# Patient Record
Sex: MALE | Race: WHITE | Employment: OTHER | URBAN - NONMETROPOLITAN AREA
[De-identification: names, ages, dates, MRNs, and addresses within clinical notes are randomized per-mention and may not be internally consistent; named-entity substitution may affect disease eponyms.]

---

## 2018-07-09 ENCOUNTER — HOSPITAL ENCOUNTER (EMERGENCY)
Age: 63
Discharge: HOME | End: 2018-07-09
Attending: EMERGENCY MEDICINE
Payer: MEDICAID

## 2018-07-09 VITALS
TEMPERATURE: 97.6 F | DIASTOLIC BLOOD PRESSURE: 84 MMHG | HEIGHT: 70 IN | BODY MASS INDEX: 23.91 KG/M2 | SYSTOLIC BLOOD PRESSURE: 111 MMHG | WEIGHT: 167 LBS | OXYGEN SATURATION: 97 % | RESPIRATION RATE: 16 BRPM | HEART RATE: 109 BPM

## 2018-07-09 DIAGNOSIS — M54.2 CHRONIC NECK PAIN: ICD-10-CM

## 2018-07-09 DIAGNOSIS — G89.29 CHRONIC NECK PAIN: ICD-10-CM

## 2018-07-09 DIAGNOSIS — F10.920 ACUTE ALCOHOLIC INTOXICATION WITHOUT COMPLICATION (HCC): Primary | ICD-10-CM

## 2018-07-09 LAB
ALBUMIN SERPL-MCNC: 4.2 G/DL (ref 3.5–5.2)
ALP BLD-CCNC: 77 U/L (ref 40–130)
ALT SERPL-CCNC: 12 U/L (ref 5–41)
AMPHETAMINE SCREEN, URINE: NEGATIVE
ANION GAP SERPL CALCULATED.3IONS-SCNC: 19 MMOL/L (ref 7–19)
AST SERPL-CCNC: 30 U/L (ref 5–40)
BANDED NEUTROPHILS RELATIVE PERCENT: 1 % (ref 0–5)
BARBITURATE SCREEN URINE: NEGATIVE
BASOPHILS ABSOLUTE: 0.3 K/UL (ref 0–0.2)
BASOPHILS MANUAL: 4 %
BASOPHILS RELATIVE PERCENT: 4 % (ref 0–1)
BENZODIAZEPINE SCREEN, URINE: POSITIVE
BILIRUB SERPL-MCNC: <0.2 MG/DL (ref 0.2–1.2)
BUN BLDV-MCNC: 18 MG/DL (ref 8–23)
CALCIUM SERPL-MCNC: 8.5 MG/DL (ref 8.8–10.2)
CANNABINOID SCREEN URINE: NEGATIVE
CHLORIDE BLD-SCNC: 106 MMOL/L (ref 98–111)
CO2: 18 MMOL/L (ref 22–29)
COCAINE METABOLITE SCREEN URINE: NEGATIVE
CREAT SERPL-MCNC: 1.3 MG/DL (ref 0.5–1.2)
EOSINOPHILS ABSOLUTE: 0.55 K/UL (ref 0–0.6)
EOSINOPHILS RELATIVE PERCENT: 7 % (ref 0–5)
ETHANOL: 315 MG/DL (ref 0–0.08)
GFR NON-AFRICAN AMERICAN: 56
GLUCOSE BLD-MCNC: 106 MG/DL (ref 74–109)
HCT VFR BLD CALC: 35.2 % (ref 42–52)
HEMOGLOBIN: 11.8 G/DL (ref 14–18)
LYMPHOCYTES ABSOLUTE: 2.3 K/UL (ref 1.1–4.5)
LYMPHOCYTES RELATIVE PERCENT: 30 % (ref 20–40)
Lab: ABNORMAL
MACROCYTES: ABNORMAL
MCH RBC QN AUTO: 37.3 PG (ref 27–31)
MCHC RBC AUTO-ENTMCNC: 33.5 G/DL (ref 33–37)
MCV RBC AUTO: 111.4 FL (ref 80–94)
MONOCYTES ABSOLUTE: 0.5 K/UL (ref 0–0.9)
MONOCYTES RELATIVE PERCENT: 7 % (ref 0–10)
NEUTROPHILS ABSOLUTE: 4.1 K/UL (ref 1.5–7.5)
NEUTROPHILS MANUAL: 51 %
NEUTROPHILS RELATIVE PERCENT: 51 % (ref 50–65)
OPIATE SCREEN URINE: POSITIVE
PDW BLD-RTO: 13.5 % (ref 11.5–14.5)
PLATELET # BLD: 222 K/UL (ref 130–400)
PLATELET SLIDE REVIEW: ADEQUATE
PMV BLD AUTO: 9.7 FL (ref 9.4–12.4)
POTASSIUM SERPL-SCNC: 4.2 MMOL/L (ref 3.5–5)
RBC # BLD: 3.16 M/UL (ref 4.7–6.1)
SODIUM BLD-SCNC: 143 MMOL/L (ref 136–145)
TOTAL PROTEIN: 6.8 G/DL (ref 6.6–8.7)
WBC # BLD: 7.8 K/UL (ref 4.8–10.8)

## 2018-07-09 PROCEDURE — 80307 DRUG TEST PRSMV CHEM ANLYZR: CPT

## 2018-07-09 PROCEDURE — 99284 EMERGENCY DEPT VISIT MOD MDM: CPT

## 2018-07-09 PROCEDURE — 99283 EMERGENCY DEPT VISIT LOW MDM: CPT | Performed by: EMERGENCY MEDICINE

## 2018-07-09 PROCEDURE — G0480 DRUG TEST DEF 1-7 CLASSES: HCPCS

## 2018-07-09 PROCEDURE — 6370000000 HC RX 637 (ALT 250 FOR IP): Performed by: EMERGENCY MEDICINE

## 2018-07-09 PROCEDURE — 36415 COLL VENOUS BLD VENIPUNCTURE: CPT

## 2018-07-09 PROCEDURE — 2580000003 HC RX 258: Performed by: EMERGENCY MEDICINE

## 2018-07-09 PROCEDURE — 85025 COMPLETE CBC W/AUTO DIFF WBC: CPT

## 2018-07-09 PROCEDURE — 80053 COMPREHEN METABOLIC PANEL: CPT

## 2018-07-09 RX ORDER — LORATADINE 10 MG/1
10 CAPSULE, LIQUID FILLED ORAL DAILY PRN
Status: ON HOLD | COMMUNITY
End: 2020-02-03

## 2018-07-09 RX ORDER — HYDROCODONE BITARTRATE AND ACETAMINOPHEN 5; 325 MG/1; MG/1
1 TABLET ORAL ONCE
Status: COMPLETED | OUTPATIENT
Start: 2018-07-09 | End: 2018-07-09

## 2018-07-09 RX ORDER — DOCUSATE SODIUM 100 MG/1
100 CAPSULE, LIQUID FILLED ORAL PRN
COMMUNITY
End: 2020-02-02 | Stop reason: ALTCHOICE

## 2018-07-09 RX ORDER — 0.9 % SODIUM CHLORIDE 0.9 %
1000 INTRAVENOUS SOLUTION INTRAVENOUS ONCE
Status: COMPLETED | OUTPATIENT
Start: 2018-07-09 | End: 2018-07-09

## 2018-07-09 RX ORDER — 0.9 % SODIUM CHLORIDE 0.9 %
1000 INTRAVENOUS SOLUTION INTRAVENOUS ONCE
Status: DISCONTINUED | OUTPATIENT
Start: 2018-07-09 | End: 2018-07-09 | Stop reason: HOSPADM

## 2018-07-09 RX ORDER — CHLORDIAZEPOXIDE HYDROCHLORIDE 10 MG/1
10 CAPSULE, GELATIN COATED ORAL 2 TIMES DAILY
COMMUNITY
End: 2020-02-02 | Stop reason: ALTCHOICE

## 2018-07-09 RX ORDER — SIMVASTATIN 40 MG
40 TABLET ORAL NIGHTLY
Status: ON HOLD | COMMUNITY
End: 2018-10-11 | Stop reason: HOSPADM

## 2018-07-09 RX ADMIN — HYDROCODONE BITARTRATE AND ACETAMINOPHEN 1 TABLET: 5; 325 TABLET ORAL at 11:48

## 2018-07-09 RX ADMIN — SODIUM CHLORIDE 1000 ML: 9 INJECTION, SOLUTION INTRAVENOUS at 10:58

## 2018-07-09 ASSESSMENT — PAIN DESCRIPTION - PAIN TYPE
TYPE: CHRONIC PAIN
TYPE: CHRONIC PAIN

## 2018-07-09 ASSESSMENT — PAIN SCALES - GENERAL
PAINLEVEL_OUTOF10: 10
PAINLEVEL_OUTOF10: 9

## 2018-07-09 ASSESSMENT — ENCOUNTER SYMPTOMS
DIARRHEA: 0
BACK PAIN: 0
SHORTNESS OF BREATH: 0
RHINORRHEA: 0
VOMITING: 0
SORE THROAT: 0
COUGH: 0
ABDOMINAL PAIN: 0
NAUSEA: 0

## 2018-07-09 NOTE — ED PROVIDER NOTES
disease    Spinal stenosis     cervical    Ulcer ([facility] [address]          SURGICAL HISTORY       Past Surgical History:   Procedure Laterality Date    HERNIA REPAIR           CURRENT MEDICATIONS       Discharge Medication List as of 7/9/2018  1:04 PM      CONTINUE these medications which have NOT CHANGED    Details   chlordiazePOXIDE (LIBRIUM) 10 MG capsule Take 10 mg by mouth 3 times daily as needed for Anxiety. Fanny Crawford Historical Med      docusate sodium (COLACE) 100 MG capsule Take 100 mg by mouth 2 times dailyHistorical Med      EPINEPHRINE HCL IN Inhale into the lungsHistorical Med      loratadine (CLARITIN) 10 MG capsule Take 10 mg by mouth dailyHistorical Med      simvastatin (ZOCOR) 40 MG tablet Take 40 mg by mouth nightlyHistorical Med      venlafaxine (EFFEXOR) 75 MG tablet Take 75 mg by mouth nightly      clopidogrel (PLAVIX) 75 MG tablet Take 1 tablet by mouth daily, Disp-30 tablet, R-1      aspirin 81 MG EC tablet Take 1 tablet by mouth daily, Disp-30 tablet, R-0      albuterol sulfate  (90 BASE) MCG/ACT inhaler Inhale 2 puffs into the lungs every 6 hours as needed for Wheezing      lisinopril (PRINIVIL;ZESTRIL) 10 MG tablet Take 10 mg by mouth daily      ferrous sulfate 325 (65 FE) MG tablet Take 325 mg by mouth 2 times daily      HYDROcodone-acetaminophen (NORCO)  MG per tablet Take 1 tablet by mouth 2 times daily as needed for Pain      ranitidine (ZANTAC) 150 MG tablet Take 150 mg by mouth 2 times daily      atorvastatin (LIPITOR) 40 MG tablet Take 1 tablet by mouth daily, Disp-30 tablet, R-0      tamsulosin (FLOMAX) 0.4 MG capsule Take 1 capsule by mouth daily, Disp-30 capsule, R-0      Omega-3 Fatty Acids (FISH OIL) 1000 MG CPDR Take 1 capsule by mouth 2 times daily, Disp-60 capsule, R-0      sucralfate (CARAFATE) 1 GM tablet Take 1 tablet by mouth 4 times daily, Disp-120 tablet, R-0      Dexlansoprazole 30 MG CPDR Take 30 mg by mouth daily, Disp-30 capsule, R-1      DULoxetine (CYMBALTA) 30 MG capsule Take 1 capsule by mouth nightly, Disp-30 capsule, R-1      gemfibrozil (LOPID) 600 MG tablet Take 600 mg by mouth 2 times daily (before meals)             ALLERGIES     Niacin and related and Flu virus vaccine    FAMILY HISTORY     History reviewed. No pertinent family history. SOCIAL HISTORY       Social History     Social History    Marital status:      Spouse name: N/A    Number of children: N/A    Years of education: N/A     Social History Main Topics    Smoking status: Former Smoker     Packs/day: 1.00     Years: 35.00     Types: Cigarettes     Quit date: 2/11/2016    Smokeless tobacco: Never Used    Alcohol use 0.0 oz/week      Comment: vodka every other day. up to 1/2 a pint.  Drug use: Yes     Types: Marijuana      Comment: occassionally.  Sexual activity: Not Asked     Other Topics Concern    None     Social History Narrative    None       SCREENINGS             PHYSICAL EXAM    (up to 7 for level 4, 8 or more for level 5)     ED Triage Vitals   BP Temp Temp src Pulse Resp SpO2 Height Weight   07/09/18 0844 07/09/18 0844 -- 07/09/18 0844 07/09/18 0844 07/09/18 0844 07/09/18 0838 07/09/18 0838   (!) 138/105 97.6 °F (36.4 °C)  126 20 99 % 5' 10\" (1.778 m) 167 lb (75.8 kg)       Physical Exam   Constitutional: He is oriented to person, place, and time. He appears well-developed and well-nourished. Smells of alcohol, unkempt appearance   HENT:   Head: Normocephalic and atraumatic. Eyes: Conjunctivae and EOM are normal.   Neck: Normal range of motion. Neck supple. No tracheal deviation present. Somewhat limited range of motion when turning head bilaterally but he has no focal tenderness on exam   Cardiovascular: Normal rate, regular rhythm and normal heart sounds. No murmur heard. Pulmonary/Chest: Breath sounds normal. He has no wheezes. He has no rales. Abdominal: Soft. There is no tenderness. Musculoskeletal: Normal range of motion. He exhibits no edema.

## 2018-07-09 NOTE — ED NOTES
Pt states he has chronic nek pain from a fracture and that he drinks to ease the pain.      Herb Verma RN  07/09/18 0292

## 2018-10-10 ENCOUNTER — APPOINTMENT (OUTPATIENT)
Dept: GENERAL RADIOLOGY | Age: 63
DRG: 469 | End: 2018-10-10
Payer: MEDICAID

## 2018-10-10 ENCOUNTER — APPOINTMENT (OUTPATIENT)
Dept: CT IMAGING | Age: 63
DRG: 469 | End: 2018-10-10
Payer: MEDICAID

## 2018-10-10 ENCOUNTER — HOSPITAL ENCOUNTER (INPATIENT)
Age: 63
LOS: 1 days | Discharge: HOME | DRG: 469 | End: 2018-10-11
Attending: EMERGENCY MEDICINE | Admitting: HOSPITALIST
Payer: MEDICAID

## 2018-10-10 DIAGNOSIS — G89.29 OTHER CHRONIC PAIN: ICD-10-CM

## 2018-10-10 DIAGNOSIS — R55 SYNCOPE AND COLLAPSE: Primary | ICD-10-CM

## 2018-10-10 DIAGNOSIS — E87.20 METABOLIC ACIDOSIS: ICD-10-CM

## 2018-10-10 DIAGNOSIS — F10.929 ACUTE ALCOHOLIC INTOXICATION WITH COMPLICATION (HCC): ICD-10-CM

## 2018-10-10 DIAGNOSIS — Z86.73 HX OF COMPLETED STROKE: ICD-10-CM

## 2018-10-10 DIAGNOSIS — N28.9 RENAL INSUFFICIENCY: ICD-10-CM

## 2018-10-10 PROBLEM — F19.10 POLYSUBSTANCE ABUSE (HCC): Status: ACTIVE | Noted: 2018-10-10

## 2018-10-10 PROBLEM — F10.930 ALCOHOL WITHDRAWAL SYNDROME WITHOUT COMPLICATION (HCC): Status: ACTIVE | Noted: 2018-10-10

## 2018-10-10 PROBLEM — F10.10 ALCOHOL ABUSE: Status: ACTIVE | Noted: 2018-10-10

## 2018-10-10 LAB
ACETAMINOPHEN LEVEL: <15 UG/ML
ALBUMIN SERPL-MCNC: 4 G/DL (ref 3.5–5.2)
ALP BLD-CCNC: 54 U/L (ref 40–130)
ALT SERPL-CCNC: 8 U/L (ref 5–41)
AMPHETAMINE SCREEN, URINE: NEGATIVE
ANION GAP SERPL CALCULATED.3IONS-SCNC: 21 MMOL/L (ref 7–19)
APTT: 30.1 SEC (ref 26–36.2)
AST SERPL-CCNC: 14 U/L (ref 5–40)
BARBITURATE SCREEN URINE: NEGATIVE
BASOPHILS ABSOLUTE: 0.1 K/UL (ref 0–0.2)
BASOPHILS RELATIVE PERCENT: 1.5 % (ref 0–1)
BENZODIAZEPINE SCREEN, URINE: POSITIVE
BILIRUB SERPL-MCNC: <0.2 MG/DL (ref 0.2–1.2)
BILIRUBIN URINE: NEGATIVE
BLOOD, URINE: NEGATIVE
BUN BLDV-MCNC: 29 MG/DL (ref 8–23)
CALCIUM SERPL-MCNC: 8.3 MG/DL (ref 8.8–10.2)
CANNABINOID SCREEN URINE: POSITIVE
CHLORIDE BLD-SCNC: 98 MMOL/L (ref 98–111)
CLARITY: CLEAR
CO2: 16 MMOL/L (ref 22–29)
COCAINE METABOLITE SCREEN URINE: NEGATIVE
COLOR: YELLOW
CREAT SERPL-MCNC: 1.7 MG/DL (ref 0.5–1.2)
EOSINOPHILS ABSOLUTE: 0.2 K/UL (ref 0–0.6)
EOSINOPHILS RELATIVE PERCENT: 3.7 % (ref 0–5)
ETHANOL: 374 MG/DL (ref 0–0.08)
GFR NON-AFRICAN AMERICAN: 41
GLUCOSE BLD-MCNC: 111 MG/DL (ref 74–109)
GLUCOSE URINE: NEGATIVE MG/DL
HCT VFR BLD CALC: 30 % (ref 42–52)
HEMOGLOBIN: 10 G/DL (ref 14–18)
INR BLD: 1.01 (ref 0.88–1.18)
KETONES, URINE: NEGATIVE MG/DL
LEUKOCYTE ESTERASE, URINE: NEGATIVE
LIPASE: 47 U/L (ref 13–60)
LV EF: 58 %
LVEF MODALITY: NORMAL
LYMPHOCYTES ABSOLUTE: 2.1 K/UL (ref 1.1–4.5)
LYMPHOCYTES RELATIVE PERCENT: 37.5 % (ref 20–40)
Lab: ABNORMAL
MAGNESIUM: 2 MG/DL (ref 1.6–2.4)
MCH RBC QN AUTO: 37.2 PG (ref 27–31)
MCHC RBC AUTO-ENTMCNC: 33.3 G/DL (ref 33–37)
MCV RBC AUTO: 111.5 FL (ref 80–94)
MONOCYTES ABSOLUTE: 0.3 K/UL (ref 0–0.9)
MONOCYTES RELATIVE PERCENT: 4.9 % (ref 0–10)
NEUTROPHILS ABSOLUTE: 2.9 K/UL (ref 1.5–7.5)
NEUTROPHILS RELATIVE PERCENT: 52.2 % (ref 50–65)
NITRITE, URINE: NEGATIVE
OPIATE SCREEN URINE: NEGATIVE
PDW BLD-RTO: 15.7 % (ref 11.5–14.5)
PH UA: 5.5
PLATELET # BLD: 183 K/UL (ref 130–400)
PMV BLD AUTO: 9.2 FL (ref 9.4–12.4)
POTASSIUM SERPL-SCNC: 4 MMOL/L (ref 3.5–5)
PROTEIN UA: NEGATIVE MG/DL
PROTHROMBIN TIME: 13.2 SEC (ref 12–14.6)
RBC # BLD: 2.69 M/UL (ref 4.7–6.1)
SALICYLATE, SERUM: <3 MG/DL (ref 3–10)
SODIUM BLD-SCNC: 135 MMOL/L (ref 136–145)
SPECIFIC GRAVITY UA: 1.01
TOTAL CK: 114 U/L (ref 39–308)
TOTAL PROTEIN: 6.6 G/DL (ref 6.6–8.7)
TROPONIN: <0.01 NG/ML (ref 0–0.03)
URINE REFLEX TO CULTURE: NORMAL
UROBILINOGEN, URINE: 0.2 E.U./DL
WBC # BLD: 5.5 K/UL (ref 4.8–10.8)

## 2018-10-10 PROCEDURE — 85610 PROTHROMBIN TIME: CPT

## 2018-10-10 PROCEDURE — 6370000000 HC RX 637 (ALT 250 FOR IP): Performed by: EMERGENCY MEDICINE

## 2018-10-10 PROCEDURE — 2580000003 HC RX 258: Performed by: EMERGENCY MEDICINE

## 2018-10-10 PROCEDURE — 99285 EMERGENCY DEPT VISIT HI MDM: CPT | Performed by: EMERGENCY MEDICINE

## 2018-10-10 PROCEDURE — 83690 ASSAY OF LIPASE: CPT

## 2018-10-10 PROCEDURE — 70450 CT HEAD/BRAIN W/O DYE: CPT

## 2018-10-10 PROCEDURE — 83735 ASSAY OF MAGNESIUM: CPT

## 2018-10-10 PROCEDURE — G0480 DRUG TEST DEF 1-7 CLASSES: HCPCS

## 2018-10-10 PROCEDURE — 6360000002 HC RX W HCPCS: Performed by: INTERNAL MEDICINE

## 2018-10-10 PROCEDURE — 1210000000 HC MED SURG R&B

## 2018-10-10 PROCEDURE — 71045 X-RAY EXAM CHEST 1 VIEW: CPT

## 2018-10-10 PROCEDURE — 93005 ELECTROCARDIOGRAM TRACING: CPT

## 2018-10-10 PROCEDURE — 36415 COLL VENOUS BLD VENIPUNCTURE: CPT

## 2018-10-10 PROCEDURE — 2580000003 HC RX 258: Performed by: INTERNAL MEDICINE

## 2018-10-10 PROCEDURE — 85730 THROMBOPLASTIN TIME PARTIAL: CPT

## 2018-10-10 PROCEDURE — 6370000000 HC RX 637 (ALT 250 FOR IP): Performed by: INTERNAL MEDICINE

## 2018-10-10 PROCEDURE — 99285 EMERGENCY DEPT VISIT HI MDM: CPT

## 2018-10-10 PROCEDURE — 80053 COMPREHEN METABOLIC PANEL: CPT

## 2018-10-10 PROCEDURE — 85025 COMPLETE CBC W/AUTO DIFF WBC: CPT

## 2018-10-10 PROCEDURE — 6370000000 HC RX 637 (ALT 250 FOR IP): Performed by: HOSPITALIST

## 2018-10-10 PROCEDURE — 93306 TTE W/DOPPLER COMPLETE: CPT

## 2018-10-10 PROCEDURE — 99225 PR SBSQ OBSERVATION CARE/DAY 25 MINUTES: CPT | Performed by: INTERNAL MEDICINE

## 2018-10-10 PROCEDURE — 80307 DRUG TEST PRSMV CHEM ANLYZR: CPT

## 2018-10-10 PROCEDURE — 96375 TX/PRO/DX INJ NEW DRUG ADDON: CPT

## 2018-10-10 PROCEDURE — 84484 ASSAY OF TROPONIN QUANT: CPT

## 2018-10-10 PROCEDURE — 82550 ASSAY OF CK (CPK): CPT

## 2018-10-10 PROCEDURE — 81003 URINALYSIS AUTO W/O SCOPE: CPT

## 2018-10-10 PROCEDURE — 6360000002 HC RX W HCPCS: Performed by: EMERGENCY MEDICINE

## 2018-10-10 PROCEDURE — 96374 THER/PROPH/DIAG INJ IV PUSH: CPT

## 2018-10-10 PROCEDURE — 72125 CT NECK SPINE W/O DYE: CPT

## 2018-10-10 RX ORDER — HYDROCODONE BITARTRATE AND ACETAMINOPHEN 10; 325 MG/1; MG/1
1 TABLET ORAL ONCE
Status: COMPLETED | OUTPATIENT
Start: 2018-10-10 | End: 2018-10-10

## 2018-10-10 RX ORDER — LORAZEPAM 2 MG/ML
3 INJECTION INTRAMUSCULAR
Status: DISCONTINUED | OUTPATIENT
Start: 2018-10-10 | End: 2018-10-11 | Stop reason: HOSPADM

## 2018-10-10 RX ORDER — LORAZEPAM 1 MG/1
2 TABLET ORAL
Status: DISCONTINUED | OUTPATIENT
Start: 2018-10-10 | End: 2018-10-11 | Stop reason: HOSPADM

## 2018-10-10 RX ORDER — LORAZEPAM 2 MG/ML
2 INJECTION INTRAMUSCULAR
Status: DISCONTINUED | OUTPATIENT
Start: 2018-10-10 | End: 2018-10-11 | Stop reason: HOSPADM

## 2018-10-10 RX ORDER — LORAZEPAM 2 MG/ML
4 INJECTION INTRAMUSCULAR
Status: DISCONTINUED | OUTPATIENT
Start: 2018-10-10 | End: 2018-10-11 | Stop reason: HOSPADM

## 2018-10-10 RX ORDER — LORAZEPAM 2 MG/ML
1 INJECTION INTRAMUSCULAR ONCE
Status: COMPLETED | OUTPATIENT
Start: 2018-10-10 | End: 2018-10-10

## 2018-10-10 RX ORDER — LORAZEPAM 2 MG/ML
1 INJECTION INTRAMUSCULAR
Status: DISCONTINUED | OUTPATIENT
Start: 2018-10-10 | End: 2018-10-11 | Stop reason: HOSPADM

## 2018-10-10 RX ORDER — LORAZEPAM 1 MG/1
4 TABLET ORAL
Status: DISCONTINUED | OUTPATIENT
Start: 2018-10-10 | End: 2018-10-11 | Stop reason: HOSPADM

## 2018-10-10 RX ORDER — THIAMINE MONONITRATE (VIT B1) 100 MG
100 TABLET ORAL DAILY
Status: DISCONTINUED | OUTPATIENT
Start: 2018-10-10 | End: 2018-10-11 | Stop reason: HOSPADM

## 2018-10-10 RX ORDER — CALCIUM CARBONATE 200(500)MG
500 TABLET,CHEWABLE ORAL 3 TIMES DAILY PRN
Status: DISCONTINUED | OUTPATIENT
Start: 2018-10-10 | End: 2018-10-11 | Stop reason: HOSPADM

## 2018-10-10 RX ORDER — LORAZEPAM 1 MG/1
3 TABLET ORAL
Status: DISCONTINUED | OUTPATIENT
Start: 2018-10-10 | End: 2018-10-11 | Stop reason: HOSPADM

## 2018-10-10 RX ORDER — HYDROCODONE BITARTRATE AND ACETAMINOPHEN 10; 325 MG/1; MG/1
1 TABLET ORAL EVERY 6 HOURS PRN
Status: DISCONTINUED | OUTPATIENT
Start: 2018-10-10 | End: 2018-10-11 | Stop reason: HOSPADM

## 2018-10-10 RX ORDER — SODIUM CHLORIDE 9 MG/ML
INJECTION, SOLUTION INTRAVENOUS CONTINUOUS
Status: DISCONTINUED | OUTPATIENT
Start: 2018-10-10 | End: 2018-10-11 | Stop reason: HOSPADM

## 2018-10-10 RX ORDER — THIAMINE HYDROCHLORIDE 100 MG/ML
100 INJECTION, SOLUTION INTRAMUSCULAR; INTRAVENOUS ONCE
Status: COMPLETED | OUTPATIENT
Start: 2018-10-10 | End: 2018-10-10

## 2018-10-10 RX ORDER — CHLORDIAZEPOXIDE HYDROCHLORIDE 25 MG/1
25 CAPSULE, GELATIN COATED ORAL ONCE
Status: COMPLETED | OUTPATIENT
Start: 2018-10-10 | End: 2018-10-10

## 2018-10-10 RX ORDER — LORAZEPAM 1 MG/1
1 TABLET ORAL
Status: DISCONTINUED | OUTPATIENT
Start: 2018-10-10 | End: 2018-10-11 | Stop reason: HOSPADM

## 2018-10-10 RX ORDER — ALBUTEROL SULFATE 90 UG/1
2 AEROSOL, METERED RESPIRATORY (INHALATION) EVERY 6 HOURS PRN
Status: DISCONTINUED | OUTPATIENT
Start: 2018-10-10 | End: 2018-10-11 | Stop reason: HOSPADM

## 2018-10-10 RX ORDER — ONDANSETRON 2 MG/ML
4 INJECTION INTRAMUSCULAR; INTRAVENOUS EVERY 6 HOURS PRN
Status: DISCONTINUED | OUTPATIENT
Start: 2018-10-10 | End: 2018-10-11 | Stop reason: HOSPADM

## 2018-10-10 RX ORDER — FOLIC ACID 1 MG/1
1 TABLET ORAL DAILY
Status: DISCONTINUED | OUTPATIENT
Start: 2018-10-10 | End: 2018-10-11 | Stop reason: HOSPADM

## 2018-10-10 RX ORDER — SODIUM CHLORIDE 0.9 % (FLUSH) 0.9 %
10 SYRINGE (ML) INJECTION PRN
Status: DISCONTINUED | OUTPATIENT
Start: 2018-10-10 | End: 2018-10-11 | Stop reason: HOSPADM

## 2018-10-10 RX ORDER — SODIUM CHLORIDE 0.9 % (FLUSH) 0.9 %
10 SYRINGE (ML) INJECTION EVERY 12 HOURS SCHEDULED
Status: DISCONTINUED | OUTPATIENT
Start: 2018-10-10 | End: 2018-10-11 | Stop reason: HOSPADM

## 2018-10-10 RX ORDER — 0.9 % SODIUM CHLORIDE 0.9 %
1000 INTRAVENOUS SOLUTION INTRAVENOUS ONCE
Status: COMPLETED | OUTPATIENT
Start: 2018-10-10 | End: 2018-10-10

## 2018-10-10 RX ADMIN — ENOXAPARIN SODIUM 40 MG: 100 INJECTION SUBCUTANEOUS at 09:00

## 2018-10-10 RX ADMIN — CHLORDIAZEPOXIDE HYDROCHLORIDE 25 MG: 25 CAPSULE ORAL at 02:59

## 2018-10-10 RX ADMIN — ALBUTEROL SULFATE 2 PUFF: 90 AEROSOL, METERED RESPIRATORY (INHALATION) at 14:33

## 2018-10-10 RX ADMIN — SODIUM CHLORIDE: 9 INJECTION, SOLUTION INTRAVENOUS at 08:59

## 2018-10-10 RX ADMIN — LORAZEPAM 1 MG: 2 INJECTION INTRAMUSCULAR; INTRAVENOUS at 19:21

## 2018-10-10 RX ADMIN — LORAZEPAM 3 MG: 1 TABLET ORAL at 08:59

## 2018-10-10 RX ADMIN — HYDROCODONE BITARTRATE AND ACETAMINOPHEN 1 TABLET: 10; 325 TABLET ORAL at 08:59

## 2018-10-10 RX ADMIN — Medication 100 MG: at 09:00

## 2018-10-10 RX ADMIN — HYDROCODONE BITARTRATE AND ACETAMINOPHEN 1 TABLET: 10; 325 TABLET ORAL at 19:21

## 2018-10-10 RX ADMIN — ALBUTEROL SULFATE 2 PUFF: 90 AEROSOL, METERED RESPIRATORY (INHALATION) at 20:18

## 2018-10-10 RX ADMIN — CALCIUM CARBONATE 500 MG: 500 TABLET, CHEWABLE ORAL at 21:47

## 2018-10-10 RX ADMIN — THIAMINE HYDROCHLORIDE 100 MG: 100 INJECTION, SOLUTION INTRAMUSCULAR; INTRAVENOUS at 01:35

## 2018-10-10 RX ADMIN — FOLIC ACID 1 MG: 1 TABLET ORAL at 09:00

## 2018-10-10 RX ADMIN — HYDROCODONE BITARTRATE AND ACETAMINOPHEN 1 TABLET: 10; 325 TABLET ORAL at 02:59

## 2018-10-10 RX ADMIN — LORAZEPAM 1 MG: 2 INJECTION INTRAMUSCULAR; INTRAVENOUS at 03:43

## 2018-10-10 RX ADMIN — Medication 10 ML: at 08:59

## 2018-10-10 RX ADMIN — SODIUM CHLORIDE 1000 ML: 9 INJECTION, SOLUTION INTRAVENOUS at 01:35

## 2018-10-10 ASSESSMENT — PAIN SCALES - GENERAL
PAINLEVEL_OUTOF10: 8
PAINLEVEL_OUTOF10: 3
PAINLEVEL_OUTOF10: 8
PAINLEVEL_OUTOF10: 10
PAINLEVEL_OUTOF10: 4

## 2018-10-10 ASSESSMENT — ENCOUNTER SYMPTOMS
BLURRED VISION: 0
DOUBLE VISION: 0
VOMITING: 0
SPUTUM PRODUCTION: 0
COUGH: 0
SHORTNESS OF BREATH: 0
ABDOMINAL PAIN: 0
NAUSEA: 0
HEARTBURN: 0

## 2018-10-10 NOTE — ED NOTES
Patient placed in a gown Patient placed on cardiac monitor, continuous pulse oximeter, and NIBP monitor.  Monitor alarms on.       Nader Ross RN  10/10/18 0009

## 2018-10-11 VITALS
HEART RATE: 80 BPM | SYSTOLIC BLOOD PRESSURE: 130 MMHG | OXYGEN SATURATION: 98 % | RESPIRATION RATE: 18 BRPM | DIASTOLIC BLOOD PRESSURE: 78 MMHG | TEMPERATURE: 97.9 F

## 2018-10-11 LAB
ANION GAP SERPL CALCULATED.3IONS-SCNC: 14 MMOL/L (ref 7–19)
BASOPHILS ABSOLUTE: 0.1 K/UL (ref 0–0.2)
BASOPHILS RELATIVE PERCENT: 1 % (ref 0–1)
BUN BLDV-MCNC: 21 MG/DL (ref 8–23)
CALCIUM SERPL-MCNC: 8.6 MG/DL (ref 8.8–10.2)
CHLORIDE BLD-SCNC: 107 MMOL/L (ref 98–111)
CO2: 20 MMOL/L (ref 22–29)
CREAT SERPL-MCNC: 1.1 MG/DL (ref 0.5–1.2)
EOSINOPHILS ABSOLUTE: 0.3 K/UL (ref 0–0.6)
EOSINOPHILS RELATIVE PERCENT: 4.4 % (ref 0–5)
GFR NON-AFRICAN AMERICAN: >60
GLUCOSE BLD-MCNC: 86 MG/DL (ref 74–109)
HCT VFR BLD CALC: 28.9 % (ref 42–52)
HEMOGLOBIN: 9.4 G/DL (ref 14–18)
LYMPHOCYTES ABSOLUTE: 1.4 K/UL (ref 1.1–4.5)
LYMPHOCYTES RELATIVE PERCENT: 24.6 % (ref 20–40)
MCH RBC QN AUTO: 36.6 PG (ref 27–31)
MCHC RBC AUTO-ENTMCNC: 32.5 G/DL (ref 33–37)
MCV RBC AUTO: 112.5 FL (ref 80–94)
MONOCYTES ABSOLUTE: 0.2 K/UL (ref 0–0.9)
MONOCYTES RELATIVE PERCENT: 4.1 % (ref 0–10)
NEUTROPHILS ABSOLUTE: 3.8 K/UL (ref 1.5–7.5)
NEUTROPHILS RELATIVE PERCENT: 65.6 % (ref 50–65)
PDW BLD-RTO: 15.5 % (ref 11.5–14.5)
PLATELET # BLD: 159 K/UL (ref 130–400)
PMV BLD AUTO: 9.7 FL (ref 9.4–12.4)
POTASSIUM REFLEX MAGNESIUM: 4.2 MMOL/L (ref 3.5–5)
RBC # BLD: 2.57 M/UL (ref 4.7–6.1)
SODIUM BLD-SCNC: 141 MMOL/L (ref 136–145)
WBC # BLD: 5.9 K/UL (ref 4.8–10.8)

## 2018-10-11 PROCEDURE — 99238 HOSP IP/OBS DSCHRG MGMT 30/<: CPT | Performed by: HOSPITALIST

## 2018-10-11 PROCEDURE — 80048 BASIC METABOLIC PNL TOTAL CA: CPT

## 2018-10-11 PROCEDURE — 36415 COLL VENOUS BLD VENIPUNCTURE: CPT

## 2018-10-11 PROCEDURE — 93880 EXTRACRANIAL BILAT STUDY: CPT

## 2018-10-11 PROCEDURE — 6370000000 HC RX 637 (ALT 250 FOR IP): Performed by: INTERNAL MEDICINE

## 2018-10-11 PROCEDURE — 85025 COMPLETE CBC W/AUTO DIFF WBC: CPT

## 2018-10-11 RX ADMIN — HYDROCODONE BITARTRATE AND ACETAMINOPHEN 1 TABLET: 10; 325 TABLET ORAL at 04:43

## 2018-10-11 ASSESSMENT — PAIN SCALES - GENERAL: PAINLEVEL_OUTOF10: 8

## 2018-10-11 NOTE — DISCHARGE INSTR - DIET

## 2018-10-11 NOTE — DISCHARGE SUMMARY
Hospitalist  Discharge Summary    Patient ID: Jacklyn Spain      Patient's PCP: Yuliya Walton MD    Admit Date: 10/10/2018     Discharge Date:   10/11/2018    Admitting Physician: Nasreen Delgadillo MD     Discharge Physician: Nasreen Delgadillo MD     Discharge Diagnoses:  Principal Problem:    Syncope and collapse  Active Problems:    Essential hypertension    GERD (gastroesophageal reflux disease)    Acute renal failure (HCC)    Polysubstance abuse ([address]    Alcohol abuse    Alcohol withdrawal syndrome without complication ([address]  Resolved Problems:    * No resolved hospital problems. *       Active Hospital Problems    Diagnosis Date Noted    Syncope and collapse [R55] 10/10/2018    Polysubstance abuse ([address] [F19.10] 10/10/2018    Alcohol abuse [F10.10] 10/10/2018    Alcohol withdrawal syndrome without complication ([address] [B24.645] 10/10/2018    Acute renal failure ([address] [N17.9]     Essential hypertension [I10] 2016    GERD (gastroesophageal reflux disease) [K21.9] 2016       The patient was seen and examined on day of discharge and this discharge summary is in conjunction with any daily progress note from day of discharge. Hospital Course: The patient is a 66 y.o. male who presented after a fall at home. Pt reports he got up from seated position going to the bathroom, was walking to his living room when suddenly fell and lost consciousness. Pt does not know how long he was down for, but when he came to he called EMS. Of note, pt reports a hx of neurologic symptoms. He describes a long hx of awaking from sleep feeling a \"surge,\" doesn't know whats going on and has memory loss. Pt has a hx of L cerebellar CVA about 3 years ago. Pt also with polysubstance abuse. Pt reports his brother  recently which caused him to drink more than usual. Although, pt reports he has drank more than he did last night without having issues with blacking out.  Pt c/o pain in his R arm from the fall and chronic TIA. TECHNIQUE: Multiple axial images were obtained through the brain without contrast infusion. Multiplanar images were reconstructed. DLP: 4969 mGy-cm. Automated exposure control was utilized. COMPARISON: 5/30/2016. FINDINGS: There is a chronic left cerebellar PICA distribution infarct with volume loss. There are no hemorrhage, edema or mass effect. There is mild atrophy. There is low-density in the white matter. The visualized paranasal sinuses and mastoid air cells are clear. No calvarial fracture. 1. No acute hemorrhage, edema or mass effect. 2. Chronic left PICA distribution cerebellar infarct with volume loss. 3. Age-related changes. Signed by Dr Adela Urbano on 10/10/2018 7:23 AM    Ct Cervical Spine Wo Contrast    Result Date: 10/10/2018  EXAMINATION:  CT CERVICAL SPINE WO CONTRAST  10/10/2018 7:34 AM HISTORY: The patient fell. Neck pain. TECHNIQUE: Spiral CT was performed of the cervical spine. Sagittal and coronal images were reconstructed. COMPARISON: No comparison study. DLP: 294 mGy-cm. Automated exposure control was utilized. FINDINGS: There is no evidence of cervical spine fracture or subluxation. There is atheromatous disease of the carotid bifurcations bilaterally. There are small scattered lymph nodes in the neck that are not pathologically enlarged. At C2-3, there is mild disc narrowing and uncinate spurring. There is mild facet arthropathy on the left. There is no significant spinal or foraminal narrowing. At C3-4, the disc spaces well-maintained. There is moderate bilateral uncinate spurring. There is severe left and mild right-sided foraminal narrowing. There is minimal narrowing of the central spinal canal due to spurring. At C4-5, the disc is well-maintained in height. There is moderate bilateral uncinate spurring. There is moderate bilateral foraminal stenosis. There is mild central spinal canal narrowing.  At C5-6, there is disc narrowing with diffuse spondylitic and uncinate spurring producing dural sac compression. There is mild to moderate central spinal canal narrowing. There is severe bilateral foraminal stenosis. At C6-7, there is uncinate spurring. There is mild disc narrowing. There is no significant central spinal canal narrowing. There is moderate to severe bilateral foraminal stenosis. 1. No evidence of cervical spine fracture or subluxation. 2. Degenerative changes, as described. 3. Atheromatous disease of the carotid arteries in the neck bilaterally. Signed by Dr Mota Setting on 10/10/2018 7:39 AM    Xr Chest Portable    Result Date: 10/10/2018  EXAMINATION:  XR CHEST PORTABLE  10/10/2018 7:32 AM HISTORY: The patient fell. COMPARISON: 4/19/2016. FINDINGS:  The lungs are expanded and clear. There is mild stable bronchial wall thickening. There is a probable fat pad in the right cardiophrenic angle, stable. The heart size is upper limits of normal. There is no vascular congestion or edema. There are degenerative changes of the AC joints bilaterally. There is no other significant bony abnormality seen. Portable chest x-ray demonstrates no active disease. Signed by Dr Mota Setting on 10/10/2018 7:33 AM    ECHOCARDIOGRAM  Summary   Normal left ventricular size with preserved LV function and an estimated   ejection fraction of approximately 55-60%.   No evidence of left ventricular mass or thrombus noted. CAROTIDS  There is heterogeneous plaque visualized in the bilateral internal carotid    artery(ies).    There is less than 50% stenosis in the right internal carotid artery.   Citlali Friendly is less than 50% stenosis of the left internal carotid artery.    There is normal antegrade flow in the bilateral vertebral artery(ies). Disposition:  Home     Discharge Instructions/Follow-up:    PCP this week    Code Status:  Full Code     Activity: activity as tolerated    Diet: cardiac diet    Labs:  For convenience and continuity at follow-up the following most recent labs are

## 2018-10-11 NOTE — PROGRESS NOTES
Vascular lab preliminary. Bilateral carotid duplex scan completed. B/L ICA's Abnormal <50% stenosis. B/l Vertebrals antegrade flow. Final report pending.

## 2018-10-12 LAB
EKG P AXIS: 69 DEGREES
EKG P-R INTERVAL: 174 MS
EKG Q-T INTERVAL: 328 MS
EKG QRS DURATION: 88 MS
EKG QTC CALCULATION (BAZETT): 410 MS
EKG T AXIS: 36 DEGREES

## 2019-03-14 ENCOUNTER — APPOINTMENT (OUTPATIENT)
Dept: GENERAL RADIOLOGY | Age: 64
End: 2019-03-14
Payer: MEDICAID

## 2019-03-14 ENCOUNTER — HOSPITAL ENCOUNTER (OUTPATIENT)
Age: 64
Setting detail: OBSERVATION
Discharge: HOME | End: 2019-03-16
Attending: INTERNAL MEDICINE | Admitting: INTERNAL MEDICINE
Payer: MEDICAID

## 2019-03-14 ENCOUNTER — APPOINTMENT (OUTPATIENT)
Dept: CT IMAGING | Age: 64
End: 2019-03-14
Payer: MEDICAID

## 2019-03-14 DIAGNOSIS — R07.89 ATYPICAL CHEST PAIN: Primary | ICD-10-CM

## 2019-03-14 DIAGNOSIS — J44.9 CHRONIC OBSTRUCTIVE PULMONARY DISEASE, UNSPECIFIED COPD TYPE (HCC): ICD-10-CM

## 2019-03-14 DIAGNOSIS — N17.9 AKI (ACUTE KIDNEY INJURY) (HCC): ICD-10-CM

## 2019-03-14 DIAGNOSIS — R10.13 ABDOMINAL PAIN, EPIGASTRIC: ICD-10-CM

## 2019-03-14 LAB
ALBUMIN SERPL-MCNC: 4.4 G/DL (ref 3.5–5.2)
ALP BLD-CCNC: 59 U/L (ref 40–130)
ALT SERPL-CCNC: 11 U/L (ref 5–41)
AMYLASE: 84 U/L (ref 28–100)
ANION GAP SERPL CALCULATED.3IONS-SCNC: 16 MMOL/L (ref 7–19)
AST SERPL-CCNC: 22 U/L (ref 5–40)
BASOPHILS ABSOLUTE: 0.1 K/UL (ref 0–0.2)
BASOPHILS RELATIVE PERCENT: 1.1 % (ref 0–1)
BILIRUB SERPL-MCNC: 0.4 MG/DL (ref 0.2–1.2)
BILIRUBIN URINE: NEGATIVE
BLOOD, URINE: NEGATIVE
BUN BLDV-MCNC: 31 MG/DL (ref 8–23)
CALCIUM SERPL-MCNC: 8.9 MG/DL (ref 8.8–10.2)
CHLORIDE BLD-SCNC: 106 MMOL/L (ref 98–111)
CLARITY: CLEAR
CO2: 17 MMOL/L (ref 22–29)
COLOR: YELLOW
CREAT SERPL-MCNC: 1.8 MG/DL (ref 0.5–1.2)
EOSINOPHILS ABSOLUTE: 0.3 K/UL (ref 0–0.6)
EOSINOPHILS RELATIVE PERCENT: 3 % (ref 0–5)
ETHANOL: <10 MG/DL (ref 0–0.08)
GFR NON-AFRICAN AMERICAN: 38
GLUCOSE BLD-MCNC: 133 MG/DL (ref 74–109)
GLUCOSE URINE: NEGATIVE MG/DL
HCT VFR BLD CALC: 34.4 % (ref 42–52)
HEMOGLOBIN: 11.3 G/DL (ref 14–18)
KETONES, URINE: NEGATIVE MG/DL
LEUKOCYTE ESTERASE, URINE: NEGATIVE
LIPASE: 41 U/L (ref 13–60)
LYMPHOCYTES ABSOLUTE: 1.5 K/UL (ref 1.1–4.5)
LYMPHOCYTES RELATIVE PERCENT: 17.9 % (ref 20–40)
MCH RBC QN AUTO: 37.7 PG (ref 27–31)
MCHC RBC AUTO-ENTMCNC: 32.8 G/DL (ref 33–37)
MCV RBC AUTO: 114.7 FL (ref 80–94)
MONOCYTES ABSOLUTE: 0.5 K/UL (ref 0–0.9)
MONOCYTES RELATIVE PERCENT: 5.3 % (ref 0–10)
NEUTROPHILS ABSOLUTE: 6.1 K/UL (ref 1.5–7.5)
NEUTROPHILS RELATIVE PERCENT: 72.5 % (ref 50–65)
NITRITE, URINE: NEGATIVE
PDW BLD-RTO: 14.1 % (ref 11.5–14.5)
PH UA: 5 (ref 5–8)
PLATELET # BLD: 152 K/UL (ref 130–400)
PMV BLD AUTO: 9.9 FL (ref 9.4–12.4)
POTASSIUM SERPL-SCNC: 4 MMOL/L (ref 3.5–5)
PRO-BNP: 153 PG/ML (ref 0–900)
PROTEIN UA: NEGATIVE MG/DL
RBC # BLD: 3 M/UL (ref 4.7–6.1)
SODIUM BLD-SCNC: 139 MMOL/L (ref 136–145)
SPECIFIC GRAVITY UA: 1.02 (ref 1–1.03)
TOTAL PROTEIN: 7.2 G/DL (ref 6.6–8.7)
TROPONIN: <0.01 NG/ML (ref 0–0.03)
URINE REFLEX TO CULTURE: NORMAL
UROBILINOGEN, URINE: 0.2 E.U./DL
WBC # BLD: 8.5 K/UL (ref 4.8–10.8)

## 2019-03-14 PROCEDURE — 84484 ASSAY OF TROPONIN QUANT: CPT

## 2019-03-14 PROCEDURE — G0378 HOSPITAL OBSERVATION PER HR: HCPCS

## 2019-03-14 PROCEDURE — 93005 ELECTROCARDIOGRAM TRACING: CPT

## 2019-03-14 PROCEDURE — 99285 EMERGENCY DEPT VISIT HI MDM: CPT | Performed by: PHYSICIAN ASSISTANT

## 2019-03-14 PROCEDURE — 36415 COLL VENOUS BLD VENIPUNCTURE: CPT

## 2019-03-14 PROCEDURE — 99220 PR INITIAL OBSERVATION CARE/DAY 70 MINUTES: CPT | Performed by: INTERNAL MEDICINE

## 2019-03-14 PROCEDURE — 99285 EMERGENCY DEPT VISIT HI MDM: CPT

## 2019-03-14 PROCEDURE — 81003 URINALYSIS AUTO W/O SCOPE: CPT

## 2019-03-14 PROCEDURE — 70450 CT HEAD/BRAIN W/O DYE: CPT

## 2019-03-14 PROCEDURE — G0480 DRUG TEST DEF 1-7 CLASSES: HCPCS

## 2019-03-14 PROCEDURE — 83880 ASSAY OF NATRIURETIC PEPTIDE: CPT

## 2019-03-14 PROCEDURE — 71046 X-RAY EXAM CHEST 2 VIEWS: CPT

## 2019-03-14 PROCEDURE — 85025 COMPLETE CBC W/AUTO DIFF WBC: CPT

## 2019-03-14 PROCEDURE — 6370000000 HC RX 637 (ALT 250 FOR IP): Performed by: PHYSICIAN ASSISTANT

## 2019-03-14 PROCEDURE — 82150 ASSAY OF AMYLASE: CPT

## 2019-03-14 PROCEDURE — 80053 COMPREHEN METABOLIC PANEL: CPT

## 2019-03-14 PROCEDURE — 83690 ASSAY OF LIPASE: CPT

## 2019-03-14 RX ORDER — AMLODIPINE BESYLATE 5 MG/1
5 TABLET ORAL DAILY
COMMUNITY
End: 2020-02-02 | Stop reason: ALTCHOICE

## 2019-03-14 RX ORDER — ONDANSETRON 2 MG/ML
4 INJECTION INTRAMUSCULAR; INTRAVENOUS EVERY 6 HOURS PRN
Status: DISCONTINUED | OUTPATIENT
Start: 2019-03-14 | End: 2019-03-16 | Stop reason: HOSPADM

## 2019-03-14 RX ORDER — PANTOPRAZOLE SODIUM 40 MG/10ML
40 INJECTION, POWDER, LYOPHILIZED, FOR SOLUTION INTRAVENOUS DAILY
Status: DISCONTINUED | OUTPATIENT
Start: 2019-03-14 | End: 2019-03-16 | Stop reason: HOSPADM

## 2019-03-14 RX ORDER — SODIUM CHLORIDE, SODIUM LACTATE, POTASSIUM CHLORIDE, CALCIUM CHLORIDE 600; 310; 30; 20 MG/100ML; MG/100ML; MG/100ML; MG/100ML
INJECTION, SOLUTION INTRAVENOUS CONTINUOUS
Status: DISCONTINUED | OUTPATIENT
Start: 2019-03-14 | End: 2019-03-16 | Stop reason: HOSPADM

## 2019-03-14 RX ORDER — SIMVASTATIN 40 MG
40 TABLET ORAL NIGHTLY
COMMUNITY
End: 2020-02-02 | Stop reason: ALTCHOICE

## 2019-03-14 RX ORDER — ACETAMINOPHEN 325 MG/1
325 TABLET ORAL EVERY 8 HOURS PRN
Status: DISCONTINUED | OUTPATIENT
Start: 2019-03-14 | End: 2019-03-15

## 2019-03-14 RX ORDER — LANOLIN ALCOHOL/MO/W.PET/CERES
1000 CREAM (GRAM) TOPICAL DAILY
COMMUNITY
End: 2020-02-02 | Stop reason: ALTCHOICE

## 2019-03-14 RX ADMIN — LIDOCAINE HYDROCHLORIDE: 20 SOLUTION ORAL; TOPICAL at 19:40

## 2019-03-14 ASSESSMENT — PAIN DESCRIPTION - ORIENTATION: ORIENTATION: LOWER

## 2019-03-14 ASSESSMENT — ENCOUNTER SYMPTOMS
COUGH: 0
BACK PAIN: 0
ABDOMINAL PAIN: 1
SHORTNESS OF BREATH: 0
COLOR CHANGE: 0
CHEST TIGHTNESS: 0
CHOKING: 0
STRIDOR: 0
WHEEZING: 1

## 2019-03-14 ASSESSMENT — PAIN SCALES - GENERAL: PAINLEVEL_OUTOF10: 5

## 2019-03-14 ASSESSMENT — PAIN DESCRIPTION - PAIN TYPE: TYPE: CHRONIC PAIN

## 2019-03-14 ASSESSMENT — PAIN DESCRIPTION - LOCATION: LOCATION: BACK;NECK

## 2019-03-15 PROBLEM — F10.10 ALCOHOL ABUSE: Status: ACTIVE | Noted: 2019-03-15

## 2019-03-15 PROBLEM — R42 DIZZINESS: Status: ACTIVE | Noted: 2019-03-15

## 2019-03-15 PROBLEM — R07.89 OTHER CHEST PAIN: Status: ACTIVE | Noted: 2019-03-15

## 2019-03-15 PROBLEM — R10.13 EPIGASTRIC PAIN: Status: ACTIVE | Noted: 2019-03-15

## 2019-03-15 LAB
ALBUMIN SERPL-MCNC: 3.9 G/DL (ref 3.5–5.2)
ALP BLD-CCNC: 59 U/L (ref 40–130)
ALT SERPL-CCNC: 9 U/L (ref 5–41)
ANION GAP SERPL CALCULATED.3IONS-SCNC: 13 MMOL/L (ref 7–19)
AST SERPL-CCNC: 19 U/L (ref 5–40)
BASOPHILS ABSOLUTE: 0.1 K/UL (ref 0–0.2)
BASOPHILS RELATIVE PERCENT: 0.9 % (ref 0–1)
BILIRUB SERPL-MCNC: 0.5 MG/DL (ref 0.2–1.2)
BUN BLDV-MCNC: 27 MG/DL (ref 8–23)
CALCIUM SERPL-MCNC: 8.9 MG/DL (ref 8.8–10.2)
CHLORIDE BLD-SCNC: 108 MMOL/L (ref 98–111)
CO2: 19 MMOL/L (ref 22–29)
CREAT SERPL-MCNC: 1.5 MG/DL (ref 0.5–1.2)
EOSINOPHILS ABSOLUTE: 0.2 K/UL (ref 0–0.6)
EOSINOPHILS RELATIVE PERCENT: 3.6 % (ref 0–5)
GFR NON-AFRICAN AMERICAN: 47
GLUCOSE BLD-MCNC: 108 MG/DL (ref 74–109)
HCT VFR BLD CALC: 31.1 % (ref 42–52)
HEMOGLOBIN: 10.1 G/DL (ref 14–18)
LV EF: 58 %
LVEF MODALITY: NORMAL
LYMPHOCYTES ABSOLUTE: 1.6 K/UL (ref 1.1–4.5)
LYMPHOCYTES RELATIVE PERCENT: 23.3 % (ref 20–40)
MAGNESIUM: 1.7 MG/DL (ref 1.6–2.4)
MCH RBC QN AUTO: 36.9 PG (ref 27–31)
MCHC RBC AUTO-ENTMCNC: 32.5 G/DL (ref 33–37)
MCV RBC AUTO: 113.5 FL (ref 80–94)
MONOCYTES ABSOLUTE: 0.4 K/UL (ref 0–0.9)
MONOCYTES RELATIVE PERCENT: 6 % (ref 0–10)
NEUTROPHILS ABSOLUTE: 4.4 K/UL (ref 1.5–7.5)
NEUTROPHILS RELATIVE PERCENT: 65.7 % (ref 50–65)
PDW BLD-RTO: 14.1 % (ref 11.5–14.5)
PHOSPHORUS: 3.4 MG/DL (ref 2.5–4.5)
PLATELET # BLD: 133 K/UL (ref 130–400)
PMV BLD AUTO: 9.8 FL (ref 9.4–12.4)
POTASSIUM REFLEX MAGNESIUM: 4.3 MMOL/L (ref 3.5–5)
RBC # BLD: 2.74 M/UL (ref 4.7–6.1)
SODIUM BLD-SCNC: 140 MMOL/L (ref 136–145)
TOTAL PROTEIN: 6.5 G/DL (ref 6.6–8.7)
TROPONIN: <0.01 NG/ML (ref 0–0.03)
TROPONIN: <0.01 NG/ML (ref 0–0.03)
WBC # BLD: 6.7 K/UL (ref 4.8–10.8)

## 2019-03-15 PROCEDURE — 99225 PR SBSQ OBSERVATION CARE/DAY 25 MINUTES: CPT | Performed by: INTERNAL MEDICINE

## 2019-03-15 PROCEDURE — 2580000003 HC RX 258: Performed by: INTERNAL MEDICINE

## 2019-03-15 PROCEDURE — 83735 ASSAY OF MAGNESIUM: CPT

## 2019-03-15 PROCEDURE — 96374 THER/PROPH/DIAG INJ IV PUSH: CPT

## 2019-03-15 PROCEDURE — 6360000002 HC RX W HCPCS: Performed by: INTERNAL MEDICINE

## 2019-03-15 PROCEDURE — 84100 ASSAY OF PHOSPHORUS: CPT

## 2019-03-15 PROCEDURE — G0378 HOSPITAL OBSERVATION PER HR: HCPCS

## 2019-03-15 PROCEDURE — 84484 ASSAY OF TROPONIN QUANT: CPT

## 2019-03-15 PROCEDURE — 80053 COMPREHEN METABOLIC PANEL: CPT

## 2019-03-15 PROCEDURE — 85025 COMPLETE CBC W/AUTO DIFF WBC: CPT

## 2019-03-15 PROCEDURE — 6370000000 HC RX 637 (ALT 250 FOR IP): Performed by: INTERNAL MEDICINE

## 2019-03-15 PROCEDURE — 96376 TX/PRO/DX INJ SAME DRUG ADON: CPT

## 2019-03-15 PROCEDURE — 96372 THER/PROPH/DIAG INJ SC/IM: CPT

## 2019-03-15 PROCEDURE — C9113 INJ PANTOPRAZOLE SODIUM, VIA: HCPCS | Performed by: INTERNAL MEDICINE

## 2019-03-15 PROCEDURE — 36415 COLL VENOUS BLD VENIPUNCTURE: CPT

## 2019-03-15 RX ORDER — LORAZEPAM 2 MG/ML
3 INJECTION INTRAMUSCULAR
Status: DISCONTINUED | OUTPATIENT
Start: 2019-03-15 | End: 2019-03-16 | Stop reason: HOSPADM

## 2019-03-15 RX ORDER — FOLIC ACID 1 MG/1
1 TABLET ORAL DAILY
Status: DISCONTINUED | OUTPATIENT
Start: 2019-03-15 | End: 2019-03-16 | Stop reason: HOSPADM

## 2019-03-15 RX ORDER — LORAZEPAM 1 MG/1
1 TABLET ORAL
Status: DISCONTINUED | OUTPATIENT
Start: 2019-03-15 | End: 2019-03-16 | Stop reason: HOSPADM

## 2019-03-15 RX ORDER — HYDROCODONE BITARTRATE AND ACETAMINOPHEN 5; 325 MG/1; MG/1
1 TABLET ORAL EVERY 8 HOURS PRN
Status: DISCONTINUED | OUTPATIENT
Start: 2019-03-15 | End: 2019-03-16 | Stop reason: HOSPADM

## 2019-03-15 RX ORDER — LORAZEPAM 2 MG/ML
2 INJECTION INTRAMUSCULAR
Status: DISCONTINUED | OUTPATIENT
Start: 2019-03-15 | End: 2019-03-16 | Stop reason: HOSPADM

## 2019-03-15 RX ORDER — LORAZEPAM 1 MG/1
2 TABLET ORAL
Status: DISCONTINUED | OUTPATIENT
Start: 2019-03-15 | End: 2019-03-16 | Stop reason: HOSPADM

## 2019-03-15 RX ORDER — LORAZEPAM 2 MG/ML
1 INJECTION INTRAMUSCULAR
Status: DISCONTINUED | OUTPATIENT
Start: 2019-03-15 | End: 2019-03-16 | Stop reason: HOSPADM

## 2019-03-15 RX ORDER — SODIUM CHLORIDE 0.9 % (FLUSH) 0.9 %
10 SYRINGE (ML) INJECTION PRN
Status: DISCONTINUED | OUTPATIENT
Start: 2019-03-15 | End: 2019-03-16 | Stop reason: HOSPADM

## 2019-03-15 RX ORDER — SUCRALFATE 1 G/1
1 TABLET ORAL 4 TIMES DAILY
Status: DISCONTINUED | OUTPATIENT
Start: 2019-03-15 | End: 2019-03-16 | Stop reason: HOSPADM

## 2019-03-15 RX ORDER — HYDROCODONE BITARTRATE AND ACETAMINOPHEN 10; 325 MG/1; MG/1
1 TABLET ORAL 3 TIMES DAILY
Status: DISCONTINUED | OUTPATIENT
Start: 2019-03-15 | End: 2019-03-15

## 2019-03-15 RX ORDER — LORAZEPAM 2 MG/ML
4 INJECTION INTRAMUSCULAR
Status: DISCONTINUED | OUTPATIENT
Start: 2019-03-15 | End: 2019-03-16 | Stop reason: HOSPADM

## 2019-03-15 RX ORDER — LORAZEPAM 1 MG/1
3 TABLET ORAL
Status: DISCONTINUED | OUTPATIENT
Start: 2019-03-15 | End: 2019-03-16 | Stop reason: HOSPADM

## 2019-03-15 RX ORDER — THIAMINE MONONITRATE (VIT B1) 100 MG
100 TABLET ORAL DAILY
Status: DISCONTINUED | OUTPATIENT
Start: 2019-03-15 | End: 2019-03-16 | Stop reason: HOSPADM

## 2019-03-15 RX ORDER — HYDROCODONE BITARTRATE AND ACETAMINOPHEN 10; 325 MG/1; MG/1
1 TABLET ORAL EVERY 8 HOURS PRN
Status: DISCONTINUED | OUTPATIENT
Start: 2019-03-15 | End: 2019-03-15

## 2019-03-15 RX ORDER — LORAZEPAM 1 MG/1
4 TABLET ORAL
Status: DISCONTINUED | OUTPATIENT
Start: 2019-03-15 | End: 2019-03-16 | Stop reason: HOSPADM

## 2019-03-15 RX ORDER — SODIUM CHLORIDE 0.9 % (FLUSH) 0.9 %
10 SYRINGE (ML) INJECTION EVERY 12 HOURS SCHEDULED
Status: DISCONTINUED | OUTPATIENT
Start: 2019-03-15 | End: 2019-03-16 | Stop reason: HOSPADM

## 2019-03-15 RX ADMIN — SODIUM CHLORIDE, POTASSIUM CHLORIDE, SODIUM LACTATE AND CALCIUM CHLORIDE: 600; 310; 30; 20 INJECTION, SOLUTION INTRAVENOUS at 13:47

## 2019-03-15 RX ADMIN — HYDROCODONE BITARTRATE AND ACETAMINOPHEN 1 TABLET: 10; 325 TABLET ORAL at 03:17

## 2019-03-15 RX ADMIN — HYDROCODONE BITARTRATE AND ACETAMINOPHEN 1 TABLET: 5; 325 TABLET ORAL at 22:05

## 2019-03-15 RX ADMIN — SUCRALFATE 1 G: 1 TABLET ORAL at 20:13

## 2019-03-15 RX ADMIN — SODIUM CHLORIDE, POTASSIUM CHLORIDE, SODIUM LACTATE AND CALCIUM CHLORIDE: 600; 310; 30; 20 INJECTION, SOLUTION INTRAVENOUS at 00:43

## 2019-03-15 RX ADMIN — FOLIC ACID 1 MG: 1 TABLET ORAL at 08:35

## 2019-03-15 RX ADMIN — HYDROCODONE BITARTRATE AND ACETAMINOPHEN 1 TABLET: 5; 325 TABLET ORAL at 13:47

## 2019-03-15 RX ADMIN — ACETAMINOPHEN 325 MG: 325 TABLET ORAL at 01:08

## 2019-03-15 RX ADMIN — SUCRALFATE 1 G: 1 TABLET ORAL at 18:09

## 2019-03-15 RX ADMIN — SUCRALFATE 1 G: 1 TABLET ORAL at 08:35

## 2019-03-15 RX ADMIN — Medication 100 MG: at 08:35

## 2019-03-15 RX ADMIN — PANTOPRAZOLE SODIUM 40 MG: 40 INJECTION, POWDER, FOR SOLUTION INTRAVENOUS at 01:08

## 2019-03-15 RX ADMIN — ENOXAPARIN SODIUM 40 MG: 40 INJECTION SUBCUTANEOUS at 08:35

## 2019-03-15 RX ADMIN — SUCRALFATE 1 G: 1 TABLET ORAL at 13:47

## 2019-03-15 RX ADMIN — PANTOPRAZOLE SODIUM 40 MG: 40 INJECTION, POWDER, FOR SOLUTION INTRAVENOUS at 08:35

## 2019-03-15 ASSESSMENT — ENCOUNTER SYMPTOMS
ABDOMINAL PAIN: 1
BLURRED VISION: 0
HEARTBURN: 1
EYE DISCHARGE: 0
PHOTOPHOBIA: 0
BACK PAIN: 1
SPUTUM PRODUCTION: 0
DOUBLE VISION: 0
WHEEZING: 0
NAUSEA: 1
EYE PAIN: 0
COUGH: 0
BLOOD IN STOOL: 0
ORTHOPNEA: 0
SHORTNESS OF BREATH: 0
HEMOPTYSIS: 0
CONSTIPATION: 0
DIARRHEA: 0
VOMITING: 0

## 2019-03-15 ASSESSMENT — PAIN DESCRIPTION - LOCATION: LOCATION: BACK;NECK

## 2019-03-15 ASSESSMENT — PAIN DESCRIPTION - PAIN TYPE: TYPE: CHRONIC PAIN

## 2019-03-15 ASSESSMENT — PAIN SCALES - GENERAL
PAINLEVEL_OUTOF10: 9
PAINLEVEL_OUTOF10: 8
PAINLEVEL_OUTOF10: 7
PAINLEVEL_OUTOF10: 9
PAINLEVEL_OUTOF10: 6
PAINLEVEL_OUTOF10: 9

## 2019-03-16 VITALS
BODY MASS INDEX: 23.88 KG/M2 | OXYGEN SATURATION: 97 % | RESPIRATION RATE: 18 BRPM | TEMPERATURE: 98.2 F | HEIGHT: 69 IN | WEIGHT: 161.25 LBS | HEART RATE: 77 BPM | SYSTOLIC BLOOD PRESSURE: 107 MMHG | DIASTOLIC BLOOD PRESSURE: 70 MMHG

## 2019-03-16 LAB
ANION GAP SERPL CALCULATED.3IONS-SCNC: 12 MMOL/L (ref 7–19)
BUN BLDV-MCNC: 20 MG/DL (ref 8–23)
CALCIUM SERPL-MCNC: 8.7 MG/DL (ref 8.8–10.2)
CHLORIDE BLD-SCNC: 108 MMOL/L (ref 98–111)
CO2: 19 MMOL/L (ref 22–29)
CREAT SERPL-MCNC: 1 MG/DL (ref 0.5–1.2)
GFR NON-AFRICAN AMERICAN: >60
GLUCOSE BLD-MCNC: 102 MG/DL (ref 74–109)
POTASSIUM SERPL-SCNC: 4.3 MMOL/L (ref 3.5–5)
SODIUM BLD-SCNC: 139 MMOL/L (ref 136–145)

## 2019-03-16 PROCEDURE — 36415 COLL VENOUS BLD VENIPUNCTURE: CPT

## 2019-03-16 PROCEDURE — G0378 HOSPITAL OBSERVATION PER HR: HCPCS

## 2019-03-16 PROCEDURE — 80048 BASIC METABOLIC PNL TOTAL CA: CPT

## 2019-03-16 PROCEDURE — 6370000000 HC RX 637 (ALT 250 FOR IP): Performed by: INTERNAL MEDICINE

## 2019-03-16 PROCEDURE — 99217 PR OBSERVATION CARE DISCHARGE MANAGEMENT: CPT | Performed by: INTERNAL MEDICINE

## 2019-03-16 RX ADMIN — Medication 100 MG: at 08:53

## 2019-03-16 RX ADMIN — HYDROCODONE BITARTRATE AND ACETAMINOPHEN 1 TABLET: 5; 325 TABLET ORAL at 06:13

## 2019-03-16 RX ADMIN — SUCRALFATE 1 G: 1 TABLET ORAL at 08:53

## 2019-03-16 RX ADMIN — FOLIC ACID 1 MG: 1 TABLET ORAL at 08:53

## 2019-03-16 ASSESSMENT — PAIN SCALES - GENERAL: PAINLEVEL_OUTOF10: 8

## 2019-03-19 LAB
EKG P AXIS: 63 DEGREES
EKG P-R INTERVAL: 164 MS
EKG Q-T INTERVAL: 306 MS
EKG QRS DURATION: 82 MS
EKG QTC CALCULATION (BAZETT): 414 MS
EKG T AXIS: 20 DEGREES

## 2020-02-02 ENCOUNTER — HOSPITAL ENCOUNTER (INPATIENT)
Age: 65
LOS: 3 days | Discharge: HOME | DRG: 469 | End: 2020-02-05
Attending: EMERGENCY MEDICINE | Admitting: INTERNAL MEDICINE
Payer: COMMERCIAL

## 2020-02-02 LAB
ALBUMIN SERPL-MCNC: 4.2 G/DL (ref 3.5–5.2)
ALP BLD-CCNC: 69 U/L (ref 40–130)
ALT SERPL-CCNC: 9 U/L (ref 5–41)
ANION GAP SERPL CALCULATED.3IONS-SCNC: 20 MMOL/L (ref 7–19)
AST SERPL-CCNC: 23 U/L (ref 5–40)
BASOPHILS ABSOLUTE: 0.1 K/UL (ref 0–0.2)
BASOPHILS RELATIVE PERCENT: 1.7 % (ref 0–1)
BILIRUB SERPL-MCNC: <0.2 MG/DL (ref 0.2–1.2)
BUN BLDV-MCNC: 40 MG/DL (ref 8–23)
CALCIUM SERPL-MCNC: 8.4 MG/DL (ref 8.8–10.2)
CHLORIDE BLD-SCNC: 101 MMOL/L (ref 98–111)
CO2: 15 MMOL/L (ref 22–29)
CREAT SERPL-MCNC: 2.4 MG/DL (ref 0.5–1.2)
EOSINOPHILS ABSOLUTE: 0.2 K/UL (ref 0–0.6)
EOSINOPHILS RELATIVE PERCENT: 3.4 % (ref 0–5)
ETHANOL: 414 MG/DL (ref 0–0.08)
GFR NON-AFRICAN AMERICAN: 28
GLUCOSE BLD-MCNC: 115 MG/DL (ref 74–109)
HCT VFR BLD CALC: 32.4 % (ref 42–52)
HEMOGLOBIN: 10.5 G/DL (ref 14–18)
IMMATURE GRANULOCYTES #: 0 K/UL
LYMPHOCYTES ABSOLUTE: 1.7 K/UL (ref 1.1–4.5)
LYMPHOCYTES RELATIVE PERCENT: 31.5 % (ref 20–40)
MCH RBC QN AUTO: 36.3 PG (ref 27–31)
MCHC RBC AUTO-ENTMCNC: 32.4 G/DL (ref 33–37)
MCV RBC AUTO: 112.1 FL (ref 80–94)
MONOCYTES ABSOLUTE: 0.5 K/UL (ref 0–0.9)
MONOCYTES RELATIVE PERCENT: 9 % (ref 0–10)
NEUTROPHILS ABSOLUTE: 2.9 K/UL (ref 1.5–7.5)
NEUTROPHILS RELATIVE PERCENT: 54 % (ref 50–65)
PDW BLD-RTO: 15.7 % (ref 11.5–14.5)
PLATELET # BLD: 225 K/UL (ref 130–400)
PMV BLD AUTO: 9.4 FL (ref 9.4–12.4)
POTASSIUM SERPL-SCNC: 5 MMOL/L (ref 3.5–5)
RBC # BLD: 2.89 M/UL (ref 4.7–6.1)
SODIUM BLD-SCNC: 136 MMOL/L (ref 136–145)
TOTAL CK: 267 U/L (ref 39–308)
TOTAL PROTEIN: 7 G/DL (ref 6.6–8.7)
WBC # BLD: 5.3 K/UL (ref 4.8–10.8)

## 2020-02-02 PROCEDURE — 6370000000 HC RX 637 (ALT 250 FOR IP): Performed by: EMERGENCY MEDICINE

## 2020-02-02 PROCEDURE — 82550 ASSAY OF CK (CPK): CPT

## 2020-02-02 PROCEDURE — 99285 EMERGENCY DEPT VISIT HI MDM: CPT

## 2020-02-02 PROCEDURE — 85025 COMPLETE CBC W/AUTO DIFF WBC: CPT

## 2020-02-02 PROCEDURE — G0480 DRUG TEST DEF 1-7 CLASSES: HCPCS

## 2020-02-02 PROCEDURE — 80053 COMPREHEN METABOLIC PANEL: CPT

## 2020-02-02 PROCEDURE — 36415 COLL VENOUS BLD VENIPUNCTURE: CPT

## 2020-02-02 PROCEDURE — 2580000003 HC RX 258: Performed by: EMERGENCY MEDICINE

## 2020-02-02 PROCEDURE — 1210000000 HC MED SURG R&B

## 2020-02-02 RX ORDER — PANTOPRAZOLE SODIUM 20 MG/1
20 TABLET, DELAYED RELEASE ORAL
Status: DISCONTINUED | OUTPATIENT
Start: 2020-02-03 | End: 2020-02-05 | Stop reason: HOSPADM

## 2020-02-02 RX ORDER — LORAZEPAM 2 MG/ML
4 INJECTION INTRAMUSCULAR
Status: DISCONTINUED | OUTPATIENT
Start: 2020-02-02 | End: 2020-02-04

## 2020-02-02 RX ORDER — LORAZEPAM 1 MG/1
1 TABLET ORAL
Status: DISCONTINUED | OUTPATIENT
Start: 2020-02-02 | End: 2020-02-05 | Stop reason: HOSPADM

## 2020-02-02 RX ORDER — FOLIC ACID 1 MG/1
1 TABLET ORAL DAILY
Status: DISCONTINUED | OUTPATIENT
Start: 2020-02-02 | End: 2020-02-05 | Stop reason: HOSPADM

## 2020-02-02 RX ORDER — LORAZEPAM 1 MG/1
4 TABLET ORAL
Status: DISCONTINUED | OUTPATIENT
Start: 2020-02-02 | End: 2020-02-04

## 2020-02-02 RX ORDER — LORAZEPAM 1 MG/1
3 TABLET ORAL
Status: DISCONTINUED | OUTPATIENT
Start: 2020-02-02 | End: 2020-02-04

## 2020-02-02 RX ORDER — SODIUM CHLORIDE 0.9 % (FLUSH) 0.9 %
10 SYRINGE (ML) INJECTION PRN
Status: DISCONTINUED | OUTPATIENT
Start: 2020-02-02 | End: 2020-02-05 | Stop reason: HOSPADM

## 2020-02-02 RX ORDER — ASPIRIN 81 MG/1
81 TABLET ORAL DAILY
Status: DISCONTINUED | OUTPATIENT
Start: 2020-02-03 | End: 2020-02-05 | Stop reason: HOSPADM

## 2020-02-02 RX ORDER — DULOXETIN HYDROCHLORIDE 30 MG/1
30 CAPSULE, DELAYED RELEASE ORAL DAILY
Status: DISCONTINUED | OUTPATIENT
Start: 2020-02-03 | End: 2020-02-03

## 2020-02-02 RX ORDER — HEPARIN SODIUM 5000 [USP'U]/ML
5000 INJECTION, SOLUTION INTRAVENOUS; SUBCUTANEOUS EVERY 8 HOURS SCHEDULED
Status: DISCONTINUED | OUTPATIENT
Start: 2020-02-02 | End: 2020-02-05 | Stop reason: HOSPADM

## 2020-02-02 RX ORDER — SODIUM CHLORIDE, SODIUM LACTATE, POTASSIUM CHLORIDE, CALCIUM CHLORIDE 600; 310; 30; 20 MG/100ML; MG/100ML; MG/100ML; MG/100ML
INJECTION, SOLUTION INTRAVENOUS CONTINUOUS
Status: ACTIVE | OUTPATIENT
Start: 2020-02-02 | End: 2020-02-03

## 2020-02-02 RX ORDER — ATORVASTATIN CALCIUM 40 MG/1
40 TABLET, FILM COATED ORAL DAILY
Status: DISCONTINUED | OUTPATIENT
Start: 2020-02-03 | End: 2020-02-05 | Stop reason: HOSPADM

## 2020-02-02 RX ORDER — THIAMINE MONONITRATE (VIT B1) 100 MG
50 TABLET ORAL ONCE
Status: COMPLETED | OUTPATIENT
Start: 2020-02-02 | End: 2020-02-02

## 2020-02-02 RX ORDER — LISINOPRIL 20 MG/1
20 TABLET ORAL DAILY
Status: ON HOLD | COMMUNITY
End: 2022-06-20 | Stop reason: HOSPADM

## 2020-02-02 RX ORDER — 0.9 % SODIUM CHLORIDE 0.9 %
1000 INTRAVENOUS SOLUTION INTRAVENOUS ONCE
Status: COMPLETED | OUTPATIENT
Start: 2020-02-02 | End: 2020-02-03

## 2020-02-02 RX ORDER — LORAZEPAM 2 MG/ML
1 INJECTION INTRAMUSCULAR
Status: DISCONTINUED | OUTPATIENT
Start: 2020-02-02 | End: 2020-02-05 | Stop reason: HOSPADM

## 2020-02-02 RX ORDER — LORAZEPAM 2 MG/ML
3 INJECTION INTRAMUSCULAR
Status: DISCONTINUED | OUTPATIENT
Start: 2020-02-02 | End: 2020-02-04

## 2020-02-02 RX ORDER — ALBUTEROL SULFATE 2.5 MG/3ML
2.5 SOLUTION RESPIRATORY (INHALATION)
Status: DISCONTINUED | OUTPATIENT
Start: 2020-02-02 | End: 2020-02-05 | Stop reason: HOSPADM

## 2020-02-02 RX ORDER — OMEPRAZOLE 20 MG/1
20 CAPSULE, DELAYED RELEASE ORAL DAILY
Status: ON HOLD | COMMUNITY
End: 2020-02-03

## 2020-02-02 RX ORDER — THIAMINE MONONITRATE (VIT B1) 100 MG
100 TABLET ORAL DAILY
Status: DISCONTINUED | OUTPATIENT
Start: 2020-02-03 | End: 2020-02-05 | Stop reason: HOSPADM

## 2020-02-02 RX ORDER — DULOXETIN HYDROCHLORIDE 30 MG/1
30 CAPSULE, DELAYED RELEASE ORAL DAILY
Status: ON HOLD | COMMUNITY
End: 2022-06-20 | Stop reason: HOSPADM

## 2020-02-02 RX ORDER — LORAZEPAM 1 MG/1
2 TABLET ORAL
Status: DISCONTINUED | OUTPATIENT
Start: 2020-02-02 | End: 2020-02-05 | Stop reason: HOSPADM

## 2020-02-02 RX ORDER — LORAZEPAM 2 MG/ML
2 INJECTION INTRAMUSCULAR
Status: DISCONTINUED | OUTPATIENT
Start: 2020-02-02 | End: 2020-02-05 | Stop reason: HOSPADM

## 2020-02-02 RX ORDER — CLOPIDOGREL BISULFATE 75 MG/1
75 TABLET ORAL DAILY
Status: DISCONTINUED | OUTPATIENT
Start: 2020-02-03 | End: 2020-02-05 | Stop reason: HOSPADM

## 2020-02-02 RX ORDER — ATORVASTATIN CALCIUM 40 MG/1
40 TABLET, FILM COATED ORAL DAILY
COMMUNITY

## 2020-02-02 RX ADMIN — SODIUM CHLORIDE 1000 ML: 9 INJECTION, SOLUTION INTRAVENOUS at 21:42

## 2020-02-02 RX ADMIN — Medication 50 MG: at 21:42

## 2020-02-02 ASSESSMENT — ENCOUNTER SYMPTOMS
DIARRHEA: 0
BACK PAIN: 1
SHORTNESS OF BREATH: 0
EYE PAIN: 0
VOMITING: 0
ABDOMINAL PAIN: 0

## 2020-02-03 LAB
AMPHETAMINE SCREEN, URINE: NEGATIVE
BARBITURATE SCREEN URINE: NEGATIVE
BENZODIAZEPINE SCREEN, URINE: POSITIVE
BILIRUBIN URINE: NEGATIVE
BLOOD, URINE: NEGATIVE
CANNABINOID SCREEN URINE: POSITIVE
CLARITY: CLEAR
COCAINE METABOLITE SCREEN URINE: NEGATIVE
COLOR: YELLOW
ETHANOL: 288 MG/DL (ref 0–0.08)
GLUCOSE URINE: NEGATIVE MG/DL
KETONES, URINE: NEGATIVE MG/DL
LEUKOCYTE ESTERASE, URINE: NEGATIVE
Lab: ABNORMAL
NITRITE, URINE: NEGATIVE
OPIATE SCREEN URINE: NEGATIVE
PH UA: 5 (ref 5–8)
PROTEIN UA: NEGATIVE MG/DL
SPECIFIC GRAVITY UA: 1.01 (ref 1–1.03)
URINE REFLEX TO CULTURE: NORMAL
UROBILINOGEN, URINE: 0.2 E.U./DL

## 2020-02-03 PROCEDURE — 6370000000 HC RX 637 (ALT 250 FOR IP): Performed by: HOSPITALIST

## 2020-02-03 PROCEDURE — 2580000003 HC RX 258: Performed by: HOSPITALIST

## 2020-02-03 PROCEDURE — 6360000002 HC RX W HCPCS: Performed by: HOSPITALIST

## 2020-02-03 PROCEDURE — 80307 DRUG TEST PRSMV CHEM ANLYZR: CPT

## 2020-02-03 PROCEDURE — 99254 IP/OBS CNSLTJ NEW/EST MOD 60: CPT | Performed by: PSYCHIATRY & NEUROLOGY

## 2020-02-03 PROCEDURE — 36415 COLL VENOUS BLD VENIPUNCTURE: CPT

## 2020-02-03 PROCEDURE — 81003 URINALYSIS AUTO W/O SCOPE: CPT

## 2020-02-03 PROCEDURE — 1210000000 HC MED SURG R&B

## 2020-02-03 PROCEDURE — G0480 DRUG TEST DEF 1-7 CLASSES: HCPCS

## 2020-02-03 PROCEDURE — 6370000000 HC RX 637 (ALT 250 FOR IP): Performed by: PSYCHIATRY & NEUROLOGY

## 2020-02-03 RX ORDER — SODIUM CHLORIDE 0.9 % (FLUSH) 0.9 %
10 SYRINGE (ML) INJECTION EVERY 12 HOURS SCHEDULED
Status: DISCONTINUED | OUTPATIENT
Start: 2020-02-03 | End: 2020-02-05 | Stop reason: HOSPADM

## 2020-02-03 RX ORDER — DIPHENHYDRAMINE HCL 25 MG
25 TABLET ORAL EVERY 6 HOURS PRN
Status: DISCONTINUED | OUTPATIENT
Start: 2020-02-03 | End: 2020-02-05 | Stop reason: HOSPADM

## 2020-02-03 RX ORDER — LANOLIN ALCOHOL/MO/W.PET/CERES
3 CREAM (GRAM) TOPICAL NIGHTLY PRN
Status: DISCONTINUED | OUTPATIENT
Start: 2020-02-03 | End: 2020-02-03

## 2020-02-03 RX ORDER — NALTREXONE HYDROCHLORIDE 50 MG/1
25 TABLET, FILM COATED ORAL
Status: DISCONTINUED | OUTPATIENT
Start: 2020-02-04 | End: 2020-02-05 | Stop reason: HOSPADM

## 2020-02-03 RX ORDER — UREA 10 %
1 LOTION (ML) TOPICAL NIGHTLY PRN
Status: DISCONTINUED | OUTPATIENT
Start: 2020-02-03 | End: 2020-02-03

## 2020-02-03 RX ORDER — ACETAMINOPHEN 325 MG/1
650 TABLET ORAL EVERY 4 HOURS PRN
Status: DISCONTINUED | OUTPATIENT
Start: 2020-02-03 | End: 2020-02-05 | Stop reason: HOSPADM

## 2020-02-03 RX ORDER — SODIUM CHLORIDE, SODIUM LACTATE, POTASSIUM CHLORIDE, CALCIUM CHLORIDE 600; 310; 30; 20 MG/100ML; MG/100ML; MG/100ML; MG/100ML
INJECTION, SOLUTION INTRAVENOUS CONTINUOUS
Status: DISCONTINUED | OUTPATIENT
Start: 2020-02-03 | End: 2020-02-05 | Stop reason: HOSPADM

## 2020-02-03 RX ORDER — DIPHENHYDRAMINE HCL 25 MG
25 TABLET ORAL EVERY 6 HOURS PRN
Status: ON HOLD | COMMUNITY
End: 2022-06-20 | Stop reason: HOSPADM

## 2020-02-03 RX ORDER — LANOLIN ALCOHOL/MO/W.PET/CERES
3 CREAM (GRAM) TOPICAL NIGHTLY
Status: DISCONTINUED | OUTPATIENT
Start: 2020-02-03 | End: 2020-02-05 | Stop reason: HOSPADM

## 2020-02-03 RX ORDER — POLYETHYLENE GLYCOL 3350 17 G/17G
17 POWDER, FOR SOLUTION ORAL DAILY PRN
Status: DISCONTINUED | OUTPATIENT
Start: 2020-02-03 | End: 2020-02-05 | Stop reason: HOSPADM

## 2020-02-03 RX ORDER — CHLORDIAZEPOXIDE HYDROCHLORIDE 25 MG/1
25 CAPSULE, GELATIN COATED ORAL 2 TIMES DAILY
Status: DISCONTINUED | OUTPATIENT
Start: 2020-02-03 | End: 2020-02-05 | Stop reason: HOSPADM

## 2020-02-03 RX ADMIN — SODIUM CHLORIDE, POTASSIUM CHLORIDE, SODIUM LACTATE AND CALCIUM CHLORIDE: 600; 310; 30; 20 INJECTION, SOLUTION INTRAVENOUS at 01:39

## 2020-02-03 RX ADMIN — HEPARIN SODIUM 5000 UNITS: 5000 INJECTION INTRAVENOUS; SUBCUTANEOUS at 15:36

## 2020-02-03 RX ADMIN — FOLIC ACID 1 MG: 1 TABLET ORAL at 01:39

## 2020-02-03 RX ADMIN — HEPARIN SODIUM 5000 UNITS: 5000 INJECTION INTRAVENOUS; SUBCUTANEOUS at 20:37

## 2020-02-03 RX ADMIN — LORAZEPAM 2 MG: 1 TABLET ORAL at 01:39

## 2020-02-03 RX ADMIN — PANTOPRAZOLE SODIUM 20 MG: 20 TABLET, DELAYED RELEASE ORAL at 06:15

## 2020-02-03 RX ADMIN — ASPIRIN 81 MG: 81 TABLET, COATED ORAL at 08:20

## 2020-02-03 RX ADMIN — MELATONIN 3 MG: at 20:37

## 2020-02-03 RX ADMIN — HEPARIN SODIUM 5000 UNITS: 5000 INJECTION INTRAVENOUS; SUBCUTANEOUS at 06:15

## 2020-02-03 RX ADMIN — LORAZEPAM 2 MG: 1 TABLET ORAL at 23:15

## 2020-02-03 RX ADMIN — SODIUM CHLORIDE, POTASSIUM CHLORIDE, SODIUM LACTATE AND CALCIUM CHLORIDE: 600; 310; 30; 20 INJECTION, SOLUTION INTRAVENOUS at 15:36

## 2020-02-03 RX ADMIN — CLOPIDOGREL BISULFATE 75 MG: 75 TABLET ORAL at 08:20

## 2020-02-03 RX ADMIN — ACETAMINOPHEN 650 MG: 325 TABLET ORAL at 23:15

## 2020-02-03 RX ADMIN — SODIUM CHLORIDE, PRESERVATIVE FREE 10 ML: 5 INJECTION INTRAVENOUS at 08:25

## 2020-02-03 RX ADMIN — HEPARIN SODIUM 5000 UNITS: 5000 INJECTION INTRAVENOUS; SUBCUTANEOUS at 01:39

## 2020-02-03 RX ADMIN — CHLORDIAZEPOXIDE HYDROCHLORIDE 25 MG: 25 CAPSULE ORAL at 15:36

## 2020-02-03 RX ADMIN — SODIUM CHLORIDE, POTASSIUM CHLORIDE, SODIUM LACTATE AND CALCIUM CHLORIDE: 600; 310; 30; 20 INJECTION, SOLUTION INTRAVENOUS at 20:37

## 2020-02-03 RX ADMIN — LORAZEPAM 3 MG: 1 TABLET ORAL at 04:15

## 2020-02-03 RX ADMIN — CHLORDIAZEPOXIDE HYDROCHLORIDE 25 MG: 25 CAPSULE ORAL at 20:37

## 2020-02-03 RX ADMIN — Medication 100 MG: at 08:20

## 2020-02-03 RX ADMIN — FOLIC ACID 1 MG: 1 TABLET ORAL at 08:20

## 2020-02-03 ASSESSMENT — PAIN SCALES - GENERAL
PAINLEVEL_OUTOF10: 4
PAINLEVEL_OUTOF10: 0
PAINLEVEL_OUTOF10: 8
PAINLEVEL_OUTOF10: 10

## 2020-02-03 ASSESSMENT — PAIN DESCRIPTION - ORIENTATION: ORIENTATION: LOWER

## 2020-02-03 ASSESSMENT — PAIN - FUNCTIONAL ASSESSMENT: PAIN_FUNCTIONAL_ASSESSMENT: PREVENTS OR INTERFERES SOME ACTIVE ACTIVITIES AND ADLS

## 2020-02-03 ASSESSMENT — PAIN DESCRIPTION - ONSET: ONSET: ON-GOING

## 2020-02-03 ASSESSMENT — PAIN DESCRIPTION - PROGRESSION
CLINICAL_PROGRESSION: GRADUALLY WORSENING
CLINICAL_PROGRESSION: GRADUALLY WORSENING

## 2020-02-03 ASSESSMENT — PAIN DESCRIPTION - FREQUENCY: FREQUENCY: CONTINUOUS

## 2020-02-03 ASSESSMENT — PAIN DESCRIPTION - LOCATION: LOCATION: NECK;BACK

## 2020-02-03 ASSESSMENT — PAIN DESCRIPTION - DESCRIPTORS: DESCRIPTORS: CONSTANT;HEADACHE;THROBBING

## 2020-02-03 ASSESSMENT — PAIN DESCRIPTION - PAIN TYPE: TYPE: CHRONIC PAIN

## 2020-02-03 NOTE — PROGRESS NOTES
4 Eyes Skin Assessment    [facility] is being assessed upon: Admission    I agree that Di Hewitt, along with Kenia Denton (either 2 RN's or 1 LPN and 1 RN) have performed a thorough Head to Toe Skin Assessment on the patient. ALL assessment sites listed below have been assessed. Areas assessed by both nurses:     [x]   Head, Face, and Ears   [x]   Shoulders, Back, and Chest  [x]   Arms, Elbows, and Hands   [x]   Coccyx, Sacrum, and Ischium  [x]   Legs, Feet, and Heels    Does the Patient have Skin Breakdown?  No    Efren Prevention initiated: No  Wound Care Orders initiated: No    [facility] nurse consulted for Pressure Injury (Stage 3,4, Unstageable, DTI, NWPT, and Complex wounds) and New or Established Ostomies: No        Primary Nurse eSignature: Bayron Abdalla RN on 2/3/2020 at 5:58 AM      Co-Signer eSignature: Electronically signed by Kenia Denton RN on 2/3/20 at 6:03 AM
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
## 2020-02-03 NOTE — PROGRESS NOTES
Alcohol Abuse Mother     Diabetes Father     Alcohol Abuse Father     Early Death Brother     Alcohol Abuse Brother     Alcohol Abuse Sister        Physical Examination      Vitals:  BP (!) 141/78   Pulse 100   Temp 97.9 °F (36.6 °C) (Temporal)   Resp 16   Ht 5' 9\" (1.753 m)   Wt 161 lb (73 kg)   SpO2 97%   BMI 23.78 kg/m²   Temp (24hrs), Av.1 °F (36.7 °C), Min:97.6 °F (36.4 °C), Max:98.8 °F (37.1 °C)      I/O (24Hr): Intake/Output Summary (Last 24 hours) at 2/3/2020 1332  Last data filed at 2/3/2020 0917  Gross per 24 hour   Intake --   Output 1200 ml   Net -1200 ml       Physical Exam  General: No acute distress lying comfortably in bed. HEENT: Atraumatic normocephalic  Cardiac: Normal S1-S2 no murmurs rub or gallop. Respiratory: clear To auscultation bilaterally, no rhonchi, positive wheezing  Abdomen: nontender to palpation, no organomegaly noted. Extremities: no tenderness, no edema, moves all extremities  Psych: Unable to fully assess. Labs/Imaging/Diagnostics   Labs:  CBC:  Recent Labs     20   WBC 5.3   RBC 2.89*   HGB 10.5*   HCT 32.4*   .1*   RDW 15.7*        CHEMISTRIES:  Recent Labs     20      K 5.0      CO2 15*   BUN 40*   CREATININE 2.4*   GLUCOSE 115*     PT/INR:No results for input(s): PROTIME, INR in the last 72 hours. APTT:No results for input(s): APTT in the last 72 hours. LIVER PROFILE:  Recent Labs     20   AST 23   ALT 9   BILITOT <0.2   ALKPHOS 69       Imaging Last 24 Hours:  No results found. Assessment        Hospital Problems           Last Modified POA    LES (acute kidney injury) ([facility] [address] 2020 Yes          Plan:          57-year-old male with a history of alcohol use disorder, polysubstance use, GERD, hyperlipidemia, hypertension, spinal stenosis, COPD, presented to the hospital with concerns of alcohol use disorder and intoxication.       Alcohol use disorder with intoxication on admission

## 2020-02-03 NOTE — ED TRIAGE NOTES
Drinks a pint a day but says he smoked \"weed' today and drank on top of that.  He has decided he wants helped

## 2020-02-03 NOTE — ED PROVIDER NOTES
[facility]  eMERGENCY dEPARTMENT eNCOUnter      Pt Name: Segundo Ramos  MRN: [id number]  [place] 1955  Date of evaluation: 2/2/2020  Provider: Orin Hilario MD    CHIEF COMPLAINT       Chief Complaint   Patient presents with    Alcohol Intoxication         HISTORY OF PRESENT ILLNESS   (Location/Symptom, Timing/Onset,Context/Setting, Quality, Duration, Modifying Factors, Severity)  Note limiting factors. Segundo Ramos is a 60 y.o. male who presents to the emergency department because he wants help stopping drinking and stopping smoking. Patient intoxicated. Tells me he drinks vodka heavily on a regular basis. Tells me he drinks to treat his chronic neck and back pain. Tells me the only reason he came tonight was because he wants help to stop drinking, stop smoking and to stop using marijuana. Has no complaints otherwise. Admits he drank just prior to arrival.  No fevers chills or other constitutional symptoms. Denies any pain. Abdominal pain vomiting diarrhea. Denies HI or SI. Denies depression. HPI    NursingNotes were reviewed. REVIEW OF SYSTEMS    (2-9 systems for level 4, 10 or more for level 5)     Review of Systems   Constitutional: Negative for fever. Eyes: Negative for pain. Respiratory: Negative for shortness of breath. Cardiovascular: Negative for chest pain and palpitations. Gastrointestinal: Negative for abdominal pain, diarrhea and vomiting. Genitourinary: Negative for dysuria. Musculoskeletal: Positive for back pain (chronic) and neck pain (chronic). Skin: Negative for rash. Neurological: Negative for weakness and headaches. Psychiatric/Behavioral: Negative for suicidal ideas. All other systems reviewed and are negative. A complete review of systems was performed and is negative except as noted above in the HPI.        PAST MEDICAL HISTORY     Past Medical History:   Diagnosis Date    Alcohol abuse     Cerebellar stroke, acute ([facility] [address] tobacco: Never Used   Substance and Sexual Activity    Alcohol use: Yes     Alcohol/week: 0.0 standard drinks     Comment: a half a  pint of vodka daily    Drug use: Yes     Types: Marijuana     Comment: occassionally.  Sexual activity: Yes     Partners: Female   Lifestyle    Physical activity:     Days per week: None     Minutes per session: None    Stress: None   Relationships    Social connections:     Talks on phone: None     Gets together: None     Attends Rastafari service: None     Active member of club or organization: None     Attends meetings of clubs or organizations: None     Relationship status: None    Intimate partner violence:     Fear of current or ex partner: None     Emotionally abused: None     Physically abused: None     Forced sexual activity: None   Other Topics Concern    None   Social History Narrative    None       SCREENINGS             PHYSICAL EXAM    (up to 7 for level 4, 8 or more for level 5)     ED Triage Vitals   BP Temp Temp Source Pulse Resp SpO2 Height Weight   02/02/20 2017 02/02/20 2015 02/02/20 2015 02/02/20 2017 02/02/20 2015 02/02/20 2017 02/02/20 2014 02/02/20 2014   117/80 98.8 °F (37.1 °C) Oral 127 18 95 % 5' 9\" (1.753 m) 161 lb (73 kg)       Physical Exam  Vitals signs reviewed. Constitutional:       General: He is not in acute distress. Appearance: He is well-developed. Comments: Disheveled and appears intoxicated   HENT:      Head: Normocephalic and atraumatic. Mouth/Throat:      Mouth: Mucous membranes are moist.      Pharynx: Oropharynx is clear. Eyes:      General: No scleral icterus. Pupils: Pupils are equal, round, and reactive to light. Neck:      Musculoskeletal: Normal range of motion and neck supple. Vascular: No JVD. Cardiovascular:      Rate and Rhythm: Normal rate and regular rhythm. Heart sounds: Normal heart sounds. Pulmonary:      Effort: Pulmonary effort is normal. No respiratory distress.       Breath sounds: Normal breath sounds. Abdominal:      General: There is no distension. Palpations: Abdomen is soft. Tenderness: There is no abdominal tenderness. Musculoskeletal:         General: No tenderness. Skin:     General: Skin is warm and dry. Capillary Refill: Capillary refill takes less than 2 seconds. Neurological:      General: No focal deficit present. Mental Status: He is alert and oriented to person, place, and time. Sensory: Sensation is intact. Motor: Motor function is intact. Psychiatric:         Mood and Affect: Mood normal.         Behavior: Behavior normal.         DIAGNOSTIC RESULTS       LABS:  Labs Reviewed   CBC WITH AUTO DIFFERENTIAL - Abnormal; Notable for the following components:       Result Value    RBC 2.89 (*)     Hemoglobin 10.5 (*)     Hematocrit 32.4 (*)     .1 (*)     MCH 36.3 (*)     MCHC 32.4 (*)     RDW 15.7 (*)     Basophils % 1.7 (*)     All other components within normal limits   COMPREHENSIVE METABOLIC PANEL - Abnormal; Notable for the following components:    CO2 15 (*)     Anion Gap 20 (*)     Glucose 115 (*)     BUN 40 (*)     CREATININE 2.4 (*)     GFR Non-African American 28 (*)     Calcium 8.4 (*)     All other components within normal limits   ETHANOL   CK   URINE DRUG SCREEN   URINE RT REFLEX TO CULTURE       All other labs were within normal range or not returned as of this dictation. EMERGENCY DEPARTMENT COURSE and DIFFERENTIALDIAGNOSIS/MDM:   Vitals:    Vitals:    02/02/20 2014 02/02/20 2015 02/02/20 2017 02/02/20 2145   BP:   117/80 116/78   Pulse:   127 110   Resp:  18 22 18   Temp:  98.8 °F (37.1 °C) 98.3 °F (36.8 °C)    TempSrc:  Oral Oral    SpO2:   95% 95%   Weight: 161 lb (73 kg)      Height: 5' 9\" (1.753 m)          MDM  Patient is nontoxic on exam resting comfortably at this time. Has evidence of LES. Fluid bolus ordered.   Case discussed with hospitalist, Dr. Spring Bernal, who is agreeable plan of care and admission. CONSULTS:  None    PROCEDURES:  Unless otherwise notedbelow, none     Procedures    FINAL IMPRESSION     1. Acute alcoholic intoxication without complication ([facility] [address]    2.  LES (acute kidney injury) [facility])          DISPOSITION/PLAN   DISPOSITION Decision To Admit 02/02/2020 11:15:59 PM      PATIENT REFERRED TO:  @FUP@    DISCHARGE MEDICATIONS:  New Prescriptions    No medications on file          (Please note that portions of this note were completed with a voice recognition program.  Efforts were made to edit the dictations butoccasionally words are mis-transcribed.)    Otilia Guerra MD (electronically signed)  AttendingEmergency Physician        Otilia Guerra MD  02/02/20 5899

## 2020-02-03 NOTE — PLAN OF CARE
Problem: Falls - Risk of:  Goal: Will remain free from falls  Description  Will remain free from falls  Outcome: Ongoing  Goal: Absence of physical injury  Description  Absence of physical injury  Outcome: Ongoing     Problem: Pain:  Description  Pain management should include both nonpharmacologic and pharmacologic interventions.   Goal: Pain level will decrease  Description  Pain level will decrease  Outcome: Ongoing  Goal: Control of acute pain  Description  Control of acute pain  Outcome: Ongoing  Goal: Control of chronic pain  Description  Control of chronic pain  Outcome: Ongoing

## 2020-02-03 NOTE — ED NOTES
Pt gowned and monitored. Personal belongs removed and placed at RN station. Sitter at bedside.  VSS and placed on cont NIBP with SAT         Sheela Blanton RN  02/02/20 2029

## 2020-02-03 NOTE — PLAN OF CARE
Problem: Falls - Risk of:  Goal: Will remain free from falls  Description  Will remain free from falls  2/3/2020 1000 by Mp Soto RN  Outcome: Ongoing  2/3/2020 0202 by Manda Fitzgerald RN  Outcome: Ongoing  Goal: Absence of physical injury  Description  Absence of physical injury  2/3/2020 1000 by Mp Soto RN  Outcome: Ongoing  2/3/2020 0202 by Manda Fitzgerald RN  Outcome: Ongoing     Problem: Pain:  Goal: Pain level will decrease  Description  Pain level will decrease  2/3/2020 1000 by Mp Soto RN  Outcome: Ongoing  2/3/2020 0202 by Manda Fitzgerald RN  Outcome: Ongoing  Goal: Control of acute pain  Description  Control of acute pain  2/3/2020 1000 by Mp Soto RN  Outcome: Ongoing  2/3/2020 0202 by Manda Fitzgerald RN  Outcome: Ongoing  Goal: Control of chronic pain  Description  Control of chronic pain  2/3/2020 1000 by Mp Soto RN  Outcome: Ongoing  2/3/2020 0202 by Manda Fitzgerald RN  Outcome: Ongoing

## 2020-02-03 NOTE — H&P
Intravenous Q1H PRN Libby Bird MD        [START ON 2/3/2020] aspirin EC tablet 81 mg  81 mg Oral Daily MD Nano Rogers [place] ON 2/3/2020] atorvastatin (LIPITOR) tablet 40 mg  40 mg Oral Daily MD Nano Rogers [place] ON 2/3/2020] clopidogrel (PLAVIX) tablet 75 mg  75 mg Oral Daily MD Nano Rogers [place] ON 2/3/2020] DULoxetine (CYMBALTA) extended release capsule 30 mg  30 mg Oral Daily MD Nano Rogers [place] ON 2/3/2020] pantoprazole (PROTONIX) tablet 20 mg  20 mg Oral QAM AC Libby Bird MD        albuterol (PROVENTIL) nebulizer solution 2.5 mg  2.5 mg Nebulization Q1H PRN Libby Bird MD        lactated ringers infusion   Intravenous Continuous Libby Bird MD         Current Outpatient Medications   Medication Sig Dispense Refill    DULoxetine (CYMBALTA) 30 MG extended release capsule Take 30 mg by mouth daily      atorvastatin (LIPITOR) 40 MG tablet Take 40 mg by mouth daily      lisinopril (PRINIVIL;ZESTRIL) 20 MG tablet Take 20 mg by mouth daily      omeprazole (PRILOSEC) 20 MG delayed release capsule Take 20 mg by mouth daily      loratadine (CLARITIN) 10 MG capsule Take 10 mg by mouth daily as needed       clopidogrel (PLAVIX) 75 MG tablet Take 1 tablet by mouth daily 30 tablet 1    aspirin 81 MG EC tablet Take 1 tablet by mouth daily 30 tablet 0    albuterol sulfate  (90 BASE) MCG/ACT inhaler Inhale 2 puffs into the lungs every 6 hours as needed for Wheezing           OBJECTIVE:    Vitals:    02/02/20 2145   BP: 116/78   Pulse: 110   Resp: 18   Temp:    SpO2: 95%     /78   Pulse 89   Temp 98.3 °F (36.8 °C) (Oral)   Resp 18   Ht 5' 9\" (1.753 m)   Wt 161 lb (73 kg)   SpO2 96%   BMI 23.78 kg/m²     Physical Exam  Vitals signs reviewed. Constitutional:       General: He is not in acute distress. Appearance: Normal appearance. He is normal weight. He is not ill-appearing or toxic-appearing.    HENT:      Head: Normocephalic and atraumatic. Nose: No congestion or rhinorrhea. Eyes:      General:         Right eye: No discharge. Left eye: No discharge. Neck:      Musculoskeletal: Neck supple. Comments: Trachea appears midline  Cardiovascular:      Rate and Rhythm: Normal rate and regular rhythm. Heart sounds: No murmur. No friction rub. No gallop. Pulmonary:      Effort: No respiratory distress. Breath sounds: No stridor. Wheezing present. No rhonchi or rales. Abdominal:      Palpations: Abdomen is soft. Tenderness: There is no abdominal tenderness. There is no guarding or rebound. Lymphadenopathy:      Head:      Right side of head: No submental, submandibular, tonsillar, preauricular, posterior auricular or occipital adenopathy. Left side of head: No submental, submandibular, tonsillar, preauricular, posterior auricular or occipital adenopathy. Cervical:      Right cervical: No superficial cervical adenopathy. Left cervical: No superficial cervical adenopathy. Upper Body:      Right upper body: No supraclavicular adenopathy. Left upper body: No supraclavicular adenopathy. Skin:     General: Skin is warm. Comments: nondiaphoretic   Neurological:      Mental Status: He is alert and oriented to person, place, and time. Comments: alert   Psychiatric:         Mood and Affect: Mood normal.         Behavior: Behavior normal.       LABS:  Results for No Wilson (MRN [id number]) as of 2/2/2020 23:02   Ref.  Range 2/2/2020 20:22   Sodium Latest Ref Range: 136 - 145 mmol/L 136   Potassium Latest Ref Range: 3.5 - 5.0 mmol/L 5.0   Chloride Latest Ref Range: 98 - 111 mmol/L 101   CO2 Latest Ref Range: 22 - 29 mmol/L 15 (L)   BUN Latest Ref Range: 8 - 23 mg/dL 40 (H)   Creatinine Latest Ref Range: 0.5 - 1.2 mg/dL 2.4 (H)   Anion Gap Latest Ref Range: 7 - 19 mmol/L 20 (H)   GFR Non- Latest Ref Range: >60  28 (A)   Glucose Latest Ref Range: 74 - 109 mg/dL 115 (H) Calcium Latest Ref Range: 8.8 - 10.2 mg/dL 8.4 (L)   Total Protein Latest Ref Range: 6.6 - 8.7 g/dL 7.0   Total CK Latest Ref Range: 39 - 308 U/L 267   Albumin Latest Ref Range: 3.5 - 5.2 g/dL 4.2   Alk Phos Latest Ref Range: 40 - 130 U/L 69   ALT Latest Ref Range: 5 - 41 U/L 9   AST Latest Ref Range: 5 - 40 U/L 23   Bilirubin Latest Ref Range: 0.2 - 1.2 mg/dL <0.2   Ethanol Lvl Latest Units: mg/dL 414   WBC Latest Ref Range: 4.8 - 10.8 K/uL 5.3   RBC Latest Ref Range: 4.70 - 6.10 M/uL 2.89 (L)   Hemoglobin Quant Latest Ref Range: 14.0 - 18.0 g/dL 10.5 (L)   Hematocrit Latest Ref Range: 42.0 - 52.0 % 32.4 (L)   MCV Latest Ref Range: 80.0 - 94.0 fL 112.1 (H)   MCH Latest Ref Range: 27.0 - 31.0 pg 36.3 (H)   MCHC Latest Ref Range: 33.0 - 37.0 g/dL 32.4 (L)   MPV Latest Ref Range: 9.4 - 12.4 fL 9.4   RDW Latest Ref Range: 11.5 - 14.5 % 15.7 (H)   Platelet Count Latest Ref Range: 130 - 400 K/uL 225   Neutrophils % Latest Ref Range: 50.0 - 65.0 % 54.0   Lymphocyte % Latest Ref Range: 20.0 - 40.0 % 31.5   Monocytes % Latest Ref Range: 0.0 - 10.0 % 9.0   Eosinophils % Latest Ref Range: 0.0 - 5.0 % 3.4   Basophils % Latest Ref Range: 0.0 - 1.0 % 1.7 (H)   Neutrophils Absolute Latest Ref Range: 1.5 - 7.5 K/uL 2.9   Immature Granulocytes # Latest Units: K/uL 0.0   Lymphocytes Absolute Latest Ref Range: 1.1 - 4.5 K/uL 1.7   Monocytes Absolute Latest Ref Range: 0.00 - 0.90 K/uL 0.50   Eosinophils Absolute Latest Ref Range: 0.00 - 0.60 K/uL 0.20   Basophils Absolute Latest Ref Range: 0.00 - 0.20 K/uL 0.10         ASESSMENTS & PLANS:    LES: (Cr 0.7 baseline now at 1.0)  Alcohol Intoxication requesting help: (level >400)  Thiamine and Folic Acid both PO  No Niacin as claims anaphylaxis with Niacin, clearly wrong but we do not know if something with this triggered it or if this was just the flush with niacin  Psych consult in AM for assistance with Alcohol Cessation, no suspicion whatsoever at this time that he needs inpatient

## 2020-02-04 LAB
ANION GAP SERPL CALCULATED.3IONS-SCNC: 12 MMOL/L (ref 7–19)
BASOPHILS ABSOLUTE: 0.1 K/UL (ref 0–0.2)
BASOPHILS RELATIVE PERCENT: 1.3 % (ref 0–1)
BUN BLDV-MCNC: 23 MG/DL (ref 8–23)
CALCIUM SERPL-MCNC: 9 MG/DL (ref 8.8–10.2)
CHLORIDE BLD-SCNC: 103 MMOL/L (ref 98–111)
CO2: 23 MMOL/L (ref 22–29)
CREAT SERPL-MCNC: 1.3 MG/DL (ref 0.5–1.2)
EOSINOPHILS ABSOLUTE: 0.2 K/UL (ref 0–0.6)
EOSINOPHILS RELATIVE PERCENT: 4.4 % (ref 0–5)
ETHANOL: <10 MG/DL (ref 0–0.08)
GFR NON-AFRICAN AMERICAN: 56
GLUCOSE BLD-MCNC: 91 MG/DL (ref 74–109)
HCT VFR BLD CALC: 30.2 % (ref 42–52)
HEMOGLOBIN: 9.8 G/DL (ref 14–18)
IMMATURE GRANULOCYTES #: 0 K/UL
LYMPHOCYTES ABSOLUTE: 1.4 K/UL (ref 1.1–4.5)
LYMPHOCYTES RELATIVE PERCENT: 30.8 % (ref 20–40)
MCH RBC QN AUTO: 35.8 PG (ref 27–31)
MCHC RBC AUTO-ENTMCNC: 32.5 G/DL (ref 33–37)
MCV RBC AUTO: 110.2 FL (ref 80–94)
MONOCYTES ABSOLUTE: 0.3 K/UL (ref 0–0.9)
MONOCYTES RELATIVE PERCENT: 7.3 % (ref 0–10)
NEUTROPHILS ABSOLUTE: 2.5 K/UL (ref 1.5–7.5)
NEUTROPHILS RELATIVE PERCENT: 56 % (ref 50–65)
PDW BLD-RTO: 14.9 % (ref 11.5–14.5)
PLATELET # BLD: 198 K/UL (ref 130–400)
PMV BLD AUTO: 10.2 FL (ref 9.4–12.4)
POTASSIUM REFLEX MAGNESIUM: 4.4 MMOL/L (ref 3.5–5)
RBC # BLD: 2.74 M/UL (ref 4.7–6.1)
SODIUM BLD-SCNC: 138 MMOL/L (ref 136–145)
WBC # BLD: 4.5 K/UL (ref 4.8–10.8)

## 2020-02-04 PROCEDURE — 6370000000 HC RX 637 (ALT 250 FOR IP): Performed by: HOSPITALIST

## 2020-02-04 PROCEDURE — 6360000002 HC RX W HCPCS: Performed by: HOSPITALIST

## 2020-02-04 PROCEDURE — 36415 COLL VENOUS BLD VENIPUNCTURE: CPT

## 2020-02-04 PROCEDURE — 6370000000 HC RX 637 (ALT 250 FOR IP): Performed by: PSYCHIATRY & NEUROLOGY

## 2020-02-04 PROCEDURE — 99233 SBSQ HOSP IP/OBS HIGH 50: CPT | Performed by: PSYCHIATRY & NEUROLOGY

## 2020-02-04 PROCEDURE — 1210000000 HC MED SURG R&B

## 2020-02-04 PROCEDURE — 80048 BASIC METABOLIC PNL TOTAL CA: CPT

## 2020-02-04 PROCEDURE — G0480 DRUG TEST DEF 1-7 CLASSES: HCPCS

## 2020-02-04 PROCEDURE — 2580000003 HC RX 258: Performed by: HOSPITALIST

## 2020-02-04 PROCEDURE — 85025 COMPLETE CBC W/AUTO DIFF WBC: CPT

## 2020-02-04 RX ADMIN — CHLORDIAZEPOXIDE HYDROCHLORIDE 25 MG: 25 CAPSULE ORAL at 09:47

## 2020-02-04 RX ADMIN — HEPARIN SODIUM 5000 UNITS: 5000 INJECTION INTRAVENOUS; SUBCUTANEOUS at 06:16

## 2020-02-04 RX ADMIN — Medication 100 MG: at 09:47

## 2020-02-04 RX ADMIN — HEPARIN SODIUM 5000 UNITS: 5000 INJECTION INTRAVENOUS; SUBCUTANEOUS at 21:27

## 2020-02-04 RX ADMIN — FOLIC ACID 1 MG: 1 TABLET ORAL at 09:46

## 2020-02-04 RX ADMIN — HEPARIN SODIUM 5000 UNITS: 5000 INJECTION INTRAVENOUS; SUBCUTANEOUS at 17:16

## 2020-02-04 RX ADMIN — CHLORDIAZEPOXIDE HYDROCHLORIDE 25 MG: 25 CAPSULE ORAL at 21:27

## 2020-02-04 RX ADMIN — SODIUM CHLORIDE, PRESERVATIVE FREE 10 ML: 5 INJECTION INTRAVENOUS at 09:46

## 2020-02-04 RX ADMIN — CLOPIDOGREL BISULFATE 75 MG: 75 TABLET ORAL at 09:47

## 2020-02-04 RX ADMIN — MELATONIN 3 MG: at 21:27

## 2020-02-04 RX ADMIN — PANTOPRAZOLE SODIUM 20 MG: 20 TABLET, DELAYED RELEASE ORAL at 06:16

## 2020-02-04 RX ADMIN — LORAZEPAM 1 MG: 1 TABLET ORAL at 21:33

## 2020-02-04 RX ADMIN — ATORVASTATIN CALCIUM 40 MG: 40 TABLET, FILM COATED ORAL at 09:46

## 2020-02-04 RX ADMIN — NALTREXONE HYDROCHLORIDE 25 MG: 50 TABLET, FILM COATED ORAL at 09:47

## 2020-02-04 RX ADMIN — ASPIRIN 81 MG: 81 TABLET, COATED ORAL at 09:47

## 2020-02-04 NOTE — PROGRESS NOTES
current alcohol use. He reports current drug use. Frequency: 2.00 times per week. Drug: Marijuana. Family History:   Family History   Problem Relation Age of Onset    Alcohol Abuse Mother     Diabetes Father     Alcohol Abuse Father     Early Death Brother     Alcohol Abuse Brother     Alcohol Abuse Sister        Physical Examination      Vitals:  /82   Pulse 101   Temp 99.5 °F (37.5 °C) (Temporal)   Resp 20   Ht 5' 9\" (1.753 m)   Wt 161 lb (73 kg)   SpO2 98%   BMI 23.78 kg/m²   Temp (24hrs), Av.8 °F (37.1 °C), Min:97.8 °F (36.6 °C), Max:99.5 °F (37.5 °C)      I/O (24Hr): Intake/Output Summary (Last 24 hours) at 2020 1221  Last data filed at 2020 0958  Gross per 24 hour   Intake 2003 ml   Output 3125 ml   Net -1122 ml       Physical Exam  General: No acute distress lying comfortably in bed. HEENT: Atraumatic normocephalic  Cardiac: Normal S1-S2 no murmurs rub or gallop. Respiratory: clear To auscultation bilaterally, no rhonchi, positive wheezing  Abdomen: nontender to palpation, no organomegaly noted. Extremities: no tenderness, no edema, moves all extremities  Psych: Some insight to disease, mood normal, behavior normal, cognition intact. Labs/Imaging/Diagnostics   Labs:  CBC:  Recent Labs     20  0455   WBC 5.3 4.5*   RBC 2.89* 2.74*   HGB 10.5* 9.8*   HCT 32.4* 30.2*   .1* 110.2*   RDW 15.7* 14.9*    198     CHEMISTRIES:  Recent Labs     20  0455    138   K 5.0 4.4    103   CO2 15* 23   BUN 40* 23   CREATININE 2.4* 1.3*   GLUCOSE 115* 91     PT/INR:No results for input(s): PROTIME, INR in the last 72 hours. APTT:No results for input(s): APTT in the last 72 hours. LIVER PROFILE:  Recent Labs     20   AST 23   ALT 9   BILITOT <0.2   ALKPHOS 69       Imaging Last 24 Hours:  No results found.       Assessment        Hospital Problems           Last Modified POA    LES (acute kidney injury) [facility]) 2/2/2020 Yes          Plan:          72-year-old male with a history of alcohol use disorder, polysubstance use, GERD, hyperlipidemia, hypertension, spinal stenosis, COPD, presented to the hospital with concerns of alcohol use disorder and intoxication. Alcohol use disorder with intoxication on admission impending withdrawal  Polysubstance use  CIWA protocol and also on Librium, discontinued 2/backslash 2020. electrolytes per sliding scale  Continue to monitor on telemetry  Continue folic acid as well as thiamine supplements.   katy  Discussed about home associated with taking benzodiazepine while drinking alcohol. LES likely prerenal secondary to dehydration in the setting of alcohol use. Lactated Ringer 125 cc an hour    Anion gap metabolic acidosis: Resolved   likely secondary to alcohol use disorder  Continue IV fluids we will monitor BMP. Tobacco use disorder  Counseled on cessation. Hyperlipidemia: Continue statin    Prior history of CVA: Continue aspirin and Plavix. Depression: Continue Cymbalta    GERD: Continue pantoprazole. Code: Full  Diet[de-identified] Cardiac  DVT prophylaxis: Enoxaparin  Disposition: Likely in a day or 2 pending withdrawal symptoms. Electronically signed by   Miguel Child   Internal Medicine Hospitalist  On 2/4/2020  At 12:28 PM    EMR Dragon/Transcription disclaimer:   Much of this encounter note is an electronic transcription/translation of spoken language to printed text.  The electronic translation of spoken language may permit erroneous, or at times, nonsensical words or phrases to be inadvertently transcribed; although attempts have made to review the note for such errors, some may still exist.

## 2020-02-04 NOTE — PROGRESS NOTES
Memory: Recent and remote appear intact. Impulsivity: Limited. Neurovegitative: Improved appetite and sleep. Insight: Improved. Judgment: Improved.     Data  Lab Results   Component Value Date    WBC 4.5 (L) 02/04/2020    HGB 9.8 (L) 02/04/2020    HCT 30.2 (L) 02/04/2020    .2 (H) 02/04/2020     02/04/2020      Lab Results   Component Value Date     02/04/2020    K 4.4 02/04/2020     02/04/2020    CO2 23 02/04/2020    BUN 23 02/04/2020    CREATININE 1.3 (H) 02/04/2020    GLUCOSE 91 02/04/2020    CALCIUM 9.0 02/04/2020    PROT 7.0 02/02/2020    LABALBU 4.2 02/02/2020    BILITOT <0.2 02/02/2020    ALKPHOS 69 02/02/2020    AST 23 02/02/2020    ALT 9 02/02/2020    LABGLOM 56 (A) 02/04/2020    GLOB 2.2 05/30/2016       Medications    Current Facility-Administered Medications:     sodium chloride flush 0.9 % injection 10 mL, 10 mL, Intravenous, 2 times per day, Nina Saha MD, 10 mL at 02/04/20 0946    polyethylene glycol (GLYCOLAX) packet 17 g, 17 g, Oral, Daily PRN, Nina Saha MD    diphenhydrAMINE (BENADRYL) tablet 25 mg, 25 mg, Oral, Q6H PRN, Nina Saha MD    acetaminophen (TYLENOL) tablet 650 mg, 650 mg, Oral, Q4H PRN, Nina Saha MD, 650 mg at 02/03/20 2315    lactated ringers infusion, , Intravenous, Continuous, Pilar Delong MD, Last Rate: 125 mL/hr at 02/03/20 2037    chlordiazePOXIDE (LIBRIUM) capsule 25 mg, 25 mg, Oral, BID, Pilar Delong MD, 25 mg at 02/04/20 0947    naltrexone (DEPADE) tablet 25 mg, 25 mg, Oral, Daily with breakfast, Roxann Goldsmith MD, 25 mg at 02/04/20 0947    melatonin tablet 3 mg, 3 mg, Oral, Nightly, Roxann Goldsmith MD, 3 mg at 02/03/20 2037    vitamin B-1 (THIAMINE) tablet 100 mg, 100 mg, Oral, Daily, Nina Saha MD, 100 mg at 68/54/85 4054    folic acid (FOLVITE) tablet 1 mg, 1 mg, Oral, Daily, Nina Saha MD, 1 mg at 02/04/20 0946    sodium chloride flush 0.9 % injection 10 mL, 10 mL, Intravenous, PRN, Kumar Farah

## 2020-02-04 NOTE — CARE COORDINATION
SW went to speak w/ pt at the bedside in regards to consult. At beginning of assessment, pt informed SW, \"I am sweating, wasn't yesterday, don't know if this is a sign, but now my whole body is sweating. \" SW asked if nursing was informed, pt stated he told someone who was going to tell his nurse. SW said after assessment would go straight to nurse and inform of this. No other complaints at this time from pt, just stated a few times that he is \"sweating all over. \"    Pt was accepting of etoh resources. SW provided AA website/listings for [place] area for everyday of the week. Pt would be able to find sponsor through those meetings. Pt stated he believes he just needs a rehab, either \"in-house or outpatient,\" then he thinks will not need AA afterwards. SW informed it is good to continue w/ meetings or to begin meetings even after completing a rehab. SW then provided a full list of etoh resources w/ inpatient and outpatient options, in the local area as well as outreached. Pt agreeable to participation in a program. SW informed pt would need to research or contact for admissions on his own; as SW cannot answer the assessment questions a facility would have on behalf of the pt. Understanding displayed. No further questions/needs/concerns at this time; other than sweating. SW went to inform pt's nurse, Jairon Padilla. Will continue to follow.     Electronically signed by Desiree Snow on 2/4/2020 at 11:40 AM

## 2020-02-04 NOTE — CARE COORDINATION
Given this pt's readmission risk score being greater than 10% (current 14%), consider the initiation of Home Care services. If you feel appropriate, please order at discharge. Thank you.     Electronically signed by Marilyn Toussaint on 2/4/2020 at 11:55 AM

## 2020-02-05 VITALS
BODY MASS INDEX: 23.85 KG/M2 | DIASTOLIC BLOOD PRESSURE: 88 MMHG | WEIGHT: 161 LBS | SYSTOLIC BLOOD PRESSURE: 141 MMHG | HEIGHT: 69 IN | HEART RATE: 90 BPM | RESPIRATION RATE: 18 BRPM | TEMPERATURE: 97.3 F | OXYGEN SATURATION: 96 %

## 2020-02-05 PROBLEM — N17.9 AKI (ACUTE KIDNEY INJURY) (HCC): Status: RESOLVED | Noted: 2019-03-14 | Resolved: 2020-02-05

## 2020-02-05 LAB
ANION GAP SERPL CALCULATED.3IONS-SCNC: 14 MMOL/L (ref 7–19)
BASOPHILS ABSOLUTE: 0.1 K/UL (ref 0–0.2)
BASOPHILS RELATIVE PERCENT: 1 % (ref 0–1)
BUN BLDV-MCNC: 17 MG/DL (ref 8–23)
CALCIUM SERPL-MCNC: 9.2 MG/DL (ref 8.8–10.2)
CHLORIDE BLD-SCNC: 101 MMOL/L (ref 98–111)
CO2: 23 MMOL/L (ref 22–29)
CREAT SERPL-MCNC: 1.2 MG/DL (ref 0.5–1.2)
EOSINOPHILS ABSOLUTE: 0.3 K/UL (ref 0–0.6)
EOSINOPHILS RELATIVE PERCENT: 5.7 % (ref 0–5)
GFR NON-AFRICAN AMERICAN: >60
GLUCOSE BLD-MCNC: 112 MG/DL (ref 74–109)
HCT VFR BLD CALC: 31.5 % (ref 42–52)
HEMOGLOBIN: 10.3 G/DL (ref 14–18)
IMMATURE GRANULOCYTES #: 0 K/UL
LYMPHOCYTES ABSOLUTE: 1.4 K/UL (ref 1.1–4.5)
LYMPHOCYTES RELATIVE PERCENT: 28.4 % (ref 20–40)
MCH RBC QN AUTO: 36.3 PG (ref 27–31)
MCHC RBC AUTO-ENTMCNC: 32.7 G/DL (ref 33–37)
MCV RBC AUTO: 110.9 FL (ref 80–94)
MONOCYTES ABSOLUTE: 0.3 K/UL (ref 0–0.9)
MONOCYTES RELATIVE PERCENT: 6.7 % (ref 0–10)
NEUTROPHILS ABSOLUTE: 2.8 K/UL (ref 1.5–7.5)
NEUTROPHILS RELATIVE PERCENT: 57.8 % (ref 50–65)
PDW BLD-RTO: 14.8 % (ref 11.5–14.5)
PLATELET # BLD: 191 K/UL (ref 130–400)
PMV BLD AUTO: 10.5 FL (ref 9.4–12.4)
POTASSIUM REFLEX MAGNESIUM: 4 MMOL/L (ref 3.5–5)
RBC # BLD: 2.84 M/UL (ref 4.7–6.1)
SODIUM BLD-SCNC: 138 MMOL/L (ref 136–145)
WBC # BLD: 4.9 K/UL (ref 4.8–10.8)

## 2020-02-05 PROCEDURE — 36415 COLL VENOUS BLD VENIPUNCTURE: CPT

## 2020-02-05 PROCEDURE — 85025 COMPLETE CBC W/AUTO DIFF WBC: CPT

## 2020-02-05 PROCEDURE — 6360000002 HC RX W HCPCS: Performed by: HOSPITALIST

## 2020-02-05 PROCEDURE — 6370000000 HC RX 637 (ALT 250 FOR IP): Performed by: PSYCHIATRY & NEUROLOGY

## 2020-02-05 PROCEDURE — 99233 SBSQ HOSP IP/OBS HIGH 50: CPT | Performed by: PSYCHIATRY & NEUROLOGY

## 2020-02-05 PROCEDURE — 6370000000 HC RX 637 (ALT 250 FOR IP): Performed by: HOSPITALIST

## 2020-02-05 PROCEDURE — 80048 BASIC METABOLIC PNL TOTAL CA: CPT

## 2020-02-05 RX ORDER — OMEPRAZOLE 20 MG/1
20 CAPSULE, DELAYED RELEASE ORAL DAILY
Qty: 30 CAPSULE | Refills: 0 | Status: SHIPPED | OUTPATIENT
Start: 2020-02-05 | End: 2020-04-30

## 2020-02-05 RX ORDER — NALTREXONE HYDROCHLORIDE 50 MG/1
25 TABLET, FILM COATED ORAL
Qty: 15 TABLET | Refills: 0 | Status: SHIPPED | OUTPATIENT
Start: 2020-02-06 | End: 2020-03-07

## 2020-02-05 RX ORDER — LANOLIN ALCOHOL/MO/W.PET/CERES
100 CREAM (GRAM) TOPICAL DAILY
Qty: 30 TABLET | Refills: 3 | Status: SHIPPED | OUTPATIENT
Start: 2020-02-06 | End: 2020-04-30 | Stop reason: ALTCHOICE

## 2020-02-05 RX ADMIN — CLOPIDOGREL BISULFATE 75 MG: 75 TABLET ORAL at 08:29

## 2020-02-05 RX ADMIN — HEPARIN SODIUM 5000 UNITS: 5000 INJECTION INTRAVENOUS; SUBCUTANEOUS at 06:05

## 2020-02-05 RX ADMIN — ASPIRIN 81 MG: 81 TABLET, COATED ORAL at 08:29

## 2020-02-05 RX ADMIN — CHLORDIAZEPOXIDE HYDROCHLORIDE 25 MG: 25 CAPSULE ORAL at 08:29

## 2020-02-05 RX ADMIN — PANTOPRAZOLE SODIUM 20 MG: 20 TABLET, DELAYED RELEASE ORAL at 06:05

## 2020-02-05 RX ADMIN — NALTREXONE HYDROCHLORIDE 25 MG: 50 TABLET, FILM COATED ORAL at 08:29

## 2020-02-05 RX ADMIN — LORAZEPAM 1 MG: 1 TABLET ORAL at 08:32

## 2020-02-05 RX ADMIN — Medication 100 MG: at 08:29

## 2020-02-05 RX ADMIN — FOLIC ACID 1 MG: 1 TABLET ORAL at 08:29

## 2020-02-05 RX ADMIN — ATORVASTATIN CALCIUM 40 MG: 40 TABLET, FILM COATED ORAL at 08:29

## 2020-02-05 NOTE — DISCHARGE SUMMARY
hours.  INR: No results for input(s): INR in the last 72 hours. ABGs:   Lab Results   Component Value Date    PHART 7.450 03/23/2016    PO2ART 88.0 03/23/2016    MCL1YSJ 29.0 03/23/2016     UA:  Recent Labs     02/03/20  0140   COLORU YELLOW   PHUR 5.0   CLARITYU Clear   SPECGRAV 1.013   LEUKOCYTESUR Negative   UROBILINOGEN 0.2   BILIRUBINUR Negative   BLOODU Negative   GLUCOSEU Negative         Culture Results:    No results for input(s): CXSURG in the last 720 hours. Blood Culture Recent: No results for input(s): BC in the last 720 hours. Cultures:   No results for input(s): CULTURE in the last 72 hours. No results for input(s): BC, Carmelita Ego in the last 72 hours. No results for input(s): CXSURG in the last 72 hours. No results for input(s): MG, PHOS in the last 72 hours. Recent Labs     02/02/20 2022   AST 23   ALT 9   BILITOT <0.2   ALKPHOS 69           Significant Diagnostic Studies:   No results found. Hospital Course:   Mr. Jonathan Guthrie, a 61-year-old male, history of EtOH abuse, as well as polysubstance abuse, presented to the ED on account of alcohol intoxication. Patient was admitted and managed for possible alcohol withdrawal.    Polysubstance abuse  ETOH abuse, monitored for withdrawal   U tox positive for benzodiazepine and cannabinoid upon presentation.  Continued CIWA Protocol   Larazepam PRN as per CIWA Score   Banana Bag (Thiamine, Folic Acid, Multivatamins)   Seizure Precautions   Patient counseled about substance use. · Seen by psych  · Recommend patient, to continue naltrexone, and restart Cymbalta. · Patient to follow-up with for visit behavioral health as needed upon discharge. LES   -Likely prerenal  -Baseline creatinine of 1.0  -Creatinine level on presentation 2.4  -Continued IV hydration  -Avoided hypotension and nephrotoxins  Creatinine now 1.2.               Physical Exam:  Vital Signs: BP (!) 141/88   Pulse 90   Temp 97.3 °F (36.3 °C) (Temporal)   Resp 18   Ht 5' 9\" (1.753 m)   Wt 161 lb (73 kg)   SpO2 96%   BMI 23.78 kg/m²   Physical Exam  Vitals signs and nursing note reviewed. Constitutional:       General: He is not in acute distress. Appearance: Normal appearance. He is not ill-appearing, toxic-appearing or diaphoretic. HENT:      Head: Normocephalic and atraumatic. Right Ear: External ear normal.      Left Ear: External ear normal.      Nose: Nose normal. No congestion or rhinorrhea. Mouth/Throat:      Mouth: Mucous membranes are moist.   Eyes:      General: No scleral icterus. Extraocular Movements: Extraocular movements intact. Conjunctiva/sclera: Conjunctivae normal.      Pupils: Pupils are equal, round, and reactive to light. Neck:      Musculoskeletal: Normal range of motion and neck supple. No neck rigidity or muscular tenderness. Vascular: No carotid bruit. Cardiovascular:      Rate and Rhythm: Normal rate and regular rhythm. Pulses: Normal pulses. Heart sounds: Normal heart sounds. No murmur. No friction rub. No gallop. Pulmonary:      Effort: Pulmonary effort is normal. No respiratory distress. Breath sounds: Normal breath sounds. No stridor. No wheezing, rhonchi or rales. Chest:      Chest wall: No tenderness. Abdominal:      General: Bowel sounds are normal. There is no distension. Palpations: Abdomen is soft. Tenderness: There is no abdominal tenderness. Musculoskeletal: Normal range of motion. General: No swelling or tenderness. Right lower leg: No edema. Left lower leg: No edema. Skin:     General: Skin is warm and dry. Capillary Refill: Capillary refill takes less than 2 seconds. Coloration: Skin is not jaundiced or pale. Findings: No bruising, erythema, lesion or rash. Neurological:      General: No focal deficit present. Mental Status: He is alert and oriented to person, place, and time.       Cranial Nerves: No cranial nerve deficit. Sensory: No sensory deficit. Motor: No weakness. Gait: Gait normal.   Psychiatric:         Mood and Affect: Mood normal.         Behavior: Behavior normal.         Thought Content: Thought content normal.         Judgment: Judgment normal.           Discharge Medications:       Fabian Madera   Home Medication Instructions JVO:[id number]    Printed on:02/05/20 1246   Medication Information                      albuterol sulfate  (90 BASE) MCG/ACT inhaler  Inhale 2 puffs into the lungs every 6 hours as needed for Wheezing             aspirin 81 MG EC tablet  Take 1 tablet by mouth daily             atorvastatin (LIPITOR) 40 MG tablet  Take 40 mg by mouth daily             clopidogrel (PLAVIX) 75 MG tablet  Take 1 tablet by mouth daily             diphenhydrAMINE (BENADRYL) 25 MG tablet  Take 25 mg by mouth every 6 hours as needed for Itching             DULoxetine (CYMBALTA) 30 MG extended release capsule  Take 30 mg by mouth daily             lisinopril (PRINIVIL;ZESTRIL) 20 MG tablet  Take 20 mg by mouth daily             naltrexone (DEPADE) 50 MG tablet  Take 0.5 tablets by mouth daily (with breakfast)             omeprazole (PRILOSEC) 20 MG delayed release capsule  Take 1 capsule by mouth daily             vitamin B-1 100 MG tablet  Take 1 tablet by mouth daily                 Discharge Instructions: Follow up with Iva Corcoran MD in 7 days. Take medications as directed. Resume activity as tolerated. Diet: DIET GENERAL;        DISCHARGE STATUS:    Condition: Good  Disposition: Patient is medically stable and will be discharged Home    Extended Emergency Contact Information  Primary Emergency Contact: Morena Erin Siddiquijeannette Phone: [phone]  Relation: Other  Secondary Emergency Contact: Governor Forth  Relation: Brother/Sister   needed?  No       Time Spent on discharge is more than 34 mins in the examination, evaluation, counseling and review of medications and discharge plan. Electronically signed by   Randy Pierce MD, MPH,   Internal Medicine Hospitalist   2/5/2020 12:41 PM      Thank you Vince Helm MD for the opportunity to be involved in this patient's care. If you have any questions or concerns please feel free to contact me at [phone]        EMR Dragon/Transcription disclaimer:   Much of this encounter note is an electronic transcription/translation of spoken language to printed text.  The electronic translation of spoken language may permit erroneous, or at times, nonsensical words or phrases to be inadvertently transcribed; although attempts have made to review the note for such errors, some may still exist.

## 2020-02-05 NOTE — PROGRESS NOTES
4.9 02/05/2020    HGB 10.3 (L) 02/05/2020    HCT 31.5 (L) 02/05/2020    .9 (H) 02/05/2020     02/05/2020      Lab Results   Component Value Date     02/05/2020    K 4.0 02/05/2020     02/05/2020    CO2 23 02/05/2020    BUN 17 02/05/2020    CREATININE 1.2 02/05/2020    GLUCOSE 112 (H) 02/05/2020    CALCIUM 9.2 02/05/2020    PROT 7.0 02/02/2020    LABALBU 4.2 02/02/2020    BILITOT <0.2 02/02/2020    ALKPHOS 69 02/02/2020    AST 23 02/02/2020    ALT 9 02/02/2020    LABGLOM >60 02/05/2020    GLOB 2.2 05/30/2016       Medications    Current Facility-Administered Medications:     sodium chloride flush 0.9 % injection 10 mL, 10 mL, Intravenous, 2 times per day, Minus MD Janelle, 10 mL at 02/04/20 0946    polyethylene glycol (GLYCOLAX) packet 17 g, 17 g, Oral, Daily PRN, Minus MD Janelle    diphenhydrAMINE (BENADRYL) tablet 25 mg, 25 mg, Oral, Q6H PRN, Minus MD Janelle    acetaminophen (TYLENOL) tablet 650 mg, 650 mg, Oral, Q4H PRN, Minus MD Janelle, 650 mg at 02/03/20 2315    lactated ringers infusion, , Intravenous, Continuous, Zaid Malave MD, Last Rate: 125 mL/hr at 02/03/20 2037    chlordiazePOXIDE (LIBRIUM) capsule 25 mg, 25 mg, Oral, BID, Zaid Malave MD, 25 mg at 02/05/20 4353    naltrexone (DEPADE) tablet 25 mg, 25 mg, Oral, Daily with breakfast, Alethea Sicard, MD, 25 mg at 02/05/20 8809    melatonin tablet 3 mg, 3 mg, Oral, Nightly, Alethea Sicard, MD, 3 mg at 02/04/20 2127    vitamin B-1 (THIAMINE) tablet 100 mg, 100 mg, Oral, Daily, Minus MD Janelle, 100 mg at 66/29/68 6474    folic acid (FOLVITE) tablet 1 mg, 1 mg, Oral, Daily, Minus MD Janelle, 1 mg at 02/05/20 4521    sodium chloride flush 0.9 % injection 10 mL, 10 mL, Intravenous, PRN, Minus MD Janelle, 10 mL at 02/03/20 0825    LORazepam (ATIVAN) tablet 1 mg, 1 mg, Oral, Q1H PRN, 1 mg at 02/05/20 0832 **OR** LORazepam (ATIVAN) injection 1 mg, 1 mg, Intravenous, Q1H PRN **OR** LORazepam (ATIVAN) tablet

## 2020-04-30 ENCOUNTER — APPOINTMENT (OUTPATIENT)
Dept: CT IMAGING | Age: 65
End: 2020-04-30
Payer: COMMERCIAL

## 2020-04-30 ENCOUNTER — HOSPITAL ENCOUNTER (EMERGENCY)
Age: 65
Discharge: HOME | End: 2020-04-30
Attending: EMERGENCY MEDICINE
Payer: COMMERCIAL

## 2020-04-30 ENCOUNTER — APPOINTMENT (OUTPATIENT)
Dept: GENERAL RADIOLOGY | Age: 65
End: 2020-04-30
Payer: COMMERCIAL

## 2020-04-30 VITALS
SYSTOLIC BLOOD PRESSURE: 147 MMHG | RESPIRATION RATE: 17 BRPM | WEIGHT: 161 LBS | HEART RATE: 104 BPM | BODY MASS INDEX: 23.85 KG/M2 | HEIGHT: 69 IN | OXYGEN SATURATION: 95 % | DIASTOLIC BLOOD PRESSURE: 94 MMHG | TEMPERATURE: 97.8 F

## 2020-04-30 LAB
ALBUMIN SERPL-MCNC: 4.2 G/DL (ref 3.5–5.2)
ALP BLD-CCNC: 75 U/L (ref 40–130)
ALT SERPL-CCNC: 14 U/L (ref 5–41)
AMPHETAMINE SCREEN, URINE: NEGATIVE
ANION GAP SERPL CALCULATED.3IONS-SCNC: 16 MMOL/L (ref 7–19)
APTT: 27.5 SEC (ref 26–36.2)
AST SERPL-CCNC: 34 U/L (ref 5–40)
BARBITURATE SCREEN URINE: NEGATIVE
BASOPHILS ABSOLUTE: 0.2 K/UL (ref 0–0.2)
BASOPHILS RELATIVE PERCENT: 2.3 % (ref 0–1)
BENZODIAZEPINE SCREEN, URINE: POSITIVE
BILIRUB SERPL-MCNC: <0.2 MG/DL (ref 0.2–1.2)
BUN BLDV-MCNC: 16 MG/DL (ref 8–23)
CALCIUM SERPL-MCNC: 9.5 MG/DL (ref 8.8–10.2)
CANNABINOID SCREEN URINE: NEGATIVE
CHLORIDE BLD-SCNC: 101 MMOL/L (ref 98–111)
CO2: 23 MMOL/L (ref 22–29)
COCAINE METABOLITE SCREEN URINE: NEGATIVE
CREAT SERPL-MCNC: 1.1 MG/DL (ref 0.5–1.2)
EKG P AXIS: 61 DEGREES
EKG P-R INTERVAL: 178 MS
EKG Q-T INTERVAL: 298 MS
EKG QRS DURATION: 86 MS
EKG QTC CALCULATION (BAZETT): 399 MS
EKG T AXIS: 38 DEGREES
EOSINOPHILS ABSOLUTE: 0.2 K/UL (ref 0–0.6)
EOSINOPHILS RELATIVE PERCENT: 3.2 % (ref 0–5)
ETHANOL: 223 MG/DL (ref 0–0.08)
GFR NON-AFRICAN AMERICAN: >60
GLUCOSE BLD-MCNC: 101 MG/DL (ref 74–109)
HCT VFR BLD CALC: 34.9 % (ref 42–52)
HEMOGLOBIN: 11.9 G/DL (ref 14–18)
IMMATURE GRANULOCYTES #: 0 K/UL
INR BLD: 0.97 (ref 0.88–1.18)
LYMPHOCYTES ABSOLUTE: 1.6 K/UL (ref 1.1–4.5)
LYMPHOCYTES RELATIVE PERCENT: 22.1 % (ref 20–40)
Lab: ABNORMAL
MCH RBC QN AUTO: 38.9 PG (ref 27–31)
MCHC RBC AUTO-ENTMCNC: 34.1 G/DL (ref 33–37)
MCV RBC AUTO: 114.1 FL (ref 80–94)
MONOCYTES ABSOLUTE: 0.6 K/UL (ref 0–0.9)
MONOCYTES RELATIVE PERCENT: 7.9 % (ref 0–10)
NEUTROPHILS ABSOLUTE: 4.6 K/UL (ref 1.5–7.5)
NEUTROPHILS RELATIVE PERCENT: 64.2 % (ref 50–65)
OPIATE SCREEN URINE: NEGATIVE
PDW BLD-RTO: 15.3 % (ref 11.5–14.5)
PLATELET # BLD: 249 K/UL (ref 130–400)
PMV BLD AUTO: 9.8 FL (ref 9.4–12.4)
POTASSIUM SERPL-SCNC: 4.3 MMOL/L (ref 3.5–5)
PROTHROMBIN TIME: 12.8 SEC (ref 12–14.6)
RBC # BLD: 3.06 M/UL (ref 4.7–6.1)
REASON FOR REJECTION: NORMAL
REJECTED TEST: NORMAL
SODIUM BLD-SCNC: 140 MMOL/L (ref 136–145)
TOTAL PROTEIN: 7 G/DL (ref 6.6–8.7)
WBC # BLD: 7.1 K/UL (ref 4.8–10.8)

## 2020-04-30 PROCEDURE — 2580000003 HC RX 258: Performed by: EMERGENCY MEDICINE

## 2020-04-30 PROCEDURE — 36415 COLL VENOUS BLD VENIPUNCTURE: CPT

## 2020-04-30 PROCEDURE — 70450 CT HEAD/BRAIN W/O DYE: CPT

## 2020-04-30 PROCEDURE — 96374 THER/PROPH/DIAG INJ IV PUSH: CPT

## 2020-04-30 PROCEDURE — 85610 PROTHROMBIN TIME: CPT

## 2020-04-30 PROCEDURE — 99284 EMERGENCY DEPT VISIT MOD MDM: CPT

## 2020-04-30 PROCEDURE — 85025 COMPLETE CBC W/AUTO DIFF WBC: CPT

## 2020-04-30 PROCEDURE — 71045 X-RAY EXAM CHEST 1 VIEW: CPT

## 2020-04-30 PROCEDURE — G0480 DRUG TEST DEF 1-7 CLASSES: HCPCS

## 2020-04-30 PROCEDURE — 80053 COMPREHEN METABOLIC PANEL: CPT

## 2020-04-30 PROCEDURE — 6360000002 HC RX W HCPCS: Performed by: EMERGENCY MEDICINE

## 2020-04-30 PROCEDURE — 85730 THROMBOPLASTIN TIME PARTIAL: CPT

## 2020-04-30 PROCEDURE — 80307 DRUG TEST PRSMV CHEM ANLYZR: CPT

## 2020-04-30 PROCEDURE — 93005 ELECTROCARDIOGRAM TRACING: CPT | Performed by: EMERGENCY MEDICINE

## 2020-04-30 RX ORDER — 0.9 % SODIUM CHLORIDE 0.9 %
1000 INTRAVENOUS SOLUTION INTRAVENOUS ONCE
Status: COMPLETED | OUTPATIENT
Start: 2020-04-30 | End: 2020-04-30

## 2020-04-30 RX ORDER — LORAZEPAM 2 MG/ML
1 INJECTION INTRAMUSCULAR ONCE
Status: COMPLETED | OUTPATIENT
Start: 2020-04-30 | End: 2020-04-30

## 2020-04-30 RX ADMIN — LORAZEPAM 1 MG: 2 INJECTION INTRAMUSCULAR; INTRAVENOUS at 08:40

## 2020-04-30 RX ADMIN — SODIUM CHLORIDE 1000 ML: 9 INJECTION, SOLUTION INTRAVENOUS at 08:40

## 2020-04-30 ASSESSMENT — ENCOUNTER SYMPTOMS
VOMITING: 0
COUGH: 0
BACK PAIN: 0
ABDOMINAL PAIN: 0
DIARRHEA: 0
SORE THROAT: 0
RHINORRHEA: 0
NAUSEA: 0
SHORTNESS OF BREATH: 0

## 2020-04-30 NOTE — ED PROVIDER NOTES
[facility] EMERGENCY DEPT  eMERGENCY dEPARTMENT eNCOUnter      Pt Name: Doris Hauser  MRN: [id number]  [place] 1955  Date of evaluation: 4/30/2020  Provider: Jasvir Rollins MD    [address] [place] [address]       Chief Complaint   Patient presents with    Alcohol Intoxication    Other     felt like  bolt of lightening in head 3 days         HISTORY OF PRESENT ILLNESS   (Location/Symptom, Timing/Onset,Context/Setting, Quality, Duration, Modifying Factors, Severity)  Note limiting factors. Doris Hauser is a 60 y.o. male who presents to the emergency department for alcohol intoxication and headache. Patient tells me 2 or 3 days ago he woke up in the middle night and \"felt like a lightening bolt hit him in the head\". He drinks approximately 1 pint of vodka daily since he was born he tells me. His last drink was approximately 6 PM yesterday. He does admit to a fall but unsure when this occurred as well. Tells me has a prior history of stroke 2 years ago but his only chronic deficit is some vague paresthesias to the dorsum of both feet. Denies any chest pain or shortness of breath. He has not noticed any speech changes or focal weakness. He complains of a frontal headache only. HPI    NursingNotes were reviewed. REVIEW OF SYSTEMS    (2-9 systems for level 4, 10 or more for level 5)     Review of Systems   Constitutional: Negative for chills and fever. HENT: Negative for rhinorrhea and sore throat. Eyes: Negative for visual disturbance. Respiratory: Negative for cough and shortness of breath. Cardiovascular: Negative for chest pain and leg swelling. Gastrointestinal: Negative for abdominal pain, diarrhea, nausea and vomiting. Genitourinary: Negative for dysuria and frequency. Musculoskeletal: Negative for back pain and neck pain. Neurological: Positive for headaches. Negative for dizziness, seizures, syncope, facial asymmetry, speech difficulty, weakness and numbness.    All other systems reviewed and well-developed. He is not ill-appearing, toxic-appearing or diaphoretic. Comments: Unkempt appearance   HENT:      Head: Normocephalic and atraumatic. Nose: Nose normal.      Mouth/Throat:      Mouth: Mucous membranes are moist.   Eyes:      Conjunctiva/sclera: Conjunctivae normal.   Neck:      Musculoskeletal: Normal range of motion and neck supple. Trachea: No tracheal deviation. Cardiovascular:      Rate and Rhythm: Normal rate and regular rhythm. Heart sounds: Normal heart sounds. No murmur. Pulmonary:      Breath sounds: Normal breath sounds. No wheezing or rales. Abdominal:      Palpations: Abdomen is soft. There is no mass. Tenderness: There is no abdominal tenderness. Musculoskeletal: Normal range of motion. Right lower leg: No edema. Left lower leg: No edema. Skin:     General: Skin is warm and dry. Neurological:      General: No focal deficit present. Mental Status: He is alert and oriented to person, place, and time. GCS: GCS eye subscore is 4. GCS verbal subscore is 5. GCS motor subscore is 6. Cranial Nerves: No cranial nerve deficit, dysarthria or facial asymmetry. Sensory: Sensation is intact. Motor: Motor function is intact. No tremor or abnormal muscle tone. Coordination: Coordination normal. Finger-Nose-Finger Test normal.   Psychiatric:         Attention and Perception: Attention normal.         Behavior: Behavior normal. Behavior is cooperative. Thought Content: Thought content does not include homicidal or suicidal ideation.          DIAGNOSTIC RESULTS     EKG: All EKG's areinterpreted by the Emergency Department Physician who either signs or Co-signs this chart in the absence of a cardiologist.    118 sinus tachycardia no ST changes nondiagnostic EKG    RADIOLOGY:  Non-plain film images such as CT, Ultrasound and MRI are read by the radiologist. Plain radiographic images are visualized and preliminarily complication ([facility] [address]    2.  Nonintractable headache, unspecified chronicity pattern, unspecified headache type          DISPOSITION/PLAN   DISPOSITION        PATIENT REFERRED TO:  Ceci Diamond MD  [address] [phone]    Schedule an appointment as soon as possible for a visit on 5/4/2020        DISCHARGE MEDICATIONS:  New Prescriptions    No medications on file          (Please note that portions of this note were completed with a voice recognition program.  Efforts were made to edit thedictations but occasionally words are mis-transcribed.)    Maria E Clayton MD (electronically signed)  Attending Emergency Physician        Erika Gleason MD  04/30/20 [id number]       Erika Gleason MD  04/30/20 1002

## 2020-05-01 ENCOUNTER — HOSPITAL ENCOUNTER (EMERGENCY)
Age: 65
Discharge: HOME | End: 2020-05-01
Attending: EMERGENCY MEDICINE
Payer: COMMERCIAL

## 2020-05-01 ENCOUNTER — CARE COORDINATION (OUTPATIENT)
Dept: CARE COORDINATION | Age: 65
End: 2020-05-01

## 2020-05-01 ENCOUNTER — APPOINTMENT (OUTPATIENT)
Dept: CT IMAGING | Age: 65
End: 2020-05-01
Payer: COMMERCIAL

## 2020-05-01 VITALS
RESPIRATION RATE: 20 BRPM | BODY MASS INDEX: 24.37 KG/M2 | HEART RATE: 103 BPM | SYSTOLIC BLOOD PRESSURE: 132 MMHG | DIASTOLIC BLOOD PRESSURE: 85 MMHG | OXYGEN SATURATION: 94 % | TEMPERATURE: 97.9 F | WEIGHT: 165 LBS

## 2020-05-01 LAB
ALBUMIN SERPL-MCNC: 4.1 G/DL (ref 3.5–5.2)
ALP BLD-CCNC: 72 U/L (ref 40–130)
ALT SERPL-CCNC: 13 U/L (ref 5–41)
ANION GAP SERPL CALCULATED.3IONS-SCNC: 17 MMOL/L (ref 7–19)
AST SERPL-CCNC: 30 U/L (ref 5–40)
BASOPHILS ABSOLUTE: 0.2 K/UL (ref 0–0.2)
BASOPHILS RELATIVE PERCENT: 2.5 % (ref 0–1)
BILIRUB SERPL-MCNC: 0.3 MG/DL (ref 0.2–1.2)
BUN BLDV-MCNC: 11 MG/DL (ref 8–23)
CALCIUM SERPL-MCNC: 8.7 MG/DL (ref 8.8–10.2)
CHLORIDE BLD-SCNC: 104 MMOL/L (ref 98–111)
CO2: 18 MMOL/L (ref 22–29)
CREAT SERPL-MCNC: 1 MG/DL (ref 0.5–1.2)
EOSINOPHILS ABSOLUTE: 0.3 K/UL (ref 0–0.6)
EOSINOPHILS RELATIVE PERCENT: 4.7 % (ref 0–5)
GFR NON-AFRICAN AMERICAN: >60
GLUCOSE BLD-MCNC: 106 MG/DL (ref 74–109)
HCT VFR BLD CALC: 33.2 % (ref 42–52)
HEMOGLOBIN: 11.1 G/DL (ref 14–18)
IMMATURE GRANULOCYTES #: 0 K/UL
LIPASE: 38 U/L (ref 13–60)
LYMPHOCYTES ABSOLUTE: 1.9 K/UL (ref 1.1–4.5)
LYMPHOCYTES RELATIVE PERCENT: 29.7 % (ref 20–40)
MCH RBC QN AUTO: 38.1 PG (ref 27–31)
MCHC RBC AUTO-ENTMCNC: 33.4 G/DL (ref 33–37)
MCV RBC AUTO: 114.1 FL (ref 80–94)
MONOCYTES ABSOLUTE: 0.5 K/UL (ref 0–0.9)
MONOCYTES RELATIVE PERCENT: 7.9 % (ref 0–10)
NEUTROPHILS ABSOLUTE: 3.5 K/UL (ref 1.5–7.5)
NEUTROPHILS RELATIVE PERCENT: 54.7 % (ref 50–65)
PDW BLD-RTO: 15.1 % (ref 11.5–14.5)
PLATELET # BLD: 241 K/UL (ref 130–400)
PMV BLD AUTO: 9.4 FL (ref 9.4–12.4)
POTASSIUM REFLEX MAGNESIUM: 4 MMOL/L (ref 3.5–5)
RBC # BLD: 2.91 M/UL (ref 4.7–6.1)
SODIUM BLD-SCNC: 139 MMOL/L (ref 136–145)
TOTAL PROTEIN: 6.8 G/DL (ref 6.6–8.7)
WBC # BLD: 6.3 K/UL (ref 4.8–10.8)

## 2020-05-01 PROCEDURE — 36415 COLL VENOUS BLD VENIPUNCTURE: CPT

## 2020-05-01 PROCEDURE — 83690 ASSAY OF LIPASE: CPT

## 2020-05-01 PROCEDURE — 85025 COMPLETE CBC W/AUTO DIFF WBC: CPT

## 2020-05-01 PROCEDURE — 99285 EMERGENCY DEPT VISIT HI MDM: CPT

## 2020-05-01 PROCEDURE — 70450 CT HEAD/BRAIN W/O DYE: CPT

## 2020-05-01 PROCEDURE — 80053 COMPREHEN METABOLIC PANEL: CPT

## 2020-05-01 PROCEDURE — 93005 ELECTROCARDIOGRAM TRACING: CPT | Performed by: EMERGENCY MEDICINE

## 2020-05-01 PROCEDURE — 99999 PR OFFICE/OUTPT VISIT,PROCEDURE ONLY: CPT | Performed by: EMERGENCY MEDICINE

## 2020-05-01 ASSESSMENT — ENCOUNTER SYMPTOMS
COUGH: 0
EYE REDNESS: 0
EYE PAIN: 0
ABDOMINAL PAIN: 1
VOMITING: 0
RHINORRHEA: 0
SHORTNESS OF BREATH: 0
NAUSEA: 1
VOICE CHANGE: 0
DIARRHEA: 0

## 2020-05-01 NOTE — ED NOTES
PT leaving [facility] N[facility] GASPAR. PT signing [facility] papers.      Valeri Acosta, RN  05/01/20 [address] Remy, RN  05/01/20 7282

## 2020-05-01 NOTE — ED PROVIDER NOTES
[facility] EMERGENCY DEPT  EMERGENCY DEPARTMENT ENCOUNTER      Pt Name: Nataly Chavez  MRN: [id number]  [place] 1955  Date of evaluation: 5/1/2020  Provider: Jaylan Moody MD    CHIEF COMPLAINT       Chief Complaint   Patient presents with    Alcohol Intoxication    Fatigue         HISTORY OF PRESENT ILLNESS   (Location/Symptom, Timing/Onset,Context/Setting, Quality, Duration, Modifying Factors, Severity)  Note limiting factors. Nataly Chavez is a 60 y.o. male who presents to the emergency department with several complaints. States he is having muscle cramps in his stomach as well as \"brain rushes\" intermittently. Patient has a difficult time elaborating on his symptoms but states the rashes in his head come intermittently. Says it is not really a pain but just a flushing feeling suddenly and transiently. .  Was seen here yesterday and states symptoms are same as what was going on then. States he feels like something is wrong but does not know what. Patient admits to drinking today and states that he drinks a lot because his doctor will not give him any medications for pain or anxiety. States he did not have the symptoms present this morning before he started drinking but after he woke up and ate something he started drinking and then developed the symptoms which is similar to what happened yesterday. States he is sure that this is not related to his alcohol use though because he has been drinking alcohol for years and knows how he normally reacts to alcohol and this does not seem right. Patient does have a history of previous stroke. Denies any worsening of weakness or other neurologic symptoms. HPI    NursingNotes were reviewed. REVIEW OF SYSTEMS    (2-9 systems for level 4, 10 or more for level 5)     Review of Systems   Constitutional: Negative for fatigue and fever. HENT: Negative for congestion, rhinorrhea and voice change. Eyes: Negative for pain and redness.    Respiratory: Negative for cough and shortness of breath. Cardiovascular: Negative for chest pain. Gastrointestinal: Positive for abdominal pain and nausea. Negative for diarrhea and vomiting. Endocrine: Negative. Genitourinary: Negative. Musculoskeletal: Negative for arthralgias and gait problem. Skin: Negative for rash and wound. Neurological: Negative for weakness and headaches. Hematological: Negative. Psychiatric/Behavioral: Negative. All other systems reviewed and are negative. A complete review of systems was performed and is negative except as noted above in the HPI.        PAST MEDICAL HISTORY     Past Medical History:   Diagnosis Date    Alcohol abuse     Lonliness    Cerebellar stroke, acute ([facility] [address] 02/12/2016    Left    COPD (chronic obstructive pulmonary disease) (HCC)     Diagnosed in 2000    Degenerative disc disease at L5-S1 level     GERD (gastroesophageal reflux disease)     Duodenal ulcer    Hyperlipidemia     Hypertension     Iron (Fe) deficiency anemia     Pneumonia     COPD    Renal insufficiency     chronic kidney disease    Spinal stenosis     cervical    Ulcer          SURGICAL HISTORY       Past Surgical History:   Procedure Laterality Date    COLONOSCOPY      Polyps - benign 2016    ENDOSCOPY, COLON, DIAGNOSTIC      Duodenal ulcer 1997    HERNIA REPAIR      Umbillical hernia         CURRENT MEDICATIONS       Discharge Medication List as of 5/1/2020  9:06 PM      CONTINUE these medications which have NOT CHANGED    Details   fluticasone-salmeterol (ADVAIR) 250-50 MCG/DOSE AEPB Inhale 1 puff into the lungs every 12 hoursHistorical Med      omeprazole (PRILOSEC) 20 MG delayed release capsule Take 1 capsule by mouth daily, Disp-30 capsule, R-0Print      diphenhydrAMINE (BENADRYL) 25 MG tablet Take 25 mg by mouth every 6 hours as needed for ItchingHistorical Med      DULoxetine (CYMBALTA) 30 MG extended release capsule Take 30 mg by mouth dailyHistorical Med or organizations: None     Relationship status: None    Intimate partner violence     Fear of current or ex partner: None     Emotionally abused: None     Physically abused: None     Forced sexual activity: None   Other Topics Concern    None   Social History Narrative    None       SCREENINGS    [place] Coma Scale  Eye Opening: Spontaneous  Best Verbal Response: Oriented  Best Motor Response: Obeys commands  [place] Coma Scale Score: 15        PHYSICAL EXAM    (up to 7 for level 4, 8 or more for level 5)     ED Triage Vitals [05/01/20 1453]   BP Temp Temp Source Pulse Resp SpO2 Height Weight   (!) 138/101 97.9 °F (36.6 °C) Oral 107 16 96 % -- 165 lb (74.8 kg)       Physical Exam  Vitals signs and nursing note reviewed. Constitutional:       General: He is not in acute distress. Appearance: He is well-developed. He is not diaphoretic. HENT:      Head: Normocephalic and atraumatic. Eyes:      General: No scleral icterus. Neck:      Vascular: No JVD. Cardiovascular:      Rate and Rhythm: Normal rate and regular rhythm. Pulses:           Radial pulses are 2+ on the right side and 2+ on the left side. Dorsalis pedis pulses are 2+ on the right side and 2+ on the left side. Heart sounds: Normal heart sounds. No murmur. No friction rub. No gallop. Pulmonary:      Effort: Pulmonary effort is normal. No accessory muscle usage or respiratory distress. Breath sounds: Normal breath sounds. No stridor. No decreased breath sounds, wheezing, rhonchi or rales. Chest:      Chest wall: No tenderness. Abdominal:      General: There is no distension. Palpations: Abdomen is soft. Tenderness: There is no abdominal tenderness. There is no guarding or rebound. Musculoskeletal: Normal range of motion. General: No deformity. Right lower leg: No edema. Left lower leg: No edema. Skin:     General: Skin is warm and dry. Coloration: Skin is not pale.       Findings:

## 2020-05-01 NOTE — CARE COORDINATION
Patient contacted regarding recent discharge and COVID-19 risk   Care Transition Nurse/ Ambulatory Care Manager contacted the patient by telephone to perform post discharge assessment. Verified name and  with patient as identifiers. Patient has following risk factors of: no known risk factors. CTN/ACM reviewed discharge instructions, medical action plan and red flags related to discharge diagnosis. Reviewed and educated them on any new and changed medications related to discharge diagnosis. Advised obtaining a 90-day supply of all daily and as-needed medications. Education provided regarding infection prevention, and signs and symptoms of COVID-19 and when to seek medical attention with patient who verbalized understanding. Discussed exposure protocols and quarantine from [address] you at higher risk for severe illness  and given an opportunity for questions and concerns. The patient agrees to contact the COVID-19 hotline [phone] or PCP office for questions related to their healthcare. CTN/ACM provided contact information for future reference. From CDC: Are you at higher risk for severe illness?  Wash your hands often.  Avoid close contact (6 feet, which is about two arm lengths) with people who are sick.  Put distance between yourself and other people if COVID-19 is spreading in your community.  Clean and disinfect frequently touched surfaces.  Avoid all cruise travel and non-essential air travel.  Call your healthcare professional if you have concerns about COVID-19 and your underlying condition or if you are sick. For more information on steps you can take to protect yourself, see CDC's How to [id number] [place] [address] for follow-up call in 5-7 days based on severity of symptoms and risk factors. 01:12 pm-  Patient states that he continues with headache. Denies fever, SOB, or cough.   Patient states that he is tired of his neck and back pain and drinks alcohol

## 2020-05-01 NOTE — ED NOTES
PT calling out stating he is going to leave bc he is not getting any help and this hospital is ran by a bunch of kindergarteners. PT stated, \"This place is a piece of shit. \"     Cassi Lilly RN  05/01/20 1706

## 2020-05-01 NOTE — ED NOTES
Bed: 11  Expected date:   Expected time:   Means of arrival:   Comments:  Shameka Pascual RN  05/01/20 0276

## 2020-05-04 LAB
EKG P AXIS: 67 DEGREES
EKG P-R INTERVAL: 196 MS
EKG Q-T INTERVAL: 340 MS
EKG QRS DURATION: 84 MS
EKG QTC CALCULATION (BAZETT): 403 MS
EKG T AXIS: 42 DEGREES

## 2020-05-04 PROCEDURE — 93010 ELECTROCARDIOGRAM REPORT: CPT | Performed by: INTERNAL MEDICINE

## 2022-06-12 ENCOUNTER — APPOINTMENT (OUTPATIENT)
Dept: CT IMAGING | Age: 67
DRG: 897 | End: 2022-06-12
Payer: MEDICAID

## 2022-06-12 ENCOUNTER — HOSPITAL ENCOUNTER (INPATIENT)
Age: 67
LOS: 8 days | Discharge: HOME | DRG: 897 | End: 2022-06-20
Attending: EMERGENCY MEDICINE | Admitting: INTERNAL MEDICINE
Payer: MEDICAID

## 2022-06-12 ENCOUNTER — APPOINTMENT (OUTPATIENT)
Dept: GENERAL RADIOLOGY | Age: 67
DRG: 897 | End: 2022-06-12
Payer: MEDICAID

## 2022-06-12 DIAGNOSIS — N28.9 ACUTE RENAL INSUFFICIENCY: ICD-10-CM

## 2022-06-12 DIAGNOSIS — R56.9 OBSERVED SEIZURE-LIKE ACTIVITY (HCC): ICD-10-CM

## 2022-06-12 DIAGNOSIS — E87.20 METABOLIC ACIDOSIS: Primary | ICD-10-CM

## 2022-06-12 DIAGNOSIS — F10.929 ACUTE ALCOHOLIC INTOXICATION WITH COMPLICATION (HCC): ICD-10-CM

## 2022-06-12 DIAGNOSIS — E87.20 LACTIC ACID ACIDOSIS: ICD-10-CM

## 2022-06-12 PROBLEM — I95.9 HYPOTENSION: Status: ACTIVE | Noted: 2022-06-12

## 2022-06-12 LAB
ACETAMINOPHEN LEVEL: <15 UG/ML
ALBUMIN SERPL-MCNC: 3.5 G/DL (ref 3.5–5.2)
ALLENS TEST: ABNORMAL
ALP BLD-CCNC: 77 U/L (ref 40–130)
ALT SERPL-CCNC: 29 U/L (ref 5–41)
AMPHETAMINE SCREEN, URINE: NEGATIVE
ANION GAP SERPL CALCULATED.3IONS-SCNC: 24 MMOL/L (ref 7–19)
APTT: 30.9 SEC (ref 26–36.2)
AST SERPL-CCNC: 66 U/L (ref 5–40)
BACTERIA: NEGATIVE /HPF
BARBITURATE SCREEN URINE: NEGATIVE
BASE EXCESS ARTERIAL: -16.2 MMOL/L (ref -2–2)
BASOPHILS ABSOLUTE: 0.1 K/UL (ref 0–0.2)
BASOPHILS RELATIVE PERCENT: 1.1 % (ref 0–1)
BENZODIAZEPINE SCREEN, URINE: NEGATIVE
BILIRUB SERPL-MCNC: 0.6 MG/DL (ref 0.2–1.2)
BILIRUBIN URINE: NEGATIVE
BLOOD, URINE: ABNORMAL
BUN BLDV-MCNC: 25 MG/DL (ref 8–23)
CALCIUM SERPL-MCNC: 8.9 MG/DL (ref 8.8–10.2)
CANNABINOID SCREEN URINE: POSITIVE
CARBOXYHEMOGLOBIN ARTERIAL: 1.9 % (ref 0–5)
CHLORIDE BLD-SCNC: 95 MMOL/L (ref 98–111)
CLARITY: CLEAR
CO2: 9 MMOL/L (ref 22–29)
COCAINE METABOLITE SCREEN URINE: NEGATIVE
COLOR: YELLOW
CREAT SERPL-MCNC: 1.8 MG/DL (ref 0.5–1.2)
CRYSTALS, UA: ABNORMAL /HPF
EOSINOPHILS ABSOLUTE: 0.1 K/UL (ref 0–0.6)
EOSINOPHILS RELATIVE PERCENT: 0.9 % (ref 0–5)
EPITHELIAL CELLS, UA: 1 /HPF (ref 0–5)
ETHANOL: 224 MG/DL (ref 0–0.08)
FIO2: 21 %
GFR AFRICAN AMERICAN: 46
GFR NON-AFRICAN AMERICAN: 38
GLUCOSE BLD-MCNC: 167 MG/DL (ref 70–99)
GLUCOSE BLD-MCNC: 178 MG/DL (ref 74–109)
GLUCOSE URINE: NEGATIVE MG/DL
HCO3 ARTERIAL: 7.7 MMOL/L (ref 22–26)
HCT VFR BLD CALC: 44.6 % (ref 42–52)
HEMOGLOBIN, ART, EXTENDED: 14.3 G/DL (ref 14–18)
HEMOGLOBIN: 14.4 G/DL (ref 14–18)
HYALINE CASTS: 3 /HPF (ref 0–8)
IMMATURE GRANULOCYTES #: 0.1 K/UL
INR BLD: 1.07 (ref 0.88–1.18)
KETONES, URINE: ABNORMAL MG/DL
LACTIC ACID: 3.3 MMOL/L (ref 0.5–1.9)
LACTIC ACID: 5.3 MMOL/L (ref 0.5–1.9)
LEUKOCYTE ESTERASE, URINE: NEGATIVE
LYMPHOCYTES ABSOLUTE: 1.7 K/UL (ref 1.1–4.5)
LYMPHOCYTES RELATIVE PERCENT: 16.3 % (ref 20–40)
Lab: ABNORMAL
MCH RBC QN AUTO: 34.9 PG (ref 27–31)
MCHC RBC AUTO-ENTMCNC: 32.3 G/DL (ref 33–37)
MCV RBC AUTO: 108 FL (ref 80–94)
METHEMOGLOBIN ARTERIAL: 1.3 %
MONOCYTES ABSOLUTE: 0.6 K/UL (ref 0–0.9)
MONOCYTES RELATIVE PERCENT: 6.1 % (ref 0–10)
NEUTROPHILS ABSOLUTE: 7.6 K/UL (ref 1.5–7.5)
NEUTROPHILS RELATIVE PERCENT: 74.8 % (ref 50–65)
NITRITE, URINE: NEGATIVE
O2 CONTENT ARTERIAL: 18.3 ML/DL
O2 SAT, ARTERIAL: 90.8 %
O2 THERAPY: ABNORMAL
OPIATE SCREEN URINE: NEGATIVE
PCO2 ARTERIAL: 16 MMHG (ref 35–45)
PDW BLD-RTO: 14.4 % (ref 11.5–14.5)
PERFORMED ON: ABNORMAL
PH ARTERIAL: 7.29 (ref 7.35–7.45)
PH UA: 5.5 (ref 5–8)
PLATELET # BLD: 181 K/UL (ref 130–400)
PMV BLD AUTO: 10.8 FL (ref 9.4–12.4)
PO2 ARTERIAL: 71 MMHG (ref 80–100)
POTASSIUM REFLEX MAGNESIUM: 4.4 MMOL/L (ref 3.5–5)
POTASSIUM, WHOLE BLOOD: 3.7
PRO-BNP: ABNORMAL PG/ML (ref 0–900)
PROTEIN UA: 300 MG/DL
PROTHROMBIN TIME: 13.9 SEC (ref 12–14.6)
RBC # BLD: 4.13 M/UL (ref 4.7–6.1)
RBC UA: 3 /HPF (ref 0–4)
SALICYLATE, SERUM: <3 MG/DL (ref 3–10)
SARS-COV-2, NAAT: NOT DETECTED
SODIUM BLD-SCNC: 128 MMOL/L (ref 136–145)
SPECIFIC GRAVITY UA: 1.02 (ref 1–1.03)
TOTAL CK: 243 U/L (ref 39–308)
TOTAL PROTEIN: 6.9 G/DL (ref 6.6–8.7)
TROPONIN: 0.05 NG/ML (ref 0–0.03)
TROPONIN: <0.01 NG/ML (ref 0–0.03)
UROBILINOGEN, URINE: 1 E.U./DL
WBC # BLD: 10.2 K/UL (ref 4.8–10.8)
WBC UA: 2 /HPF (ref 0–5)

## 2022-06-12 PROCEDURE — 85610 PROTHROMBIN TIME: CPT

## 2022-06-12 PROCEDURE — 82550 ASSAY OF CK (CPK): CPT

## 2022-06-12 PROCEDURE — 2580000003 HC RX 258: Performed by: INTERNAL MEDICINE

## 2022-06-12 PROCEDURE — 93005 ELECTROCARDIOGRAM TRACING: CPT | Performed by: EMERGENCY MEDICINE

## 2022-06-12 PROCEDURE — 82947 ASSAY GLUCOSE BLOOD QUANT: CPT

## 2022-06-12 PROCEDURE — 51702 INSERT TEMP BLADDER CATH: CPT

## 2022-06-12 PROCEDURE — 85730 THROMBOPLASTIN TIME PARTIAL: CPT

## 2022-06-12 PROCEDURE — 70450 CT HEAD/BRAIN W/O DYE: CPT

## 2022-06-12 PROCEDURE — 81001 URINALYSIS AUTO W/SCOPE: CPT

## 2022-06-12 PROCEDURE — 87635 SARS-COV-2 COVID-19 AMP PRB: CPT

## 2022-06-12 PROCEDURE — 70450 CT HEAD/BRAIN W/O DYE: CPT | Performed by: RADIOLOGY

## 2022-06-12 PROCEDURE — 80307 DRUG TEST PRSMV CHEM ANLYZR: CPT

## 2022-06-12 PROCEDURE — 82077 ASSAY SPEC XCP UR&BREATH IA: CPT

## 2022-06-12 PROCEDURE — 6360000002 HC RX W HCPCS

## 2022-06-12 PROCEDURE — 2500000003 HC RX 250 WO HCPCS: Performed by: EMERGENCY MEDICINE

## 2022-06-12 PROCEDURE — 36600 WITHDRAWAL OF ARTERIAL BLOOD: CPT

## 2022-06-12 PROCEDURE — 36415 COLL VENOUS BLD VENIPUNCTURE: CPT

## 2022-06-12 PROCEDURE — 6360000002 HC RX W HCPCS: Performed by: INTERNAL MEDICINE

## 2022-06-12 PROCEDURE — 94640 AIRWAY INHALATION TREATMENT: CPT

## 2022-06-12 PROCEDURE — 96374 THER/PROPH/DIAG INJ IV PUSH: CPT

## 2022-06-12 PROCEDURE — 96375 TX/PRO/DX INJ NEW DRUG ADDON: CPT

## 2022-06-12 PROCEDURE — 2700000000 HC OXYGEN THERAPY PER DAY

## 2022-06-12 PROCEDURE — 71045 X-RAY EXAM CHEST 1 VIEW: CPT

## 2022-06-12 PROCEDURE — 2140000000 HC CCU INTERMEDIATE R&B

## 2022-06-12 PROCEDURE — 83605 ASSAY OF LACTIC ACID: CPT

## 2022-06-12 PROCEDURE — 6360000002 HC RX W HCPCS: Performed by: EMERGENCY MEDICINE

## 2022-06-12 PROCEDURE — 83880 ASSAY OF NATRIURETIC PEPTIDE: CPT

## 2022-06-12 PROCEDURE — HZ2ZZZZ DETOXIFICATION SERVICES FOR SUBSTANCE ABUSE TREATMENT: ICD-10-PCS | Performed by: HOSPITALIST

## 2022-06-12 PROCEDURE — 85025 COMPLETE CBC W/AUTO DIFF WBC: CPT

## 2022-06-12 PROCEDURE — 87040 BLOOD CULTURE FOR BACTERIA: CPT

## 2022-06-12 PROCEDURE — 99285 EMERGENCY DEPT VISIT HI MDM: CPT

## 2022-06-12 PROCEDURE — 80053 COMPREHEN METABOLIC PANEL: CPT

## 2022-06-12 PROCEDURE — 96365 THER/PROPH/DIAG IV INF INIT: CPT

## 2022-06-12 PROCEDURE — 80179 DRUG ASSAY SALICYLATE: CPT

## 2022-06-12 PROCEDURE — 84484 ASSAY OF TROPONIN QUANT: CPT

## 2022-06-12 PROCEDURE — 71045 X-RAY EXAM CHEST 1 VIEW: CPT | Performed by: RADIOLOGY

## 2022-06-12 PROCEDURE — 82803 BLOOD GASES ANY COMBINATION: CPT

## 2022-06-12 PROCEDURE — 2580000003 HC RX 258: Performed by: EMERGENCY MEDICINE

## 2022-06-12 PROCEDURE — 2500000003 HC RX 250 WO HCPCS

## 2022-06-12 PROCEDURE — 80143 DRUG ASSAY ACETAMINOPHEN: CPT

## 2022-06-12 RX ORDER — LORAZEPAM 2 MG/ML
3 INJECTION INTRAMUSCULAR
Status: DISCONTINUED | OUTPATIENT
Start: 2022-06-12 | End: 2022-06-14

## 2022-06-12 RX ORDER — BUDESONIDE 0.5 MG/2ML
0.5 INHALANT ORAL 2 TIMES DAILY
Status: DISCONTINUED | OUTPATIENT
Start: 2022-06-12 | End: 2022-06-20 | Stop reason: HOSPADM

## 2022-06-12 RX ORDER — LORAZEPAM 2 MG/ML
1 INJECTION INTRAMUSCULAR
Status: DISCONTINUED | OUTPATIENT
Start: 2022-06-12 | End: 2022-06-16

## 2022-06-12 RX ORDER — 0.9 % SODIUM CHLORIDE 0.9 %
1000 INTRAVENOUS SOLUTION INTRAVENOUS ONCE
Status: COMPLETED | OUTPATIENT
Start: 2022-06-12 | End: 2022-06-12

## 2022-06-12 RX ORDER — ONDANSETRON 2 MG/ML
4 INJECTION INTRAMUSCULAR; INTRAVENOUS EVERY 30 MIN PRN
Status: DISCONTINUED | OUTPATIENT
Start: 2022-06-12 | End: 2022-06-20 | Stop reason: HOSPADM

## 2022-06-12 RX ORDER — SODIUM CHLORIDE, SODIUM LACTATE, POTASSIUM CHLORIDE, CALCIUM CHLORIDE 600; 310; 30; 20 MG/100ML; MG/100ML; MG/100ML; MG/100ML
INJECTION, SOLUTION INTRAVENOUS CONTINUOUS
Status: DISCONTINUED | OUTPATIENT
Start: 2022-06-12 | End: 2022-06-14

## 2022-06-12 RX ORDER — LORAZEPAM 1 MG/1
2 TABLET ORAL
Status: DISCONTINUED | OUTPATIENT
Start: 2022-06-12 | End: 2022-06-16

## 2022-06-12 RX ORDER — ASPIRIN 81 MG/1
81 TABLET ORAL DAILY
Status: DISCONTINUED | OUTPATIENT
Start: 2022-06-12 | End: 2022-06-20 | Stop reason: HOSPADM

## 2022-06-12 RX ORDER — LORAZEPAM 2 MG/ML
INJECTION INTRAMUSCULAR
Status: COMPLETED
Start: 2022-06-12 | End: 2022-06-12

## 2022-06-12 RX ORDER — FOLIC ACID 1 MG/1
1 TABLET ORAL DAILY
Status: DISCONTINUED | OUTPATIENT
Start: 2022-06-12 | End: 2022-06-20 | Stop reason: HOSPADM

## 2022-06-12 RX ORDER — ENOXAPARIN SODIUM 100 MG/ML
40 INJECTION SUBCUTANEOUS NIGHTLY
Status: DISCONTINUED | OUTPATIENT
Start: 2022-06-12 | End: 2022-06-20 | Stop reason: HOSPADM

## 2022-06-12 RX ORDER — LORAZEPAM 1 MG/1
1 TABLET ORAL
Status: DISCONTINUED | OUTPATIENT
Start: 2022-06-12 | End: 2022-06-16

## 2022-06-12 RX ORDER — PANTOPRAZOLE SODIUM 40 MG/1
40 TABLET, DELAYED RELEASE ORAL
Status: DISCONTINUED | OUTPATIENT
Start: 2022-06-13 | End: 2022-06-20 | Stop reason: HOSPADM

## 2022-06-12 RX ORDER — LORAZEPAM 2 MG/ML
2 INJECTION INTRAMUSCULAR
Status: DISCONTINUED | OUTPATIENT
Start: 2022-06-12 | End: 2022-06-16

## 2022-06-12 RX ORDER — CLOPIDOGREL BISULFATE 75 MG/1
75 TABLET ORAL DAILY
Status: DISCONTINUED | OUTPATIENT
Start: 2022-06-12 | End: 2022-06-20 | Stop reason: HOSPADM

## 2022-06-12 RX ORDER — NOREPINEPHRINE BIT/0.9 % NACL 16MG/250ML
1-100 INFUSION BOTTLE (ML) INTRAVENOUS CONTINUOUS
Status: DISCONTINUED | OUTPATIENT
Start: 2022-06-12 | End: 2022-06-12

## 2022-06-12 RX ORDER — LORAZEPAM 1 MG/1
4 TABLET ORAL
Status: DISCONTINUED | OUTPATIENT
Start: 2022-06-12 | End: 2022-06-14

## 2022-06-12 RX ORDER — DULOXETIN HYDROCHLORIDE 30 MG/1
30 CAPSULE, DELAYED RELEASE ORAL DAILY
Status: DISCONTINUED | OUTPATIENT
Start: 2022-06-12 | End: 2022-06-13

## 2022-06-12 RX ORDER — ARFORMOTEROL TARTRATE 15 UG/2ML
15 SOLUTION RESPIRATORY (INHALATION) 2 TIMES DAILY
Status: DISCONTINUED | OUTPATIENT
Start: 2022-06-12 | End: 2022-06-20 | Stop reason: HOSPADM

## 2022-06-12 RX ORDER — MORPHINE SULFATE 4 MG/ML
4 INJECTION, SOLUTION INTRAMUSCULAR; INTRAVENOUS
Status: DISCONTINUED | OUTPATIENT
Start: 2022-06-12 | End: 2022-06-12

## 2022-06-12 RX ORDER — SODIUM CHLORIDE 0.9 % (FLUSH) 0.9 %
5-40 SYRINGE (ML) INJECTION EVERY 12 HOURS SCHEDULED
Status: DISCONTINUED | OUTPATIENT
Start: 2022-06-12 | End: 2022-06-20 | Stop reason: HOSPADM

## 2022-06-12 RX ORDER — SODIUM CHLORIDE 9 MG/ML
INJECTION, SOLUTION INTRAVENOUS PRN
Status: DISCONTINUED | OUTPATIENT
Start: 2022-06-12 | End: 2022-06-20 | Stop reason: HOSPADM

## 2022-06-12 RX ORDER — LISINOPRIL 10 MG/1
20 TABLET ORAL DAILY
Status: DISCONTINUED | OUTPATIENT
Start: 2022-06-12 | End: 2022-06-16

## 2022-06-12 RX ORDER — SODIUM CHLORIDE 0.9 % (FLUSH) 0.9 %
5-40 SYRINGE (ML) INJECTION PRN
Status: DISCONTINUED | OUTPATIENT
Start: 2022-06-12 | End: 2022-06-20 | Stop reason: HOSPADM

## 2022-06-12 RX ORDER — IPRATROPIUM BROMIDE AND ALBUTEROL SULFATE 2.5; .5 MG/3ML; MG/3ML
1 SOLUTION RESPIRATORY (INHALATION) EVERY 6 HOURS PRN
Status: DISCONTINUED | OUTPATIENT
Start: 2022-06-12 | End: 2022-06-20 | Stop reason: HOSPADM

## 2022-06-12 RX ORDER — ATORVASTATIN CALCIUM 40 MG/1
40 TABLET, FILM COATED ORAL DAILY
Status: DISCONTINUED | OUTPATIENT
Start: 2022-06-12 | End: 2022-06-20 | Stop reason: HOSPADM

## 2022-06-12 RX ORDER — LORAZEPAM 1 MG/1
3 TABLET ORAL
Status: DISCONTINUED | OUTPATIENT
Start: 2022-06-12 | End: 2022-06-14

## 2022-06-12 RX ORDER — LORAZEPAM 2 MG/ML
4 INJECTION INTRAMUSCULAR
Status: DISCONTINUED | OUTPATIENT
Start: 2022-06-12 | End: 2022-06-14

## 2022-06-12 RX ORDER — THIAMINE HYDROCHLORIDE 100 MG/ML
100 INJECTION, SOLUTION INTRAMUSCULAR; INTRAVENOUS DAILY
Status: DISCONTINUED | OUTPATIENT
Start: 2022-06-12 | End: 2022-06-15

## 2022-06-12 RX ADMIN — SODIUM CHLORIDE, PRESERVATIVE FREE 10 ML: 5 INJECTION INTRAVENOUS at 19:50

## 2022-06-12 RX ADMIN — ARFORMOTEROL TARTRATE 15 MCG: 15 SOLUTION RESPIRATORY (INHALATION) at 18:54

## 2022-06-12 RX ADMIN — Medication 5 MCG/MIN: at 16:34

## 2022-06-12 RX ADMIN — LORAZEPAM 2 MG: 2 INJECTION, SOLUTION INTRAMUSCULAR; INTRAVENOUS at 17:19

## 2022-06-12 RX ADMIN — SODIUM CHLORIDE 1000 ML: 9 INJECTION, SOLUTION INTRAVENOUS at 17:11

## 2022-06-12 RX ADMIN — BUDESONIDE 500 MCG: 0.5 SUSPENSION RESPIRATORY (INHALATION) at 18:54

## 2022-06-12 RX ADMIN — THIAMINE HYDROCHLORIDE 100 MG: 100 INJECTION, SOLUTION INTRAMUSCULAR; INTRAVENOUS at 16:49

## 2022-06-12 RX ADMIN — ENOXAPARIN SODIUM 40 MG: 100 INJECTION SUBCUTANEOUS at 19:51

## 2022-06-12 RX ADMIN — Medication 50 MEQ: at 16:37

## 2022-06-12 RX ADMIN — LORAZEPAM 2 MG: 2 INJECTION INTRAMUSCULAR; INTRAVENOUS at 19:50

## 2022-06-12 RX ADMIN — CEFEPIME HYDROCHLORIDE 2000 MG: 2 INJECTION, POWDER, FOR SOLUTION INTRAVENOUS at 17:13

## 2022-06-12 RX ADMIN — ONDANSETRON 4 MG: 2 INJECTION INTRAMUSCULAR; INTRAVENOUS at 15:24

## 2022-06-12 RX ADMIN — MORPHINE SULFATE 4 MG: 4 INJECTION, SOLUTION INTRAMUSCULAR; INTRAVENOUS at 15:23

## 2022-06-12 RX ADMIN — SODIUM BICARBONATE 50 MEQ: 84 INJECTION INTRAVENOUS at 16:37

## 2022-06-12 ASSESSMENT — ENCOUNTER SYMPTOMS
ABDOMINAL PAIN: 0
BACK PAIN: 1
FACIAL SWELLING: 0
SINUS PRESSURE: 0
NAUSEA: 0
VOICE CHANGE: 0
COUGH: 1
CHOKING: 0
CONSTIPATION: 0
DIARRHEA: 0
BLOOD IN STOOL: 0
APNEA: 0
SHORTNESS OF BREATH: 1
EYE DISCHARGE: 0
SORE THROAT: 0

## 2022-06-12 NOTE — ED PROVIDER NOTES
[facility]  eMERGENCY dEPARTMENT eNCOUnter      Pt Name: Primo Hernandez  MRN: [id number]  [place] 1955  Date of evaluation: 6/12/2022  Provider: Brianna Epstein MD    [address] [place] [address]       Chief Complaint   Patient presents with    Shortness of Breath     x3 days, neb given with EMS         HISTORY OF PRESENT ILLNESS   (Location/Symptom, Timing/Onset,Context/Setting, Quality, Duration, Modifying Factors, Severity)  Note limiting factors. Primo Hernandez is a 61 y.o. male who presents to the emergency department evaluation of shortness of breath    82-year-old male chronic alcoholic came in by ambulance with increasing shortness of breath x3 days. Had neb in route. And oxygen. History of COPD. He tells me that he stopped taking all of his home medicines because he wants to die. He is not actively suicidal he does have a suicidal plan but he states she is just tired of living in pain. He was released from pain management I expect over alcoholism but I am not certain. Patient states she is take chronic pain medicine daily but since they dismissed him he has had to endure the pain so he drinks. And he drinks alcohol every day. He is denying fever or chills to me. Chart shows history of stroke. He was supposed to be on Plavix. But the rest of his home medications are typically not life-sustaining. There medicines for management of his medical condition such as lisinopril. The history is provided by the patient and medical records. NursingNotes were reviewed. REVIEW OF SYSTEMS    (2-9 systems for level 4, 10 or more for level 5)     Review of Systems   Constitutional: Negative for chills and fever. HENT: Negative for congestion, drooling, facial swelling, nosebleeds, sinus pressure, sore throat and voice change. Eyes: Negative for discharge. Respiratory: Positive for cough and shortness of breath. Negative for apnea and choking.     Cardiovascular: Negative for chest pain and 20 MG TABLET    Take 20 mg by mouth daily    OMEPRAZOLE (PRILOSEC) 20 MG DELAYED RELEASE CAPSULE    Take 1 capsule by mouth daily       ALLERGIES     Niacin and related and Influenza virus vaccine    FAMILY HISTORY       Family History   Problem Relation Age of Onset    Alcohol Abuse Mother     Diabetes Father     Alcohol Abuse Father     Early Death Brother     Alcohol Abuse Brother     Alcohol Abuse Sister           SOCIAL HISTORY       Social History     Socioeconomic History    Marital status:      Spouse name: None    Number of children: 3    Years of education: None    Highest education level: None   Occupational History    Occupation:    Tobacco Use    Smoking status: Current Every Day Smoker     Packs/day: 1.00     Years: 52.00     Pack years: 52.00     Types: Cigars     Last attempt to quit: 2016     Years since quittin.3    Smokeless tobacco: Never Used    Tobacco comment: \"The party's over\"   Vaping Use    Vaping Use: Never used   Substance and Sexual Activity    Alcohol use: Yes     Alcohol/week: 0.0 standard drinks     Comment: a  pint of vodka daily Drank x 50 years    Drug use: Yes     Frequency: 2.0 times per week     Types: Marijuana [place])     Comment: occassionally. 1-2 Joints per week    Sexual activity: Yes     Partners: Female   Other Topics Concern    None   Social History Narrative    None     Social Determinants of Health     Financial Resource Strain:     Difficulty of Paying Living Expenses: Not on file   Food Insecurity:     Worried About Running Out of Food in the Last Year: Not on file    Edda of Food in the Last Year: Not on file   Transportation Needs:     Lack of Transportation (Medical): Not on file    Lack of Transportation (Non-Medical):  Not on file   Physical Activity:     Days of Exercise per Week: Not on file    Minutes of Exercise per Session: Not on file   Stress:     Feeling of Stress : Not on file   Social Connections:  Frequency of Communication with Friends and Family: Not on file    Frequency of Social Gatherings with Friends and Family: Not on file    Attends Confucianist Services: Not on file    Active Member of Clubs or Organizations: Not on file    Attends Club or Organization Meetings: Not on file    Marital Status: Not on file   Intimate Partner Violence:     Fear of Current or Ex-Partner: Not on file    Emotionally Abused: Not on file    Physically Abused: Not on file    Sexually Abused: Not on file   Housing Stability:     Unable to Pay for Housing in the Last Year: Not on file    Number of [place] in the Last Year: Not on file    Unstable Housing in the Last Year: Not on file       SCREENINGS    Jose Coma Scale  Eye Opening: Spontaneous  Best Verbal Response: Oriented  Best Motor Response: Obeys commands  Jose Coma Scale Score: 15        PHYSICAL EXAM    (up to 7 for level 4, 8 or more for level 5)     ED Triage Vitals   BP Temp Temp src Pulse Resp SpO2 Height Weight   -- -- -- -- -- -- -- --       Physical Exam  Vitals and nursing note reviewed. Constitutional:       Appearance: He is well-developed. Comments: He appears older and a little unkept. HENT:      Head: Normocephalic and atraumatic. Right Ear: Ear canal and external ear normal.      Left Ear: Ear canal and external ear normal.      Nose: Nose normal.      Mouth/Throat:      Mouth: Mucous membranes are moist.      Pharynx: Oropharynx is clear. Eyes:      General: No scleral icterus. Conjunctiva/sclera: Conjunctivae normal.      Pupils: Pupils are equal, round, and reactive to light. Cardiovascular:      Rate and Rhythm: Normal rate and regular rhythm. Pulses: Normal pulses. Heart sounds: Normal heart sounds. Pulmonary:      Effort: Pulmonary effort is normal.      Breath sounds: Rales (Scattered in the bases) present.    Abdominal:      General: Bowel sounds are normal.      Palpations: Abdomen is soft.   Musculoskeletal:         General: Normal range of motion. Cervical back: Normal range of motion and neck supple. Skin:     General: Skin is warm and dry. Neurological:      Mental Status: He is alert and oriented to person, place, and time. DIAGNOSTIC RESULTS     EKG: All EKG's are interpreted by the Emergency Department Physician who either signs or Co-signs this chart in the absence of a cardiologist.    Leonie Martinez #1 showed sinus tachycardia at rate 118. AZ interval 168. QTc was elongated at 529. No ST abnormality to suggest ischemia. EKG #2 showed sinus tachycardia rate 121. AZ interval 165. QTc 395. No ST abnormality noted. The QT C has improved. RADIOLOGY:   Non-plain film images such as CT, Ultrasound and MRI are read by the radiologist. Plainradiographic images are visualized and preliminarily interpreted by the emergency physician with the below findings:    I have reviewed the images and results. Interpretation per the Radiologist below, if available at the time of this note:    CT Head WO Contrast   Final Result   1. No acute intracranial abnormality. 2. Stable moderate cerebral and cerebellar volume loss, moderate periventricular and deep white matter small vessel changes. 3.  Volume loss and gliosis from chronic ischemic event in the left mid and inferior cerebellum are also stable. Recommendation: Follow up as clinically indicated. All CT scans at this facility utilize dose modulation, iterative reconstruction, and/or weight based dosing when appropriate to reduce radiation dose to as low as reasonably achievable. Electronically Signed by Timothy Henderson MD at 12-Jun-2022 05:35:53 PM               XR CHEST PORTABLE   Final Result   Cardiomegaly and vascular congestion and small left pleural effusion. Recommendation: Follow up recommended.     Electronically Signed by Phillip Durham MD, [address] [place] [address] at [id number] 04:11:44 PM                     ED BEDSIDE ULTRASOUND:   Performed by ED Physician - none    LABS:  Labs Reviewed   CBC WITH AUTO DIFFERENTIAL - Abnormal; Notable for the following components:       Result Value    RBC 4.13 (*)     .0 (*)     MCH 34.9 (*)     MCHC 32.3 (*)     Neutrophils % 74.8 (*)     Lymphocytes % 16.3 (*)     Basophils % 1.1 (*)     Neutrophils Absolute 7.6 (*)     All other components within normal limits   COMPREHENSIVE METABOLIC PANEL W/ REFLEX TO MG FOR LOW K - Abnormal; Notable for the following components:    Sodium 128 (*)     Chloride 95 (*)     CO2 9 (*)     Anion Gap 24 (*)     Glucose 178 (*)     BUN 25 (*)     CREATININE 1.8 (*)     GFR Non- 38 (*)     GFR  46 (*)     AST 66 (*)     All other components within normal limits    Narrative:     CALL  Dada STOUT tel. ,  Chemistry results called to and read back by adrien baumann er, 06/12/2022 14:45, by  [facility]  Chemistry results called to and read back by adrien baumann er, 06/12/2022 14:44, by  [facility]   TROPONIN - Abnormal; Notable for the following components:    Troponin 0.05 (*)     All other components within normal limits   BRAIN NATRIURETIC PEPTIDE - Abnormal; Notable for the following components:    Pro-BNP 11,699 (*)     All other components within normal limits    Narrative:     CALL  Dada STOUT tel. ,  Chemistry results called to and read back by adrien baumann er, 06/12/2022 14:45, by  [facility]  Chemistry results called to and read back by adrien baumann er, 06/12/2022 14:44, by  [facility]   BLOOD GAS, ARTERIAL - Abnormal; Notable for the following components:    pH, Arterial 7.290 (*)     pCO2, Arterial 16.0 (*)     pO2, Arterial 71.0 (*)     HCO3, Arterial 7.7 (*)     Base Excess, Arterial -16.2 (*)     All other components within normal limits    Narrative:     CALL  Dada STOUT tel. ,  adrien sanchez rn er, 06/12/2022 16:29, by [facility]   LACTIC ACID - Abnormal; Notable for the following components:    Lactic Acid 5.3 (*)     All other components within normal limits    Narrative:     CALL  [place]  [facility] tel. ,  Chemistry results called to and read back by bennett baumann er, 06/12/2022 16:51, by  [facility]   POCT GLUCOSE - Abnormal; Notable for the following components:    POC Glucose 167 (*)     All other components within normal limits   COVID-19, RAPID   CULTURE, BLOOD 1   CULTURE, BLOOD 2   ETHANOL   APTT   PROTIME-INR   TROPONIN   ACETAMINOPHEN LEVEL   SALICYLATE LEVEL   URINE DRUG SCREEN   URINALYSIS WITH REFLEX TO CULTURE   CK       All other labs were within normal range or not returned as of this dictation. EMERGENCY DEPARTMENT COURSE and DIFFERENTIALDIAGNOSIS/MDM:   Vitals:    Vitals:    06/12/22 1612 06/12/22 1617 06/12/22 1622 06/12/22 1626   BP: (!) 101/53 (!) 47/39 (!) 74/65 (!) 75/54   Pulse: (!) 124 (!) 120 (!) 122 (!) 123   Resp: 21 20 20 20   Temp:       TempSrc:       SpO2: 97% 95% 95% (!) 86%       MDM  Number of Diagnoses or Management Options  Diagnosis management comments: During the evaluation gave patient 4 mg of morphine for his pain complaints his chronic pain complaints was complaining of angina or chest pain or any other specific pain other than his back and neck pain. About 15 minutes later he became hypotensive and unresponsive had a leftward gaze. Sternal rub brought him around. Pressure was low again most small fluid bolus. Obtained a CT scan which showed old stroke and pretty advanced atrophy. His chest x-ray was concerning for vascular congestion so I was hesitant to give him a large fluid bolus since he was here with shortness of breath. As troponin was 0.  05. Creatinine one-point 8 repeat troponin is pending. His blood pressure remained low after small fluid bolus so I am going to give him some low-dose peripheral Levophed until can further assess. Patient once again developed unresponsiveness it looks like a seizure difficulty breathing pursing his lips leftward gaze.   I gave him 2 of Ativan and it stopped and the patient was not postictal.  in fact he was talking and telling stories and even made a joke. Again not complaining of any new symptoms or pain. He swears he has not drank anything other than alcohol or taking any other substances but he has appearance of a metabolic alkalosis. Lactic came back elevated and I am going to do cultures and cover with antibiotics though he tells me he has not had a fever or concern of infection that he is aware of. COVID was negative. EKG changes other than sinus tachycardia. I discussed the case Dr. Genesis Escobedo the ICU attending. CONSULTS:  None    PROCEDURES:  Unless otherwise notedbelow, none     Procedures    FINAL IMPRESSION     1. Metabolic acidosis    2. Acute alcoholic intoxication with complication ([facility] [address]    3. Acute renal insufficiency    4. Lactic acid acidosis    5.  Observed seizure-like activity ([facility] [address]          DISPOSITION/PLAN   DISPOSITION Decision To Admit 06/12/2022 04:51:51 PM      PATIENT REFERRED TO:  @FUP@    DISCHARGE MEDICATIONS:  New Prescriptions    No medications on file          (Please note that portions of this note were completed with a voice recognition program.  Efforts were made to edit the dictations butoccasionally words are mis-transcribed.)    Rosemarie Park MD (electronically signed)  AttendingEmerNational Park Medical Center Physician          Moises Merino MD  06/12/22 1489

## 2022-06-12 NOTE — H&P
[address]    Patient: Bhakti Garcia   : [id number]   MRN: [id number]  Code Status: Full Code  PCP: No primary care provider on file. Date of Service: 2022    Chief Complaint:   Shortness of breath    History of Present Illness:   53-year-old male with past medical history as listed below presented to ER with shortness of breath. Poor historian. Multiple prior ER visits. History of alcohol dependence. Last drank a pint of vodka within the last 24 hours. Denies any current complaints at this time. States he is feeling much better. No further history provided.     Review of Systems:   A comprehensive review of systems was negative except for: Alcohol dependence    Past Medical History:     Past Medical History:   Diagnosis Date    Alcohol abuse     Lonliness    Cerebellar stroke, acute ([facility] [address] 2016    Left    COPD (chronic obstructive pulmonary disease) (HCC)     Diagnosed in     Degenerative disc disease at L5-S1 level     GERD (gastroesophageal reflux disease)     Duodenal ulcer    Hyperlipidemia     Hypertension     Iron (Fe) deficiency anemia     Pneumonia     COPD    Renal insufficiency     chronic kidney disease    Spinal stenosis     cervical    Ulcer        Past Surgical History:     Past Surgical History:   Procedure Laterality Date    COLONOSCOPY      Polyps - benign     ENDOSCOPY, COLON, DIAGNOSTIC      Duodenal ulcer     HERNIA REPAIR      Umbillical hernia        Family History:     Family History   Problem Relation Age of Onset    Alcohol Abuse Mother     Diabetes Father     Alcohol Abuse Father     Early Death Brother     Alcohol Abuse Brother     Alcohol Abuse Sister         Social History:     Social History     Socioeconomic History    Marital status:      Spouse name: None    Number of children: 3    Years of education: None    Highest education level: None   Occupational History    Occupation:    Tobacco Use    Smoking status: Current Every Day Smoker     Packs/day: 1.00     Years: 52.00     Pack years: 52.00     Types: Cigars     Last attempt to quit: 2016     Years since quittin.3    Smokeless tobacco: Never Used    Tobacco comment: \"The party's over\"   Vaping Use    Vaping Use: Never used   Substance and Sexual Activity    Alcohol use: Yes     Alcohol/week: 0.0 standard drinks     Comment: a  pint of vodka daily Drank x 50 years    Drug use: Yes     Frequency: 2.0 times per week     Types: Marijuana [place])     Comment: occassionally. 1-2 Joints per week    Sexual activity: Yes     Partners: Female   Other Topics Concern    None   Social History Narrative    None     Social Determinants of Health     Financial Resource Strain:     Difficulty of Paying Living Expenses: Not on file   Food Insecurity:     Worried About Running Out of Food in the Last Year: Not on file    Edda of Food in the Last Year: Not on file   Transportation Needs:     Lack of Transportation (Medical): Not on file    Lack of Transportation (Non-Medical):  Not on file   Physical Activity:     Days of Exercise per Week: Not on file    Minutes of Exercise per Session: Not on file   Stress:     Feeling of Stress : Not on file   Social Connections:     Frequency of Communication with Friends and Family: Not on file    Frequency of Social Gatherings with Friends and Family: Not on file    Attends Yazidism Services: Not on file    Active Member of Clubs or Organizations: Not on file    Attends Club or Organization Meetings: Not on file    Marital Status: Not on file   Intimate Partner Violence:     Fear of Current or Ex-Partner: Not on file    Emotionally Abused: Not on file    Physically Abused: Not on file    Sexually Abused: Not on file   Housing Stability:     Unable to Pay for Housing in the Last Year: Not on file    Number of [place] in the Last Year: Not on file    Unstable Housing in the Last Year: Not on file       Prior to Admission Medications:   Not in a hospital admission. Allergies:      Allergies   Allergen Reactions    Niacin And Related Anaphylaxis    Influenza Virus Vaccine          Physical Exam:   /77   Pulse (!) 118   Temp 97.8 °F (36.6 °C) (Oral)   Resp 21   Ht 5' 9\" (1.753 m)   Wt 165 lb (74.8 kg)   SpO2 95%   BMI 24.37 kg/m²     General: no acute distress  HEENT: normocephalic, atraumatic  Neck: supple, symmetrical, trachea midline   Lungs: clear to auscultation bilaterally   Cardiovascular: s1 and s2 normal  Abdomen: soft, positive bowel sounds  Extremities: no cyanosis   Neuro: aaox3, denies SI/HI    Recent Results (from the past 72 hour(s))   CBC with Auto Differential    Collection Time: 06/12/22  2:08 PM   Result Value Ref Range    WBC 10.2 4.8 - 10.8 K/uL    RBC 4.13 (L) 4.70 - 6.10 M/uL    Hemoglobin 14.4 14.0 - 18.0 g/dL    Hematocrit 44.6 42.0 - 52.0 %    .0 (H) 80.0 - 94.0 fL    MCH 34.9 (H) 27.0 - 31.0 pg    MCHC 32.3 (L) 33.0 - 37.0 g/dL    RDW 14.4 11.5 - 14.5 %    Platelets 238 729 - 596 K/uL    MPV 10.8 9.4 - 12.4 fL    Neutrophils % 74.8 (H) 50.0 - 65.0 %    Lymphocytes % 16.3 (L) 20.0 - 40.0 %    Monocytes % 6.1 0.0 - 10.0 %    Eosinophils % 0.9 0.0 - 5.0 %    Basophils % 1.1 (H) 0.0 - 1.0 %    Neutrophils Absolute 7.6 (H) 1.5 - 7.5 K/uL    Immature Granulocytes # 0.1 K/uL    Lymphocytes Absolute 1.7 1.1 - 4.5 K/uL    Monocytes Absolute 0.60 0.00 - 0.90 K/uL    Eosinophils Absolute 0.10 0.00 - 0.60 K/uL    Basophils Absolute 0.10 0.00 - 0.20 K/uL   Comprehensive Metabolic Panel w/ Reflex to MG    Collection Time: 06/12/22  2:08 PM   Result Value Ref Range    Sodium 128 (L) 136 - 145 mmol/L    Potassium reflex Magnesium 4.4 3.5 - 5.0 mmol/L    Chloride 95 (L) 98 - 111 mmol/L    CO2 9 (LL) 22 - 29 mmol/L    Anion Gap 24 (H) 7 - 19 mmol/L    Glucose 178 (H) 74 - 109 mg/dL    BUN 25 (H) 8 - 23 mg/dL    CREATININE 1.8 (H) 0.5 - 1.2 mg/dL    GFR Non- 38 (A) >60    GFR  46 (L) >59    Calcium 8.9 8.8 - 10.2 mg/dL    Total Protein 6.9 6.6 - 8.7 g/dL    Albumin 3.5 3.5 - 5.2 g/dL    Total Bilirubin 0.6 0.2 - 1.2 mg/dL    Alkaline Phosphatase 77 40 - 130 U/L    ALT 29 5 - 41 U/L    AST 66 (H) 5 - 40 U/L   Troponin    Collection Time: 06/12/22  2:08 PM   Result Value Ref Range    Troponin 0.05 (H) 0.00 - 0.03 ng/mL   Brain Natriuretic Peptide    Collection Time: 06/12/22  2:08 PM   Result Value Ref Range    Pro-BNP 11,699 (H) 0 - 900 pg/mL   Ethanol    Collection Time: 06/12/22  2:08 PM   Result Value Ref Range    Ethanol Lvl 224 mg/dL   Acetaminophen Level    Collection Time: 06/12/22  2:08 PM   Result Value Ref Range    Acetaminophen Level <96 ug/mL   Salicylate    Collection Time: 06/12/22  2:08 PM   Result Value Ref Range    Salicylate, Serum <9.6 3.0 - 10.0 mg/dL   CK    Collection Time: 06/12/22  2:08 PM   Result Value Ref Range    Total  39 - 308 U/L   SARS-CoV-2 NAAT (Rapid)    Collection Time: 06/12/22  3:21 PM    Specimen: Nasopharyngeal Swab   Result Value Ref Range    SARS-CoV-2, NAAT Not Detected Not Detected   POCT Glucose    Collection Time: 06/12/22  3:48 PM   Result Value Ref Range    POC Glucose 167 (H) 70 - 99 mg/dl    Performed on AccuChek    Lactic Acid    Collection Time: 06/12/22  4:20 PM   Result Value Ref Range    Lactic Acid 5.3 (HH) 0.5 - 1.9 mmol/L   APTT    Collection Time: 06/12/22  4:20 PM   Result Value Ref Range    aPTT 30.9 26.0 - 36.2 sec   Protime-INR    Collection Time: 06/12/22  4:20 PM   Result Value Ref Range    Protime 13.9 12.0 - 14.6 sec    INR 1.07 0.88 - 1.18   Blood Gas, Arterial    Collection Time: 06/12/22  4:27 PM   Result Value Ref Range    pH, Arterial 7.290 (LL) 7.350 - 7.450    pCO2, Arterial 16.0 (LL) 35.0 - 45.0 mmHg    pO2, Arterial 71.0 (L) 80.0 - 100.0 mmHg    HCO3, Arterial 7.7 (L) 22.0 - 26.0 mmol/L    Base Excess, Arterial -16.2 (L) -2.0 - 2.0 mmol/L    Hemoglobin, Art, Extended 14.3 14.0 - 18.0 g/dL    O2 Sat, Arterial 90.8 >92 %    Carboxyhgb, Arterial 1.9 0.0 - 5.0 %    Methemoglobin, Arterial 1.3 <1.5 %    O2 Content, Arterial 18.3 Not Established mL/dL    O2 Therapy Unknown     FIO2 21.0 Not Established %    ALLENS TEST POS  RB     Potassium, Whole Blood 3.7    Urine Drug Screen    Collection Time: 06/12/22  4:58 PM   Result Value Ref Range    Amphetamine Screen, Urine Negative Negative <1000 ng/mL    Barbiturate Screen, Ur Negative Negative < 200 ng/mL    Benzodiazepine Screen, Urine Negative Negative <100 ng/mL    Cannabinoid Scrn, Ur Positive (A) Negative <50 ng/mL    Cocaine Metabolite Screen, Urine Negative Negative <300 ng/mL    Opiate Scrn, Ur Negative Negative < 300 ng/mL    Drug Screen Comment: see below    Urinalysis with Reflex to Culture    Collection Time: 06/12/22  4:58 PM    Specimen: Urine, straight catheter   Result Value Ref Range    Color, UA YELLOW Straw/Yellow    Clarity, UA Clear Clear    Glucose, Ur Negative Negative mg/dL    Bilirubin Urine Negative Negative    Ketones, Urine TRACE (A) Negative mg/dL    Specific Gravity, UA 1.016 1.005 - 1.030    Blood, Urine SMALL (A) Negative    pH, UA 5.5 5.0 - 8.0    Protein,  Negative mg/dL    Urobilinogen, Urine 1.0 <2.0 E.U./dL    Nitrite, Urine Negative Negative    Leukocyte Esterase, Urine Negative Negative   Microscopic Urinalysis    Collection Time: 06/12/22  4:58 PM   Result Value Ref Range    Bacteria, UA NEGATIVE (A) None Seen /HPF    Crystals, UA NEG (A) None Seen /HPF    Hyaline Casts, UA 3 0 - 8 /HPF    WBC, UA 2 0 - 5 /HPF    RBC, UA 3 0 - 4 /HPF    Epithelial Cells, UA 1 0 - 5 /HPF       I/O this shift:  In: -   Out: 150 [Urine:150]    CT Head WO Contrast    Result Date: 6/12/2022  1. No acute intracranial abnormality. 2. Stable moderate cerebral and cerebellar volume loss, moderate periventricular and deep white matter small vessel changes.  3.  Volume loss and gliosis from chronic ischemic event in the left mid and inferior cerebellum are also stable. Recommendation: Follow up as clinically indicated. All CT scans at this facility utilize dose modulation, iterative reconstruction, and/or weight based dosing when appropriate to reduce radiation dose to as low as reasonably achievable. Electronically Signed by Shawna rAaya MD at 12-Jun-2022 05:35:53 PM             XR CHEST PORTABLE    Result Date: 6/12/2022  Cardiomegaly and vascular congestion and small left pleural effusion. Recommendation: Follow up recommended.  Electronically Signed by Anastasiia Lew MD, [facility] CERTIFIED at [id number] 04:11:44 PM             Assessment and Plan:   History of alcohol dependence with acute intoxication  Monitor for withdrawal  CIWA protocol  Thiamine/folic acid/MVI  IVF  Trend lactate to clearance  Follow electrolytes  Counseled  Social work  Short-lived hypotension in ER likely multifactorial exacerbated by opioid administration  Most recent     LES  Unsure of baseline creatinine  IVF  Avoid offending agents  Follow renal function/urine output/electrolytes    Tobacco dependence  Counseled    Eval for mood disorder  Denies SI/HI  Psychiatry consulted    History of stroke  Supportive care    DVT prophylaxis  Lovenox    Total critical care time: 70 minutes  Total time spent managing the care of this patient: 70 minutes    Dominga White MD  6/12/2022 6:12 PM

## 2022-06-12 NOTE — Clinical Note
Discharge Plan[de-identified] Other/[facility])   Telemetry/Cardiac Monitoring Required?: Yes

## 2022-06-13 LAB
ALBUMIN SERPL-MCNC: 3.3 G/DL (ref 3.5–5.2)
ALP BLD-CCNC: 68 U/L (ref 40–130)
ALT SERPL-CCNC: 26 U/L (ref 5–41)
ANION GAP SERPL CALCULATED.3IONS-SCNC: 17 MMOL/L (ref 7–19)
AST SERPL-CCNC: 53 U/L (ref 5–40)
BASOPHILS ABSOLUTE: 0.1 K/UL (ref 0–0.2)
BASOPHILS RELATIVE PERCENT: 1.4 % (ref 0–1)
BILIRUB SERPL-MCNC: 0.9 MG/DL (ref 0.2–1.2)
BUN BLDV-MCNC: 27 MG/DL (ref 8–23)
CALCIUM SERPL-MCNC: 8.5 MG/DL (ref 8.8–10.2)
CHLORIDE BLD-SCNC: 100 MMOL/L (ref 98–111)
CO2: 16 MMOL/L (ref 22–29)
CREAT SERPL-MCNC: 2 MG/DL (ref 0.5–1.2)
EKG P AXIS: 69 DEGREES
EKG P-R INTERVAL: 164 MS
EKG Q-T INTERVAL: 326 MS
EKG QRS DURATION: 84 MS
EKG QTC CALCULATION (BAZETT): 432 MS
EKG T AXIS: 3 DEGREES
EOSINOPHILS ABSOLUTE: 0 K/UL (ref 0–0.6)
EOSINOPHILS RELATIVE PERCENT: 0.4 % (ref 0–5)
GFR AFRICAN AMERICAN: 41
GFR NON-AFRICAN AMERICAN: 34
GLUCOSE BLD-MCNC: 129 MG/DL (ref 74–109)
GLUCOSE BLD-MCNC: 139 MG/DL (ref 70–99)
GLUCOSE BLD-MCNC: 158 MG/DL (ref 70–99)
HCT VFR BLD CALC: 39.2 % (ref 42–52)
HEMOGLOBIN: 12.7 G/DL (ref 14–18)
IMMATURE GRANULOCYTES #: 0.1 K/UL
LYMPHOCYTES ABSOLUTE: 1.2 K/UL (ref 1.1–4.5)
LYMPHOCYTES RELATIVE PERCENT: 15.8 % (ref 20–40)
MAGNESIUM: 1.2 MG/DL (ref 1.6–2.4)
MCH RBC QN AUTO: 34.5 PG (ref 27–31)
MCHC RBC AUTO-ENTMCNC: 32.4 G/DL (ref 33–37)
MCV RBC AUTO: 106.5 FL (ref 80–94)
MONOCYTES ABSOLUTE: 0.5 K/UL (ref 0–0.9)
MONOCYTES RELATIVE PERCENT: 6.2 % (ref 0–10)
NEUTROPHILS ABSOLUTE: 5.6 K/UL (ref 1.5–7.5)
NEUTROPHILS RELATIVE PERCENT: 75.4 % (ref 50–65)
PDW BLD-RTO: 14.3 % (ref 11.5–14.5)
PERFORMED ON: ABNORMAL
PERFORMED ON: ABNORMAL
PLATELET # BLD: 131 K/UL (ref 130–400)
PMV BLD AUTO: 10.9 FL (ref 9.4–12.4)
POTASSIUM SERPL-SCNC: 4.2 MMOL/L (ref 3.5–5)
RBC # BLD: 3.68 M/UL (ref 4.7–6.1)
SODIUM BLD-SCNC: 133 MMOL/L (ref 136–145)
TOTAL PROTEIN: 5.6 G/DL (ref 6.6–8.7)
WBC # BLD: 7.4 K/UL (ref 4.8–10.8)

## 2022-06-13 PROCEDURE — 2700000000 HC OXYGEN THERAPY PER DAY

## 2022-06-13 PROCEDURE — 83735 ASSAY OF MAGNESIUM: CPT

## 2022-06-13 PROCEDURE — 6370000000 HC RX 637 (ALT 250 FOR IP): Performed by: INTERNAL MEDICINE

## 2022-06-13 PROCEDURE — 93010 ELECTROCARDIOGRAM REPORT: CPT | Performed by: INTERNAL MEDICINE

## 2022-06-13 PROCEDURE — 94640 AIRWAY INHALATION TREATMENT: CPT

## 2022-06-13 PROCEDURE — 36415 COLL VENOUS BLD VENIPUNCTURE: CPT

## 2022-06-13 PROCEDURE — 6360000002 HC RX W HCPCS: Performed by: EMERGENCY MEDICINE

## 2022-06-13 PROCEDURE — 85025 COMPLETE CBC W/AUTO DIFF WBC: CPT

## 2022-06-13 PROCEDURE — 6360000002 HC RX W HCPCS: Performed by: INTERNAL MEDICINE

## 2022-06-13 PROCEDURE — 2140000000 HC CCU INTERMEDIATE R&B

## 2022-06-13 PROCEDURE — 99252 IP/OBS CONSLTJ NEW/EST SF 35: CPT | Performed by: PSYCHIATRY & NEUROLOGY

## 2022-06-13 PROCEDURE — 82947 ASSAY GLUCOSE BLOOD QUANT: CPT

## 2022-06-13 PROCEDURE — 80053 COMPREHEN METABOLIC PANEL: CPT

## 2022-06-13 PROCEDURE — 6370000000 HC RX 637 (ALT 250 FOR IP): Performed by: PSYCHIATRY & NEUROLOGY

## 2022-06-13 PROCEDURE — 51702 INSERT TEMP BLADDER CATH: CPT

## 2022-06-13 PROCEDURE — 2580000003 HC RX 258: Performed by: INTERNAL MEDICINE

## 2022-06-13 RX ORDER — MAGNESIUM SULFATE IN WATER 40 MG/ML
4000 INJECTION, SOLUTION INTRAVENOUS ONCE
Status: COMPLETED | OUTPATIENT
Start: 2022-06-13 | End: 2022-06-13

## 2022-06-13 RX ORDER — LABETALOL HYDROCHLORIDE 5 MG/ML
20 INJECTION, SOLUTION INTRAVENOUS EVERY 4 HOURS PRN
Status: DISCONTINUED | OUTPATIENT
Start: 2022-06-13 | End: 2022-06-13 | Stop reason: SDUPTHER

## 2022-06-13 RX ORDER — LABETALOL 20 MG/4 ML (5 MG/ML) INTRAVENOUS SYRINGE
20 EVERY 4 HOURS PRN
Status: DISCONTINUED | OUTPATIENT
Start: 2022-06-13 | End: 2022-06-14

## 2022-06-13 RX ORDER — HYDRALAZINE HYDROCHLORIDE 20 MG/ML
10 INJECTION INTRAMUSCULAR; INTRAVENOUS EVERY 6 HOURS PRN
Status: DISCONTINUED | OUTPATIENT
Start: 2022-06-13 | End: 2022-06-14

## 2022-06-13 RX ORDER — LANOLIN ALCOHOL/MO/W.PET/CERES
3 CREAM (GRAM) TOPICAL NIGHTLY
Status: DISCONTINUED | OUTPATIENT
Start: 2022-06-13 | End: 2022-06-20 | Stop reason: HOSPADM

## 2022-06-13 RX ORDER — MIRTAZAPINE 7.5 MG/1
3.75 TABLET, FILM COATED ORAL NIGHTLY
Status: DISCONTINUED | OUTPATIENT
Start: 2022-06-13 | End: 2022-06-20 | Stop reason: HOSPADM

## 2022-06-13 RX ORDER — LORAZEPAM 2 MG/ML
2 INJECTION INTRAMUSCULAR ONCE
Status: DISCONTINUED | OUTPATIENT
Start: 2022-06-12 | End: 2022-06-14

## 2022-06-13 RX ORDER — FUROSEMIDE 10 MG/ML
60 INJECTION INTRAMUSCULAR; INTRAVENOUS ONCE
Status: COMPLETED | OUTPATIENT
Start: 2022-06-13 | End: 2022-06-13

## 2022-06-13 RX ADMIN — BUDESONIDE 500 MCG: 0.5 SUSPENSION RESPIRATORY (INHALATION) at 18:30

## 2022-06-13 RX ADMIN — SODIUM CHLORIDE, POTASSIUM CHLORIDE, SODIUM LACTATE AND CALCIUM CHLORIDE: 600; 310; 30; 20 INJECTION, SOLUTION INTRAVENOUS at 09:53

## 2022-06-13 RX ADMIN — MIRTAZAPINE 3.75 MG: 7.5 TABLET ORAL at 21:19

## 2022-06-13 RX ADMIN — BUDESONIDE 500 MCG: 0.5 SUSPENSION RESPIRATORY (INHALATION) at 06:38

## 2022-06-13 RX ADMIN — LORAZEPAM 1 MG: 1 TABLET ORAL at 08:15

## 2022-06-13 RX ADMIN — LORAZEPAM 4 MG: 2 INJECTION INTRAMUSCULAR; INTRAVENOUS at 04:48

## 2022-06-13 RX ADMIN — ENOXAPARIN SODIUM 40 MG: 100 INJECTION SUBCUTANEOUS at 21:18

## 2022-06-13 RX ADMIN — LISINOPRIL 20 MG: 10 TABLET ORAL at 11:23

## 2022-06-13 RX ADMIN — SODIUM CHLORIDE, PRESERVATIVE FREE 10 ML: 5 INJECTION INTRAVENOUS at 08:18

## 2022-06-13 RX ADMIN — ATORVASTATIN CALCIUM 40 MG: 40 TABLET, FILM COATED ORAL at 08:15

## 2022-06-13 RX ADMIN — ASPIRIN 81 MG: 81 TABLET, COATED ORAL at 08:15

## 2022-06-13 RX ADMIN — HYDRALAZINE HYDROCHLORIDE 10 MG: 20 INJECTION, SOLUTION INTRAMUSCULAR; INTRAVENOUS at 11:59

## 2022-06-13 RX ADMIN — ARFORMOTEROL TARTRATE 15 MCG: 15 SOLUTION RESPIRATORY (INHALATION) at 18:30

## 2022-06-13 RX ADMIN — CLOPIDOGREL BISULFATE 75 MG: 75 TABLET ORAL at 08:15

## 2022-06-13 RX ADMIN — Medication 3 MG: at 21:19

## 2022-06-13 RX ADMIN — THIAMINE HYDROCHLORIDE 100 MG: 100 INJECTION, SOLUTION INTRAMUSCULAR; INTRAVENOUS at 08:15

## 2022-06-13 RX ADMIN — LORAZEPAM 2 MG: 2 INJECTION INTRAMUSCULAR; INTRAVENOUS at 00:55

## 2022-06-13 RX ADMIN — DULOXETINE HYDROCHLORIDE 30 MG: 30 CAPSULE, DELAYED RELEASE ORAL at 08:15

## 2022-06-13 RX ADMIN — FUROSEMIDE 60 MG: 10 INJECTION, SOLUTION INTRAMUSCULAR; INTRAVENOUS at 01:59

## 2022-06-13 RX ADMIN — LORAZEPAM 2 MG: 2 INJECTION INTRAMUSCULAR; INTRAVENOUS at 11:40

## 2022-06-13 RX ADMIN — ARFORMOTEROL TARTRATE 15 MCG: 15 SOLUTION RESPIRATORY (INHALATION) at 06:38

## 2022-06-13 RX ADMIN — FOLIC ACID 1 MG: 1 TABLET ORAL at 08:15

## 2022-06-13 RX ADMIN — MAGNESIUM SULFATE HEPTAHYDRATE 4000 MG: 40 INJECTION, SOLUTION INTRAVENOUS at 12:21

## 2022-06-13 RX ADMIN — SODIUM CHLORIDE, PRESERVATIVE FREE 10 ML: 5 INJECTION INTRAVENOUS at 21:19

## 2022-06-13 SDOH — HEALTH STABILITY: MENTAL HEALTH: HOW MANY STANDARD DRINKS CONTAINING ALCOHOL DO YOU HAVE ON A TYPICAL DAY?: 10 OR MORE

## 2022-06-13 SDOH — HEALTH STABILITY: MENTAL HEALTH: HOW OFTEN DO YOU HAVE A DRINK CONTAINING ALCOHOL?: 4 OR MORE TIMES A WEEK

## 2022-06-13 NOTE — PLAN OF CARE
Problem: Discharge Planning  Goal: Discharge to home or other facility with appropriate resources  Outcome: Progressing  Flowsheets (Taken 6/12/2022 1928)  Discharge to home or other facility with appropriate resources:   Identify barriers to discharge with patient and caregiver   Arrange for needed discharge resources and transportation as appropriate     Problem: Safety - Adult  Goal: Free from fall injury  Outcome: Progressing     Problem: Skin/Tissue Integrity  Goal: Absence of new skin breakdown  Description: 1. Monitor for areas of redness and/or skin breakdown  2. Assess vascular access sites hourly  3. Every 4-6 hours minimum:  Change oxygen saturation probe site  4. Every 4-6 hours:  If on nasal continuous positive airway pressure, respiratory therapy assess nares and determine need for appliance change or resting period.   Outcome: Progressing     Problem: Chronic Conditions and Co-morbidities  Goal: Patient's chronic conditions and co-morbidity symptoms are monitored and maintained or improved  Outcome: Progressing
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
## 2022-06-13 NOTE — PLAN OF CARE
Problem: Discharge Planning  Goal: Discharge to home or other facility with appropriate resources  Outcome: Progressing  Flowsheets (Taken 6/13/2022 1000)  Discharge to home or other facility with appropriate resources: Identify barriers to discharge with patient and caregiver     Problem: Safety - Adult  Goal: Free from fall injury  Outcome: Progressing  Flowsheets (Taken 6/13/2022 1000)  Free From Fall Injury: Instruct family/caregiver on patient safety     Problem: Skin/Tissue Integrity  Goal: Absence of new skin breakdown  Description: 1. Monitor for areas of redness and/or skin breakdown  2. Assess vascular access sites hourly  3. Every 4-6 hours minimum:  Change oxygen saturation probe site  4. Every 4-6 hours:  If on nasal continuous positive airway pressure, respiratory therapy assess nares and determine need for appliance change or resting period.   Outcome: Progressing     Problem: Chronic Conditions and Co-morbidities  Goal: Patient's chronic conditions and co-morbidity symptoms are monitored and maintained or improved  Outcome: Progressing  Flowsheets (Taken 6/13/2022 1000)  Care Plan - Patient's Chronic Conditions and Co-Morbidity Symptoms are Monitored and Maintained or Improved: Monitor and assess patient's chronic conditions and comorbid symptoms for stability, deterioration, or improvement

## 2022-06-13 NOTE — PROGRESS NOTES
Robin Valentin arrived to room # 710. Presented with: hypotensive,alcohol abuse  Mental Status: Patient is oriented, alert, coherent, logical, thought processes intact and able to concentrate and follow conversation. Vitals:    06/12/22 1921   BP: (!) 138/94   Pulse:    Resp:    Temp:    SpO2:      Patient safety contract and falls prevention contract reviewed with patient Yes. Oriented Patient to room. Call light within reach.  No.  Needs, issues or concerns expressed at this time: no.      Electronically signed by Tessa Wright RN on 6/12/2022 at 7:25 PM

## 2022-06-13 NOTE — CARE COORDINATION
Pt is awaiting psych consult for possible admit to [facility]. SW following and will assist with resources for OP if determined more appropriate when pt is deemed medically stable for dc.

## 2022-06-13 NOTE — PROGRESS NOTES
4 Eyes Skin Assessment    Christo Nieto is being assessed upon: Admission    I agree that I, Manisha Knight, RN, along with Jaimee Guerin RN (either 2 RN's or 1 LPN and 1 RN) have performed a thorough Head to Toe Skin Assessment on the patient. ALL assessment sites listed below have been assessed. Areas assessed by both nurses:     [x]   Head, Face, and Ears   [x]   Shoulders, Back, and Chest  [x]   Arms, Elbows, and Hands   [x]   Coccyx, Sacrum, and Ischium  [x]   Legs, Feet, and Heels    Does the Patient have Skin Breakdown?  No    Efren Prevention initiated: Yes  Wound Care Orders initiated: No    WOC nurse consulted for Pressure Injury (Stage 3,4, Unstageable, DTI, NWPT, and Complex wounds) and New or Established Ostomies: No        Primary Nurse eSignature: Manisha Knight RN on 6/12/2022 at 7:27 PM      Co-Signer eSignature: Electronically signed by Jaimee Guerin RN on 6/13/22 at 2:40 AM CDT

## 2022-06-13 NOTE — PROGRESS NOTES
Marijuana (Weed)     Comment: occassionally. 1-2 Joints per week    Sexual activity: Yes     Partners: Female   Other Topics Concern    Not on file   Social History Narrative    Not on file     Social Determinants of Health     Financial Resource Strain:     Difficulty of Paying Living Expenses: Not on file   Food Insecurity:     Worried About Running Out of Food in the Last Year: Not on file    Edda of Food in the Last Year: Not on file   Transportation Needs:     Lack of Transportation (Medical): Not on file    Lack of Transportation (Non-Medical):  Not on file   Physical Activity:     Days of Exercise per Week: Not on file    Minutes of Exercise per Session: Not on file   Stress:     Feeling of Stress : Not on file   Social Connections:     Frequency of Communication with Friends and Family: Not on file    Frequency of Social Gatherings with Friends and Family: Not on file    Attends Evangelical Services: Not on file    Active Member of Clubs or Organizations: Not on file    Attends Club or Organization Meetings: Not on file    Marital Status: Not on file   Intimate Partner Violence:     Fear of Current or Ex-Partner: Not on file    Emotionally Abused: Not on file    Physically Abused: Not on file    Sexually Abused: Not on file   Housing Stability:     Unable to Pay for Housing in the Last Year: Not on file    Number of [place] in the Last Year: Not on file    Unstable Housing in the Last Year: Not on file       Current Facility-Administered Medications   Medication Dose Route Frequency Provider Last Rate Last Admin    LORazepam (ATIVAN) injection 2 mg  2 mg IntraVENous Once John Paul Arreola MD        ondansetron [facility]) injection 4 mg  4 mg IntraVENous Q30 Min PRN John Paul Arreola MD   4 mg at 06/12/22 1524    thiamine (B-1) injection 100 mg  100 mg IntraVENous Daily John Paul Arreola MD   100 mg at 06/13/22 0815    vancomycin (VANCOCIN) 1,500 mg in dextrose 5 % 500 mL MD        Or    LORazepam (ATIVAN) tablet 4 mg  4 mg Oral Q1H PRN Jericho Gambino MD        Or    LORazepam (ATIVAN) injection 4 mg  4 mg IntraVENous Q1H PRN Jericho Gambino MD   4 mg at 06/13/22 0448    Arformoterol Tartrate (BROVANA) nebulizer solution 15 mcg  15 mcg Nebulization BID Jericho Gambino MD   15 mcg at 06/13/22 0468    budesonide (PULMICORT) nebulizer suspension 500 mcg  0.5 mg Nebulization BID Jericho Gambino MD   500 mcg at 06/13/22 9740         lactated ringers 75 mL/hr at 06/13/22 0953    sodium chloride          Objective:   BP (!) 160/103   Pulse (!) 107   Temp 97.7 °F (36.5 °C) (Temporal)   Resp 16   Ht 5' 9\" (1.753 m)   Wt 182 lb 11.2 oz (82.9 kg)   SpO2 96%   BMI 26.98 kg/m²     HEENT: normocephalic, atraumatic  Neck: supple, symmetrical, trachea midline   Lungs: clear to auscultation bilaterally   Cardiovascular: s1 and s2 normal  Abdomen: soft, positive bowel sounds  Extremities: no cyanosis   Neuro: alert, withdrawing from alcohol    Recent Labs     06/12/22  1408 06/13/22  0151   WBC 10.2 7.4   RBC 4.13* 3.68*   HGB 14.4 12.7*   HCT 44.6 39.2*   .0* 106.5*   MCH 34.9* 34.5*   MCHC 32.3* 32.4*    131     Recent Labs     06/12/22  1408 06/12/22  1627 06/13/22  0151   *  --  133*   K 4.4 3.7 4.2   ANIONGAP 24*  --  17   CL 95*  --  100   CO2 9*  --  16*   BUN 25*  --  27*   CREATININE 1.8*  --  2.0*   GLUCOSE 178*  --  129*   CALCIUM 8.9  --  8.5*     Recent Labs     06/13/22  0151   MG 1.2*     Recent Labs     06/12/22  1408 06/13/22  0151   AST 66* 53*   ALT 29 26   BILITOT 0.6 0.9   ALKPHOS 77 68     No results for input(s): PH, PO2, PCO2, HCO3, BE, O2SAT in the last 72 hours.   Recent Labs     06/12/22  1408 06/12/22  1928   TROPONINI 0.05* <0.01     Recent Labs     06/12/22  1620   INR 1.07     Recent Labs     06/12/22  2223   LACTA 3.3*         Intake/Output Summary (Last 24 hours) at 6/13/2022 1112  Last data filed at 6/13/2022 0905  Gross per 24 hour Intake 125 ml   Output 350 ml   Net -225 ml       CT Head WO Contrast    Result Date: 6/12/2022  NO PRIOR REPORT AVAILABLE Exam: CT OF THE BRAIN WITHOUT CONTRAST Clinical data: Acute neurologic change. Left gaze. Technique: Contiguous axial images are obtained from the skull base to vertex without intravenous contrast. Reformatted/MPR images were performed. Radiation dose: CTDIvol =37.42 mGy, DLP =745 mGy x cm. Prior studies: CT scan of the brain dated 05/01/2020. Findings: Stable moderate cerebral and cerebellar volume loss, moderate periventricular and deep white matter small vessel changes. Volume loss and gliosis from chronic ischemic event in the left mid and inferior cerebellum are also stable. No acute intracranial  abnormality is present. No evidence of acute cortical infarction, hemorrhage, mass or mass effect. No hydrocephalus or abnormal extra-axial fluid collections are present. The posterior fossa is unremarkable. The skull base and calvarium are intact. The included portions of the paranasal sinuses and mastoid air cells are clear. 1.  No acute intracranial abnormality. 2. Stable moderate cerebral and cerebellar volume loss, moderate periventricular and deep white matter small vessel changes. 3.  Volume loss and gliosis from chronic ischemic event in the left mid and inferior cerebellum are also stable. Recommendation: Follow up as clinically indicated. All CT scans at this facility utilize dose modulation, iterative reconstruction, and/or weight based dosing when appropriate to reduce radiation dose to as low as reasonably achievable. Electronically Signed by Jesse Buchanan MD at 12-Jun-2022 05:35:53 PM             XR CHEST PORTABLE    Result Date: 6/12/2022  NO PRIOR REPORT AVAILABLE [facility] Clinical data:Shortness of breath. Technique:Single view of the chest. Prior studies: Radiographs of the chest dated 04/30/2020. Findings:Stable cardiomegaly. Vascular congestion.   No consolidation or pneumothorax and a small left effusion. The visualized regional bones appear intact. Cardiomegaly and vascular congestion and small left pleural effusion. Recommendation: Follow up recommended.  Electronically Signed by Nathaniel Silva MD, [facility] CERTIFIED at [id number] 04:11:44 PM              Assessment and Plan:   History of alcohol dependence with acute intoxication/withdrawal  [facility] protocol  Thiamine/folic acid/MVI  IVF  Lactate improved  Follow electrolytes  Counseled  Social work  Short-lived hypotension in ER likely multifactorial exacerbated by opioid administration  No further hypotension     CKD 3  Avoid offending agents  Follow renal function/urine output/electrolytes     Tobacco dependence  Counseled     Eval for mood disorder  Denies SI/HI  Psychiatry consulted     History of stroke  Supportive care     DVT prophylaxis  Harrisx    Jean Lai MD   6/13/2022 11:12 AM

## 2022-06-14 ENCOUNTER — APPOINTMENT (OUTPATIENT)
Dept: NUCLEAR MEDICINE | Age: 67
DRG: 897 | End: 2022-06-14
Payer: MEDICAID

## 2022-06-14 LAB
ALBUMIN SERPL-MCNC: 3.5 G/DL (ref 3.5–5.2)
ALP BLD-CCNC: 82 U/L (ref 40–130)
ALT SERPL-CCNC: 23 U/L (ref 5–41)
ANION GAP SERPL CALCULATED.3IONS-SCNC: 18 MMOL/L (ref 7–19)
AST SERPL-CCNC: 44 U/L (ref 5–40)
BACTERIA: NEGATIVE /HPF
BASOPHILS ABSOLUTE: 0.1 K/UL (ref 0–0.2)
BASOPHILS RELATIVE PERCENT: 1 % (ref 0–1)
BILIRUB SERPL-MCNC: 1.1 MG/DL (ref 0.2–1.2)
BILIRUBIN URINE: NEGATIVE
BLOOD, URINE: NEGATIVE
BUN BLDV-MCNC: 33 MG/DL (ref 8–23)
CALCIUM SERPL-MCNC: 9.1 MG/DL (ref 8.8–10.2)
CHLORIDE BLD-SCNC: 99 MMOL/L (ref 98–111)
CLARITY: CLEAR
CO2: 18 MMOL/L (ref 22–29)
COLOR: YELLOW
CREAT SERPL-MCNC: 1.9 MG/DL (ref 0.5–1.2)
CRYSTALS, UA: ABNORMAL /HPF
D DIMER: 1.31 UG/ML FEU (ref 0–0.48)
EOSINOPHILS ABSOLUTE: 0.1 K/UL (ref 0–0.6)
EOSINOPHILS RELATIVE PERCENT: 1.8 % (ref 0–5)
EPITHELIAL CELLS, UA: 0 /HPF (ref 0–5)
FERRITIN: 822.3 NG/ML (ref 30–400)
FOLATE: 8.6 NG/ML (ref 4.5–32.2)
GFR AFRICAN AMERICAN: 43
GFR NON-AFRICAN AMERICAN: 36
GLUCOSE BLD-MCNC: 105 MG/DL (ref 74–109)
GLUCOSE URINE: NEGATIVE MG/DL
HCT VFR BLD CALC: 43.4 % (ref 42–52)
HEMOGLOBIN: 13.7 G/DL (ref 14–18)
HYALINE CASTS: 1 /HPF (ref 0–8)
IMMATURE GRANULOCYTES #: 0.1 K/UL
IRON SATURATION: 16 % (ref 14–50)
IRON: 54 UG/DL (ref 59–158)
KETONES, URINE: NEGATIVE MG/DL
LACTIC ACID: 1.3 MMOL/L (ref 0.5–1.9)
LEUKOCYTE ESTERASE, URINE: NEGATIVE
LIPASE: 37 U/L (ref 13–60)
LV EF: 50 %
LVEF MODALITY: NORMAL
LYMPHOCYTES ABSOLUTE: 1 K/UL (ref 1.1–4.5)
LYMPHOCYTES RELATIVE PERCENT: 12.9 % (ref 20–40)
MAGNESIUM: 2.7 MG/DL (ref 1.6–2.4)
MCH RBC QN AUTO: 34.6 PG (ref 27–31)
MCHC RBC AUTO-ENTMCNC: 31.6 G/DL (ref 33–37)
MCV RBC AUTO: 109.6 FL (ref 80–94)
MONOCYTES ABSOLUTE: 0.5 K/UL (ref 0–0.9)
MONOCYTES RELATIVE PERCENT: 5.7 % (ref 0–10)
NEUTROPHILS ABSOLUTE: 6.1 K/UL (ref 1.5–7.5)
NEUTROPHILS RELATIVE PERCENT: 78 % (ref 50–65)
NITRITE, URINE: NEGATIVE
PDW BLD-RTO: 14.5 % (ref 11.5–14.5)
PH UA: 5.5 (ref 5–8)
PLATELET # BLD: 126 K/UL (ref 130–400)
PMV BLD AUTO: 11.4 FL (ref 9.4–12.4)
POTASSIUM SERPL-SCNC: 4.5 MMOL/L (ref 3.5–5)
PROTEIN UA: 30 MG/DL
RBC # BLD: 3.96 M/UL (ref 4.7–6.1)
RBC UA: 2 /HPF (ref 0–4)
SODIUM BLD-SCNC: 135 MMOL/L (ref 136–145)
SPECIFIC GRAVITY UA: 1.01 (ref 1–1.03)
TOTAL CK: 308 U/L (ref 39–308)
TOTAL IRON BINDING CAPACITY: 346 UG/DL (ref 250–400)
TOTAL PROTEIN: 6.1 G/DL (ref 6.6–8.7)
TSH SERPL DL<=0.05 MIU/L-ACNC: 2.88 UIU/ML (ref 0.27–4.2)
UROBILINOGEN, URINE: 1 E.U./DL
VITAMIN B-12: 784 PG/ML (ref 211–946)
VITAMIN D 25-HYDROXY: 20.7 NG/ML
WBC # BLD: 7.8 K/UL (ref 4.8–10.8)
WBC UA: 1 /HPF (ref 0–5)

## 2022-06-14 PROCEDURE — 6370000000 HC RX 637 (ALT 250 FOR IP): Performed by: PSYCHIATRY & NEUROLOGY

## 2022-06-14 PROCEDURE — 2700000000 HC OXYGEN THERAPY PER DAY

## 2022-06-14 PROCEDURE — 97165 OT EVAL LOW COMPLEX 30 MIN: CPT

## 2022-06-14 PROCEDURE — 78580 LUNG PERFUSION IMAGING: CPT | Performed by: RADIOLOGY

## 2022-06-14 PROCEDURE — 82306 VITAMIN D 25 HYDROXY: CPT

## 2022-06-14 PROCEDURE — 36415 COLL VENOUS BLD VENIPUNCTURE: CPT

## 2022-06-14 PROCEDURE — 83690 ASSAY OF LIPASE: CPT

## 2022-06-14 PROCEDURE — 78580 LUNG PERFUSION IMAGING: CPT

## 2022-06-14 PROCEDURE — 80053 COMPREHEN METABOLIC PANEL: CPT

## 2022-06-14 PROCEDURE — 85025 COMPLETE CBC W/AUTO DIFF WBC: CPT

## 2022-06-14 PROCEDURE — 6360000004 HC RX CONTRAST MEDICATION: Performed by: HOSPITALIST

## 2022-06-14 PROCEDURE — 82550 ASSAY OF CK (CPK): CPT

## 2022-06-14 PROCEDURE — 94640 AIRWAY INHALATION TREATMENT: CPT

## 2022-06-14 PROCEDURE — 84443 ASSAY THYROID STIM HORMONE: CPT

## 2022-06-14 PROCEDURE — 81001 URINALYSIS AUTO W/SCOPE: CPT

## 2022-06-14 PROCEDURE — 83605 ASSAY OF LACTIC ACID: CPT

## 2022-06-14 PROCEDURE — 82728 ASSAY OF FERRITIN: CPT

## 2022-06-14 PROCEDURE — 2580000003 HC RX 258: Performed by: INTERNAL MEDICINE

## 2022-06-14 PROCEDURE — 6370000000 HC RX 637 (ALT 250 FOR IP): Performed by: INTERNAL MEDICINE

## 2022-06-14 PROCEDURE — 82607 VITAMIN B-12: CPT

## 2022-06-14 PROCEDURE — 97535 SELF CARE MNGMENT TRAINING: CPT

## 2022-06-14 PROCEDURE — 97162 PT EVAL MOD COMPLEX 30 MIN: CPT

## 2022-06-14 PROCEDURE — 2500000003 HC RX 250 WO HCPCS: Performed by: HOSPITALIST

## 2022-06-14 PROCEDURE — 97116 GAIT TRAINING THERAPY: CPT

## 2022-06-14 PROCEDURE — 83540 ASSAY OF IRON: CPT

## 2022-06-14 PROCEDURE — 6360000002 HC RX W HCPCS: Performed by: INTERNAL MEDICINE

## 2022-06-14 PROCEDURE — 6360000002 HC RX W HCPCS: Performed by: EMERGENCY MEDICINE

## 2022-06-14 PROCEDURE — 6370000000 HC RX 637 (ALT 250 FOR IP): Performed by: HOSPITALIST

## 2022-06-14 PROCEDURE — A9540 TC99M MAA: HCPCS | Performed by: NURSE PRACTITIONER

## 2022-06-14 PROCEDURE — 2140000000 HC CCU INTERMEDIATE R&B

## 2022-06-14 PROCEDURE — 85379 FIBRIN DEGRADATION QUANT: CPT

## 2022-06-14 PROCEDURE — 3430000000 HC RX DIAGNOSTIC RADIOPHARMACEUTICAL: Performed by: NURSE PRACTITIONER

## 2022-06-14 PROCEDURE — 82746 ASSAY OF FOLIC ACID SERUM: CPT

## 2022-06-14 PROCEDURE — 6360000002 HC RX W HCPCS: Performed by: HOSPITALIST

## 2022-06-14 PROCEDURE — 83735 ASSAY OF MAGNESIUM: CPT

## 2022-06-14 PROCEDURE — 83550 IRON BINDING TEST: CPT

## 2022-06-14 PROCEDURE — C8929 TTE W OR WO FOL WCON,DOPPLER: HCPCS

## 2022-06-14 RX ORDER — HYDRALAZINE HYDROCHLORIDE 20 MG/ML
5 INJECTION INTRAMUSCULAR; INTRAVENOUS EVERY 6 HOURS PRN
Status: DISCONTINUED | OUTPATIENT
Start: 2022-06-14 | End: 2022-06-20 | Stop reason: HOSPADM

## 2022-06-14 RX ORDER — LABETALOL HYDROCHLORIDE 5 MG/ML
10 INJECTION, SOLUTION INTRAVENOUS EVERY 4 HOURS PRN
Status: DISCONTINUED | OUTPATIENT
Start: 2022-06-14 | End: 2022-06-20 | Stop reason: HOSPADM

## 2022-06-14 RX ORDER — SODIUM BICARBONATE 650 MG/1
650 TABLET ORAL 4 TIMES DAILY
Status: DISCONTINUED | OUTPATIENT
Start: 2022-06-14 | End: 2022-06-20 | Stop reason: HOSPADM

## 2022-06-14 RX ORDER — CHLORDIAZEPOXIDE HYDROCHLORIDE 5 MG/1
5 CAPSULE, GELATIN COATED ORAL 3 TIMES DAILY
Status: DISCONTINUED | OUTPATIENT
Start: 2022-06-14 | End: 2022-06-16

## 2022-06-14 RX ORDER — BUMETANIDE 0.25 MG/ML
0.5 INJECTION, SOLUTION INTRAMUSCULAR; INTRAVENOUS DAILY
Status: DISCONTINUED | OUTPATIENT
Start: 2022-06-14 | End: 2022-06-14

## 2022-06-14 RX ORDER — ERGOCALCIFEROL 1.25 MG/1
50000 CAPSULE ORAL WEEKLY
Status: DISCONTINUED | OUTPATIENT
Start: 2022-06-14 | End: 2022-06-20 | Stop reason: HOSPADM

## 2022-06-14 RX ADMIN — SODIUM CHLORIDE, POTASSIUM CHLORIDE, SODIUM LACTATE AND CALCIUM CHLORIDE: 600; 310; 30; 20 INJECTION, SOLUTION INTRAVENOUS at 02:28

## 2022-06-14 RX ADMIN — LISINOPRIL 20 MG: 10 TABLET ORAL at 08:47

## 2022-06-14 RX ADMIN — BUMETANIDE 0.5 MG: 0.25 INJECTION INTRAMUSCULAR; INTRAVENOUS at 11:38

## 2022-06-14 RX ADMIN — ERGOCALCIFEROL 50000 UNITS: 1.25 CAPSULE ORAL at 18:53

## 2022-06-14 RX ADMIN — SODIUM CHLORIDE, PRESERVATIVE FREE 10 ML: 5 INJECTION INTRAVENOUS at 20:59

## 2022-06-14 RX ADMIN — MIRTAZAPINE 3.75 MG: 7.5 TABLET ORAL at 20:59

## 2022-06-14 RX ADMIN — SODIUM BICARBONATE 650 MG: 650 TABLET ORAL at 15:14

## 2022-06-14 RX ADMIN — PANTOPRAZOLE SODIUM 40 MG: 40 TABLET, DELAYED RELEASE ORAL at 05:36

## 2022-06-14 RX ADMIN — LORAZEPAM 1 MG: 2 INJECTION INTRAMUSCULAR; INTRAVENOUS at 21:54

## 2022-06-14 RX ADMIN — CHLORDIAZEPOXIDE HYDROCHLORIDE 5 MG: 5 CAPSULE ORAL at 11:37

## 2022-06-14 RX ADMIN — ARFORMOTEROL TARTRATE 15 MCG: 15 SOLUTION RESPIRATORY (INHALATION) at 18:40

## 2022-06-14 RX ADMIN — ASPIRIN 81 MG: 81 TABLET, COATED ORAL at 08:47

## 2022-06-14 RX ADMIN — SODIUM BICARBONATE 650 MG: 650 TABLET ORAL at 21:00

## 2022-06-14 RX ADMIN — BUDESONIDE 500 MCG: 0.5 SUSPENSION RESPIRATORY (INHALATION) at 06:43

## 2022-06-14 RX ADMIN — HYDRALAZINE HYDROCHLORIDE 5 MG: 20 INJECTION INTRAMUSCULAR; INTRAVENOUS at 18:33

## 2022-06-14 RX ADMIN — SODIUM CHLORIDE, PRESERVATIVE FREE 10 ML: 5 INJECTION INTRAVENOUS at 08:54

## 2022-06-14 RX ADMIN — CLOPIDOGREL BISULFATE 75 MG: 75 TABLET ORAL at 08:47

## 2022-06-14 RX ADMIN — LORAZEPAM 1 MG: 2 INJECTION INTRAMUSCULAR; INTRAVENOUS at 03:50

## 2022-06-14 RX ADMIN — ENOXAPARIN SODIUM 40 MG: 100 INJECTION SUBCUTANEOUS at 20:59

## 2022-06-14 RX ADMIN — LORAZEPAM 1 MG: 2 INJECTION INTRAMUSCULAR; INTRAVENOUS at 18:34

## 2022-06-14 RX ADMIN — ATORVASTATIN CALCIUM 40 MG: 40 TABLET, FILM COATED ORAL at 08:47

## 2022-06-14 RX ADMIN — ARFORMOTEROL TARTRATE 15 MCG: 15 SOLUTION RESPIRATORY (INHALATION) at 06:43

## 2022-06-14 RX ADMIN — THIAMINE HYDROCHLORIDE 100 MG: 100 INJECTION, SOLUTION INTRAMUSCULAR; INTRAVENOUS at 08:48

## 2022-06-14 RX ADMIN — PERFLUTREN 1.5 ML: 6.52 INJECTION, SUSPENSION INTRAVENOUS at 09:54

## 2022-06-14 RX ADMIN — SODIUM BICARBONATE 650 MG: 650 TABLET ORAL at 11:37

## 2022-06-14 RX ADMIN — BUDESONIDE 500 MCG: 0.5 SUSPENSION RESPIRATORY (INHALATION) at 18:40

## 2022-06-14 RX ADMIN — CHLORDIAZEPOXIDE HYDROCHLORIDE 5 MG: 5 CAPSULE ORAL at 20:59

## 2022-06-14 RX ADMIN — Medication 5 MILLICURIE: at 14:27

## 2022-06-14 RX ADMIN — Medication 3 MG: at 21:00

## 2022-06-14 RX ADMIN — CHLORDIAZEPOXIDE HYDROCHLORIDE 5 MG: 5 CAPSULE ORAL at 16:52

## 2022-06-14 RX ADMIN — FOLIC ACID 1 MG: 1 TABLET ORAL at 08:48

## 2022-06-14 RX ADMIN — SODIUM BICARBONATE 650 MG: 650 TABLET ORAL at 18:53

## 2022-06-14 ASSESSMENT — ENCOUNTER SYMPTOMS
TROUBLE SWALLOWING: 0
SORE THROAT: 0
DIARRHEA: 0
CHEST TIGHTNESS: 0
ABDOMINAL PAIN: 0
SHORTNESS OF BREATH: 0
VOMITING: 0
COLOR CHANGE: 0
BACK PAIN: 1

## 2022-06-14 ASSESSMENT — PAIN SCALES - GENERAL: PAINLEVEL_OUTOF10: 0

## 2022-06-14 NOTE — CARE COORDINATION
Therapy evals completed and document minimal ambulation with moderate to full assist. Spoke with pt who is in agreement unable to return to his home and manage care needs at this time. Pt resides in [facility] and is agreeable to a referral to [facility]. Pt's insurance will cover services for 30days and pt agrees also stating \"I may never be able to get back home if I don't get stronger. \"    SW made the referral and awaiting review/acceptance from the facility at this time.      [facility]   [phone]  Fax [phone]

## 2022-06-14 NOTE — PROGRESS NOTES
Occupational Therapy Initial Assessment  Date: 2022   Patient Name: Janessa Amaya  MRN: [id number]     : 1955    Date of Service: 2022    Discharge Recommendations:  Patient would benefit from continued therapy after discharge       Assessment   Assessment: Evaluation completed and tx initiated. The patient is not safe to mobilize or perform ADL on his own. If discharges home, patient would need 24/7 care  Treatment Diagnosis: Metabolic acidosis, hypotension, acute intoxication withdrawal  History: depression, alcohol abuse, COPD, stroke     Activity Tolerance  Activity Tolerance Comments: Participated in all tasks, but is easily fatigued and has confusion           Patient Diagnosis(es): The primary encounter diagnosis was Metabolic acidosis. Diagnoses of Acute alcoholic intoxication with complication ([facility] [address] Acute renal insufficiency, Lactic acid acidosis, and Observed seizure-like activity ([facility] [address] were also pertinent to this visit. has a past medical history of Alcohol abuse, Cerebellar stroke, acute ([address] COPD (chronic obstructive pulmonary disease) ([address] Degenerative disc disease at L5-S1 level, GERD (gastroesophageal reflux disease), Hyperlipidemia, Hypertension, Iron (Fe) deficiency anemia, Pneumonia, Renal insufficiency, Spinal stenosis, and Ulcer. has a past surgical history that includes hernia repair; Colonoscopy; and Endoscopy, colon, diagnostic.     Treatment Diagnosis: Metabolic acidosis, hypotension, acute intoxication withdrawal      Restrictions  Restrictions/Precautions  Restrictions/Precautions: Fall Risk,Seizure  Required Braces or Orthoses?: No    Subjective   General  Chart Reviewed: Yes  Patient assessed for rehabilitation services?: Yes  Family / Caregiver Present: No  Pre Treatment Pain Screening  Pain at present: 7  Scale Used: Numeric Score  Intervention List: Patient able to continue with treatment  Comments / Details: Chronic back and neck,pain    Social/Functional History  Social/Functional History  Lives With: Alone (States he has a worker who helps with home management)  Type of Home: House  ADL Assistance: Independent (per patient)  Ambulation Assistance: Independent (with rollator)       Objective              Toilet Transfers  Toilet - Technique: Stand step  Toilet Transfer: Minimal assistance (of two)  ADL  Feeding: Supervision  Grooming: Supervision  UE Bathing: Minimal assistance  LE Bathing: Minimal assistance  UE Dressing: Supervision  LE Dressing: Minimal assistance  Toileting: Minimal assistance        Bed mobility  Supine to Sit: Contact guard assistance  Transfers  Stand Step Transfers: Minimal assistance (of 2)     Cognition  Cognition Comment: Able to engage patient in therapeutic activity, requires supervision and redirection                 LUE AROM (degrees)  LUE AROM : WFL  RUE AROM (degrees)  RUE AROM : WFL                    Plan   Plan  Times per Week: 3-5    Goals  Short Term Goals  Short Term Goal 1: CGA for dressing  Short Term Goal 2: CGA for toileting  Short Term Goal 3: CGA for transfers  Short Term Goal 4: CGA for light ambulatory ADL  Short Term Goal 5: Independent with therapeutic activity recommendations  Long Term Goals  Long Term Goal 1: Upgrade as tolerated         Tx initiated:  Balance and mobility tasks, reorientation tasks.   (15 mins)          Jaleel Kilpatrick OT/JAKE  Electronically signed by Jaleel Kilpatrick OT/JAKE on 6/14/2022 at 11:14 AM.

## 2022-06-14 NOTE — PROGRESS NOTES
Physical Therapy  Facility/Department: [facility] PROGRESSIVE CARE  Physical Therapy Initial Assessment    Name: Pradip Walker  :   MRN: [id number]  Date of Service: 2022    Discharge Recommendations:  Continue to assess pending progress,24 hour supervision or assist,Patient would benefit from continued therapy after discharge          Patient Diagnosis(es): The primary encounter diagnosis was Metabolic acidosis. Diagnoses of Acute alcoholic intoxication with complication ([facility] [address] Acute renal insufficiency, Lactic acid acidosis, and Observed seizure-like activity ([facility] [address] were also pertinent to this visit. Past Medical History:  has a past medical history of Alcohol abuse, Cerebellar stroke, acute ([facility] [address] COPD (chronic obstructive pulmonary disease) ([facility] [address] Degenerative disc disease at L5-S1 level, GERD (gastroesophageal reflux disease), Hyperlipidemia, Hypertension, Iron (Fe) deficiency anemia, Pneumonia, Renal insufficiency, Spinal stenosis, and Ulcer. Past Surgical History:  has a past surgical history that includes hernia repair; Colonoscopy; and Endoscopy, colon, diagnostic. Assessment   Body Structures, Functions, Activity Limitations Requiring Skilled Therapeutic Intervention: Decreased functional mobility ; Decreased ROM; Decreased sensation;Decreased cognition;Decreased safe awareness;Decreased strength;Decreased balance; Increased pain;Decreased posture;Decreased coordination  Assessment: Pt. will benefit from cont. PT to decrease impairments. Pt. a fall risk due to heavy posterior lean and lack of self correction for balance in standing, and he should not attempt mobility on his own at this time. Anticipate pt will need additional therapy upon d/c from [facility]. Pt. safer to use SS for transfers with staff due to difficulty initiating steps and holding balance. Pt. needs to use RW, gait belt and 2A with PT. Do not feel pt would be safe to d/c home to prior living situation.   Treatment Diagnosis: impaired gait and mobility  Therapy Prognosis: Good  Decision Making: Medium Complexity  Barriers to Learning: cognition  Requires PT Follow-Up: Yes  Activity Tolerance  Activity Tolerance: Patient limited by fatigue;Treatment limited secondary to decreased cognition;Patient limited by endurance  Activity Tolerance Comments: pt needed rest breaks due to SOA     Plan   Plan  Plan: 6-7 times per week  Plan weeks: 2  Current Treatment Recommendations: Strengthening,ROM,Balance training,Functional mobility training,Transfer training,Gait training,Endurance training,Safety education & training,Patient/Caregiver education & training,Equipment evaluation, education, & procurement,Therapeutic activities,Positioning  Plan Comment: cont. PT per POC. Safety Devices  Type of Devices: Call light within reach,Nurse notified,Chair alarm in place,Gait belt,Left in chair,Patient at risk for falls,Heels elevated for pressure relief (reclined)     Restrictions  Restrictions/Precautions  Restrictions/Precautions: Fall Risk,Seizure  Required Braces or Orthoses?: No     Subjective   Pain: complained of chronic back and neck pain  General  Chart Reviewed: Yes  Patient assessed for rehabilitation services?: Yes  Response To Previous Treatment: Not applicable  Family / Caregiver Present: No  Referring Practitioner: Cynthia Nunez MD, Ze Burdick, APRWANDA - CNP  Referral Date : 06/14/22  Diagnosis: metabolic acidosis, acute alcohol intoxication, lactic acidosis, observed seizure like activity, acute renal insufficiency  Follows Commands: Impaired  Other (Comment): needs repetition  General Comment  Comments: Brandon IVY PT. Subjective  Subjective: Pt. willing to work with therapy.          Social/Functional History  Social/Functional History  Lives With: Alone (States he has a worker who helps with home management)  Type of Home: House  ADL Assistance: Independent (per patient)  Ambulation Assistance: Independent (with rollator)  Vision/Hearing  Hearing  Hearing: Within functional limits    Cognition   Orientation  Overall Orientation Status: Impaired  Orientation Level: Oriented to place;Oriented to person;Disoriented to time;Disoriented to situation  Cognition  Cognition Comment: Able to engage patient in therapeutic activity, requires supervision and redirection     Objective   Heart Rate: 90  Heart Rate Source: Monitor  BP: (!) 154/95  BP Location: Right upper arm  MAP (Calculated): 114.67  Resp: 18  SpO2: 99 %  O2 Device: Nasal cannula     Observation/Palpation  Posture: Fair  Observation: IV, O2, telemetry, polo        AROM RLE (degrees)  RLE AROM: Exceptions  RLE General AROM: hips flex just past 90, knees do not fully extend, ankles WFLs  AROM LLE (degrees)  LLE AROM : Exceptions  LLE General AROM: hips flex just past 90, knees do not fully extend, ankles WFLs  Strength RLE  Strength RLE: Exception  Comment: hips 3-/5, knees 3+/5, ankles 4/5  Strength LLE  Strength LLE: Exception  Comment: hips 3-/5, knees 3+/5, ankles 4/5           Bed mobility  Supine to Sit: Contact guard assistance  Bed Mobility Comments: needed additional v. cues to get feet to floor  Transfers  Sit to Stand: Minimal Assistance;2 Person Assistance  Stand to sit: Minimal Assistance;2 Person Assistance  Comment: posterior lean noted in standing, improved with additonal time spent in standing, but v. and tactile cues needed. Mod A to maintain standing initially. Ambulation  Surface: level tile  Device: Rolling Walker  Other Apparatus: O2  Assistance: Minimal assistance;2 Person assistance; Moderate assistance  Quality of Gait: unsteady, posterior lean, difficulty WS and initiating steps  Gait Deviations: Decreased step height;Decreased step length; Slow [place]  Distance: 2' to chair  Comments: IV, polo, O2, telemetry     Balance  Posture: Good  Sitting - Static: +;Fair  Sitting - Dynamic: Fair;-  Standing - Static: Poor;+  Standing - Dynamic: Poor OutComes Score                                                  AM-PAC Score             Goals  Short Term Goals  Time Frame for Short term goals: 2 wks  Short term goal 1: supine to sit indep  Short term goal 2: sit to stand indep  Short term goal 3: amb. 200' with RW SBA  Short term goal 4: bed to chair SBA  Patient Goals   Patient goals : get to rehab       Education  Patient Education  Education Given To: Patient  Education Provided: Role of Therapy;Plan of Care  Education Provided Comments: use of call light  Education Method: Verbal;Demonstration  Barriers to Learning: Cognition;Lack of Family Support  Education Outcome: Verbalized understanding;Continued education needed      Therapy Time   Individual Concurrent Group Co-treatment   Time In           Time Out           Minutes                   Amol Ruelas PT    Electronically signed by Amol Ruelas PT on 6/14/2022 at 11:26 AM

## 2022-06-14 NOTE — CARE COORDINATION
Pt denies issues with ETOH at this time. Pt declines any resources or treatment services for residential or OP. Still treating pt for withdrawal symptoms and SW following for different consideration when medically stable. CM consult entered for SNF therapy rehab and will need therapy evals for precert if placement is necessary.

## 2022-06-14 NOTE — PROGRESS NOTES
[facility]      Patient:  Abhay Fuentes  YOB: 1955  Date of Service: 6/14/2022  MRN: [id number]   Acct: [de-identified]   Primary Care Physician: No primary care provider on file. Advance Directive: Full Code  Admit Date: 6/12/2022       Hospital Day: 2  Portions of this note have been copied forward, however, changed to reflect the most current clinical status of this patient. CHIEF COMPLAINT shortness of breath    SUBJECTIVE:  Patient anxious for discharge. Reports he worked with therapy and may need rehab before returning home. Denies shortness of breath and chest pain. Denies nausea, vomiting, constipation, and diarrhea. Reports generalized weakness. CUMULATIVE HOSPITAL COURSE:  The patient is a 72-year-old male with past medical history of alcohol abuse, cerebellar stroke, COPD, GERD, hypertension, hyperlipidemia, and CKD who presented to [address] [place] [address] ED with the complaints of shortness of breath. On arrival patient was poor historian and multiple prior ED visits were noted. Patient reported drinking 1 pint of vodka within 24 hours of admission. Otherwise patient had no complaints. Patient was found to be hypotensive in the ED was subsequently admitted to the ICU. Patient required pressors and was gradually weaned off pressors with IV hydration. Patient was then transferred to CCU. PT/OT eval's were completed and indicated patient requiring moderate assistance and will need 24-hour supervision at discharge. Social work discussed with patient and patient willing for short-term rehab before returning home. Patient noted to have pancytopenia with no current complication. D-dimer noted to be elevated and VQ scan obtained. Renal functions remain stable for patient. Patient is on room air and tolerating well. Review of Systems:   Review of Systems   Constitutional: Positive for appetite change and fatigue. Negative for chills and fever.    HENT: Negative for sore throat and trouble swallowing. Respiratory: Negative for chest tightness and shortness of breath. Cardiovascular: Negative for chest pain, palpitations and leg swelling. Gastrointestinal: Negative for abdominal pain, diarrhea and vomiting. Genitourinary: Negative for difficulty urinating, frequency and urgency. Musculoskeletal: Positive for back pain. Skin: Negative for color change and wound. Neurological: Positive for tremors and weakness. Negative for dizziness and headaches. Psychiatric/Behavioral: Negative for agitation and confusion. 14 point review of systems is negative except as specifically addressed above. Objective:   VITALS:  BP (!) 151/96   Pulse 95   Temp 97.2 °F (36.2 °C) (Temporal)   Resp 20   Ht 5' 9\" (1.753 m)   Wt 188 lb (85.3 kg)   SpO2 99%   BMI 27.76 kg/m²   24HR INTAKE/OUTPUT:      Intake/Output Summary (Last 24 hours) at 6/14/2022 1512  Last data filed at 6/14/2022 1437  Gross per 24 hour   Intake 330 ml   Output 950 ml   Net -620 ml       Physical Exam  Vitals reviewed. Constitutional:       Appearance: He is ill-appearing (chronically ill appearing). HENT:      Head: Normocephalic. Mouth/Throat:      Mouth: Mucous membranes are moist.      Pharynx: Oropharynx is clear. Eyes:      Pupils: Pupils are equal, round, and reactive to light. Cardiovascular:      Rate and Rhythm: Regular rhythm. Tachycardia present. Pulses: Normal pulses. Heart sounds: Normal heart sounds. Pulmonary:      Effort: Pulmonary effort is normal.      Breath sounds: Normal breath sounds. Abdominal:      General: Abdomen is flat. Bowel sounds are normal. There is no distension. Palpations: Abdomen is soft. Tenderness: There is no abdominal tenderness. There is no guarding. Musculoskeletal:         General: Normal range of motion. Right lower leg: No edema. Left lower leg: No edema. Skin:     General: Skin is warm and dry.       Capillary Refill: Capillary refill takes less than 2 seconds. Neurological:      Mental Status: He is alert. Comments: Patient oriented to person place. Patient knows that it is summer but was not able to tell me the year. Tremor noted. Medications:      lactated ringers 75 mL/hr at 06/14/22 1148    sodium chloride        sodium bicarbonate  650 mg Oral 4x Daily    chlordiazePOXIDE  5 mg Oral TID    bumetanide  0.5 mg IntraVENous Daily    mirtazapine  3.75 mg Oral Nightly    melatonin  3 mg Oral Nightly    thiamine  100 mg IntraVENous Daily    aspirin  81 mg Oral Daily    atorvastatin  40 mg Oral Daily    clopidogrel  75 mg Oral Daily    lisinopril  20 mg Oral Daily    pantoprazole  40 mg Oral QAM AC    enoxaparin  40 mg SubCUTAneous Nightly    sodium chloride flush  5-40 mL IntraVENous 2 times per day    folic acid  1 mg Oral Daily    Arformoterol Tartrate  15 mcg Nebulization BID    budesonide  0.5 mg Nebulization BID     hydrALAZINE, labetalol, ondansetron, ipratropium-albuterol, sodium chloride flush, sodium chloride, LORazepam **OR** LORazepam **OR** LORazepam **OR** LORazepam **OR** [DISCONTINUED] LORazepam **OR** [DISCONTINUED] LORazepam **OR** [DISCONTINUED] LORazepam **OR** [DISCONTINUED] LORazepam  ADULT DIET;  Regular; Low Fat/Low Chol/High Fiber/2 gm Na     Lab and other Data:     Recent Labs     06/12/22  1408 06/13/22  0151 06/14/22  0104   WBC 10.2 7.4 7.8   HGB 14.4 12.7* 13.7*    131 126*     Recent Labs     06/12/22  1408 06/12/22  1627 06/13/22  0151 06/14/22  0104   *  --  133* 135*   K 4.4 3.7 4.2 4.5   CL 95*  --  100 99   CO2 9*  --  16* 18*   BUN 25*  --  27* 33*   CREATININE 1.8*  --  2.0* 1.9*   GLUCOSE 178*  --  129* 105     Recent Labs     06/12/22  1408 06/13/22  0151 06/14/22  0104   AST 66* 53* 44*   ALT 29 26 23   BILITOT 0.6 0.9 1.1   ALKPHOS 77 68 82     Troponin T:   Recent Labs     06/12/22  1408 06/12/22  1928   TROPONINI 0.05* <0.01     Pro-BNP: No results for (CKD), stage IV (severe) (HCC)   -stable, avoid hypotension and nephrotoxins      Cerebellar stroke ([address]   -noted   -PT/OT recommend 24 hour care   -SW discussed with patient and he would like short term rehab before returning home      Alcohol abuse   -continue CIWA   -patient does not want alcohol rehab at this   -monitor thrombocytopenia      Marcia Rabago, KRYSTIAN - CNP, 6/14/2022 3:12 PM       Attestation Statement     I have independently seen and examined this patient and agree with the asesment and plan by mid level provider                                                           [facility] MEDICINE  - PROGRESS NOTE         Objective:   Vitals: BP (!) 151/96   Pulse 95   Temp 97.2 °F (36.2 °C) (Temporal)   Resp 20   Ht 5' 9\" (1.753 m)   Wt 188 lb (85.3 kg)   SpO2 99%   BMI 27.76 kg/m²   General appearance: alert, appears stated age and cooperative  Skin: Skin color, texture, turgor normal.   HEENT: Head: Normocephalic, no lesions, without obvious abnormality.   Neck: no adenopathy, no carotid bruit, no JVD and supple, symmetrical, trachea midline  Lungs: rales bilaterally  Heart: regular rate and rhythm, S1, S2 normal, no murmur, click, rub or gallop  Abdomen: soft, non-tender; bowel sounds normal; no masses,  no organomegaly  Extremities: extremities normal, atraumatic, no cyanosis or edema  Lymphatic: No significant lymph node enlargement papable  Neurologic: Mental status: Alert, oriented, thought content appropriate      Assessment & Plan:    · Tachycardia/hypotension with lactic acidosis- no sings of infection-- resolved with IVF and IV pressors  · Elevated DD- rule out DVT and PE  · Cannabinoids abuse   · Alcohol abuse- b1, folic, librium, ativan prn   · Alcohol withdrawals - resolved   · Vit D def- replace   · Elevated BNP with pleural effusion-  echo - diuresis   · Hypomagnesemia- overcorrected   · Hyponatremia - improved   · CKD stage 4  · History of CVA  · HL  · GERD  · Deconditioned - pt ot- snf    Suraj Hong MD

## 2022-06-14 NOTE — PLAN OF CARE
Problem: Discharge Planning  Goal: Discharge to home or other facility with appropriate resources  6/13/2022 2310 by Manuel Barreto RN  Outcome: Progressing  Flowsheets (Taken 6/13/2022 1914)  Discharge to home or other facility with appropriate resources: Identify barriers to discharge with patient and caregiver     Problem: Safety - Adult  Goal: Free from fall injury  6/13/2022 2310 by Manuel Barreto RN  Outcome: Progressing     Problem: Skin/Tissue Integrity  Goal: Absence of new skin breakdown  Description: 1. Monitor for areas of redness and/or skin breakdown  2. Assess vascular access sites hourly  3. Every 4-6 hours minimum:  Change oxygen saturation probe site  4. Every 4-6 hours:  If on nasal continuous positive airway pressure, respiratory therapy assess nares and determine need for appliance change or resting period.   6/13/2022 2310 by Manuel Barreto RN  Outcome: Progressing     Problem: Chronic Conditions and Co-morbidities  Goal: Patient's chronic conditions and co-morbidity symptoms are monitored and maintained or improved  6/13/2022 2310 by Manuel Barreto RN  Outcome: Progressing  Flowsheets (Taken 6/13/2022 1914)  Care Plan - Patient's Chronic Conditions and Co-Morbidity Symptoms are Monitored and Maintained or Improved: Monitor and assess patient's chronic conditions and comorbid symptoms for stability, deterioration, or improvement

## 2022-06-15 LAB
ALBUMIN SERPL-MCNC: 3.7 G/DL (ref 3.5–5.2)
ALP BLD-CCNC: 101 U/L (ref 40–130)
ALT SERPL-CCNC: 22 U/L (ref 5–41)
ANION GAP SERPL CALCULATED.3IONS-SCNC: 23 MMOL/L (ref 7–19)
AST SERPL-CCNC: 49 U/L (ref 5–40)
BASOPHILS ABSOLUTE: 0.1 K/UL (ref 0–0.2)
BASOPHILS RELATIVE PERCENT: 0.8 % (ref 0–1)
BILIRUB SERPL-MCNC: 1.7 MG/DL (ref 0.2–1.2)
BUN BLDV-MCNC: 32 MG/DL (ref 8–23)
CALCIUM SERPL-MCNC: 9.6 MG/DL (ref 8.8–10.2)
CHLORIDE BLD-SCNC: 96 MMOL/L (ref 98–111)
CO2: 17 MMOL/L (ref 22–29)
CREAT SERPL-MCNC: 1.7 MG/DL (ref 0.5–1.2)
EOSINOPHILS ABSOLUTE: 0.1 K/UL (ref 0–0.6)
EOSINOPHILS RELATIVE PERCENT: 1.1 % (ref 0–5)
GFR AFRICAN AMERICAN: 49
GFR NON-AFRICAN AMERICAN: 41
GLUCOSE BLD-MCNC: 63 MG/DL (ref 74–109)
HCT VFR BLD CALC: 48 % (ref 42–52)
HEMOGLOBIN: 14.8 G/DL (ref 14–18)
IMMATURE GRANULOCYTES #: 0.1 K/UL
LYMPHOCYTES ABSOLUTE: 1.3 K/UL (ref 1.1–4.5)
LYMPHOCYTES RELATIVE PERCENT: 13.1 % (ref 20–40)
MAGNESIUM: 1.9 MG/DL (ref 1.6–2.4)
MCH RBC QN AUTO: 35.6 PG (ref 27–31)
MCHC RBC AUTO-ENTMCNC: 30.8 G/DL (ref 33–37)
MCV RBC AUTO: 115.4 FL (ref 80–94)
MONOCYTES ABSOLUTE: 0.7 K/UL (ref 0–0.9)
MONOCYTES RELATIVE PERCENT: 7.1 % (ref 0–10)
NEUTROPHILS ABSOLUTE: 7.6 K/UL (ref 1.5–7.5)
NEUTROPHILS RELATIVE PERCENT: 77.1 % (ref 50–65)
PDW BLD-RTO: 14.6 % (ref 11.5–14.5)
PLATELET # BLD: 143 K/UL (ref 130–400)
PMV BLD AUTO: 11.1 FL (ref 9.4–12.4)
POTASSIUM SERPL-SCNC: 4.5 MMOL/L (ref 3.5–5)
RBC # BLD: 4.16 M/UL (ref 4.7–6.1)
SODIUM BLD-SCNC: 136 MMOL/L (ref 136–145)
TOTAL PROTEIN: 6.5 G/DL (ref 6.6–8.7)
WBC # BLD: 9.9 K/UL (ref 4.8–10.8)

## 2022-06-15 PROCEDURE — 6370000000 HC RX 637 (ALT 250 FOR IP): Performed by: NURSE PRACTITIONER

## 2022-06-15 PROCEDURE — 2140000000 HC CCU INTERMEDIATE R&B

## 2022-06-15 PROCEDURE — 6370000000 HC RX 637 (ALT 250 FOR IP): Performed by: HOSPITALIST

## 2022-06-15 PROCEDURE — 6360000002 HC RX W HCPCS: Performed by: INTERNAL MEDICINE

## 2022-06-15 PROCEDURE — 85025 COMPLETE CBC W/AUTO DIFF WBC: CPT

## 2022-06-15 PROCEDURE — 2580000003 HC RX 258: Performed by: INTERNAL MEDICINE

## 2022-06-15 PROCEDURE — 83735 ASSAY OF MAGNESIUM: CPT

## 2022-06-15 PROCEDURE — 94640 AIRWAY INHALATION TREATMENT: CPT

## 2022-06-15 PROCEDURE — 80053 COMPREHEN METABOLIC PANEL: CPT

## 2022-06-15 PROCEDURE — 6370000000 HC RX 637 (ALT 250 FOR IP): Performed by: PSYCHIATRY & NEUROLOGY

## 2022-06-15 PROCEDURE — 6370000000 HC RX 637 (ALT 250 FOR IP): Performed by: INTERNAL MEDICINE

## 2022-06-15 PROCEDURE — 36415 COLL VENOUS BLD VENIPUNCTURE: CPT

## 2022-06-15 PROCEDURE — 6360000002 HC RX W HCPCS: Performed by: EMERGENCY MEDICINE

## 2022-06-15 RX ORDER — GAUZE BANDAGE 2" X 2"
100 BANDAGE TOPICAL DAILY
Status: DISCONTINUED | OUTPATIENT
Start: 2022-06-16 | End: 2022-06-20 | Stop reason: HOSPADM

## 2022-06-15 RX ADMIN — LISINOPRIL 20 MG: 10 TABLET ORAL at 08:34

## 2022-06-15 RX ADMIN — SODIUM BICARBONATE 650 MG: 650 TABLET ORAL at 13:25

## 2022-06-15 RX ADMIN — METOPROLOL TARTRATE 25 MG: 25 TABLET, FILM COATED ORAL at 08:34

## 2022-06-15 RX ADMIN — ATORVASTATIN CALCIUM 40 MG: 40 TABLET, FILM COATED ORAL at 08:34

## 2022-06-15 RX ADMIN — ENOXAPARIN SODIUM 40 MG: 100 INJECTION SUBCUTANEOUS at 21:43

## 2022-06-15 RX ADMIN — SODIUM BICARBONATE 650 MG: 650 TABLET ORAL at 08:34

## 2022-06-15 RX ADMIN — SODIUM BICARBONATE 650 MG: 650 TABLET ORAL at 21:43

## 2022-06-15 RX ADMIN — CHLORDIAZEPOXIDE HYDROCHLORIDE 5 MG: 5 CAPSULE ORAL at 21:43

## 2022-06-15 RX ADMIN — SODIUM CHLORIDE, PRESERVATIVE FREE 10 ML: 5 INJECTION INTRAVENOUS at 08:35

## 2022-06-15 RX ADMIN — ARFORMOTEROL TARTRATE 15 MCG: 15 SOLUTION RESPIRATORY (INHALATION) at 18:36

## 2022-06-15 RX ADMIN — CHLORDIAZEPOXIDE HYDROCHLORIDE 5 MG: 5 CAPSULE ORAL at 13:25

## 2022-06-15 RX ADMIN — CLOPIDOGREL BISULFATE 75 MG: 75 TABLET ORAL at 08:34

## 2022-06-15 RX ADMIN — BUDESONIDE 500 MCG: 0.5 SUSPENSION RESPIRATORY (INHALATION) at 07:00

## 2022-06-15 RX ADMIN — SODIUM BICARBONATE 650 MG: 650 TABLET ORAL at 17:33

## 2022-06-15 RX ADMIN — Medication 3 MG: at 21:45

## 2022-06-15 RX ADMIN — METOPROLOL TARTRATE 25 MG: 25 TABLET, FILM COATED ORAL at 21:43

## 2022-06-15 RX ADMIN — BUDESONIDE 500 MCG: 0.5 SUSPENSION RESPIRATORY (INHALATION) at 18:36

## 2022-06-15 RX ADMIN — FOLIC ACID 1 MG: 1 TABLET ORAL at 08:34

## 2022-06-15 RX ADMIN — ARFORMOTEROL TARTRATE 15 MCG: 15 SOLUTION RESPIRATORY (INHALATION) at 07:00

## 2022-06-15 RX ADMIN — ASPIRIN 81 MG: 81 TABLET, COATED ORAL at 08:34

## 2022-06-15 RX ADMIN — MIRTAZAPINE 3.75 MG: 7.5 TABLET ORAL at 21:43

## 2022-06-15 RX ADMIN — CHLORDIAZEPOXIDE HYDROCHLORIDE 5 MG: 5 CAPSULE ORAL at 08:34

## 2022-06-15 RX ADMIN — THIAMINE HYDROCHLORIDE 100 MG: 100 INJECTION, SOLUTION INTRAMUSCULAR; INTRAVENOUS at 08:33

## 2022-06-15 RX ADMIN — PANTOPRAZOLE SODIUM 40 MG: 40 TABLET, DELAYED RELEASE ORAL at 05:36

## 2022-06-15 ASSESSMENT — ENCOUNTER SYMPTOMS
ABDOMINAL PAIN: 0
TROUBLE SWALLOWING: 0
VOMITING: 0
SHORTNESS OF BREATH: 0
BACK PAIN: 1
DIARRHEA: 0
SORE THROAT: 0
CHEST TIGHTNESS: 0
COLOR CHANGE: 0

## 2022-06-15 ASSESSMENT — PAIN DESCRIPTION - PAIN TYPE: TYPE: CHRONIC PAIN

## 2022-06-15 ASSESSMENT — PAIN DESCRIPTION - ORIENTATION: ORIENTATION: LOWER

## 2022-06-15 ASSESSMENT — PAIN - FUNCTIONAL ASSESSMENT: PAIN_FUNCTIONAL_ASSESSMENT: ACTIVITIES ARE NOT PREVENTED

## 2022-06-15 ASSESSMENT — PAIN SCALES - GENERAL: PAINLEVEL_OUTOF10: 5

## 2022-06-15 ASSESSMENT — PAIN DESCRIPTION - LOCATION: LOCATION: BACK

## 2022-06-15 ASSESSMENT — PAIN DESCRIPTION - DESCRIPTORS: DESCRIPTORS: ACHING

## 2022-06-15 NOTE — PLAN OF CARE
Problem: Discharge Planning  Goal: Discharge to home or other facility with appropriate resources  Outcome: Progressing     Problem: Safety - Adult  Goal: Free from fall injury  Outcome: Progressing     Problem: Skin/Tissue Integrity  Goal: Absence of new skin breakdown  Description: 1. Monitor for areas of redness and/or skin breakdown  2. Assess vascular access sites hourly  3. Every 4-6 hours minimum:  Change oxygen saturation probe site  4. Every 4-6 hours:  If on nasal continuous positive airway pressure, respiratory therapy assess nares and determine need for appliance change or resting period.   Outcome: Progressing     Problem: Chronic Conditions and Co-morbidities  Goal: Patient's chronic conditions and co-morbidity symptoms are monitored and maintained or improved  Outcome: Progressing

## 2022-06-15 NOTE — PROGRESS NOTES
Took oxygen off patient. Stating at mid 80's. Reassessed 30 minutes later and patient's O2 sat was staying around  %.

## 2022-06-15 NOTE — CARE COORDINATION
Confirmed receipt of referral to [facility] and Dr Katy Alvarez to review updated progress notes faxed by TRANG ahn AM.     [facility]  Ph [phone]  Fax [phone]

## 2022-06-15 NOTE — PROGRESS NOTES
[facility]      Patient:  Danielle Mcnair  YOB: 1955  Date of Service: 6/14/2022  MRN: [id number]   Acct: [de-identified]   Primary Care Physician: No primary care provider on file. Advance Directive: Full Code  Admit Date: 6/12/2022       Hospital Day: 2  Portions of this note have been copied forward, however, changed to reflect the most current clinical status of this patient. CHIEF COMPLAINT shortness of breath    SUBJECTIVE:  Patient anxious for discharge. Reports he worked with therapy and may need rehab before returning home. Denies shortness of breath and chest pain. Denies nausea, vomiting, constipation, and diarrhea. Reports generalized weakness. CUMULATIVE HOSPITAL COURSE:  The patient is a 63-year-old male with past medical history of alcohol abuse, cerebellar stroke, COPD, GERD, hypertension, hyperlipidemia, and CKD who presented to [address] [place] [address] ED with the complaints of shortness of breath. On arrival patient was poor historian and multiple prior ED visits were noted. Patient reported drinking 1 pint of vodka within 24 hours of admission. Otherwise patient had no complaints. Patient was found to be hypotensive in the ED was subsequently admitted to the ICU. Patient required pressors and was gradually weaned off pressors with IV hydration. Patient was then transferred to CCU. PT/OT eval's were completed and indicated patient requiring moderate assistance and will need 24-hour supervision at discharge. Social work discussed with patient and patient willing for short-term rehab before returning home. Patient noted to have pancytopenia with no current complication. D-dimer noted to be elevated and VQ scan obtained. Renal functions remain stable for patient. Patient is on room air and tolerating well. Review of Systems:   Review of Systems   Constitutional: Positive for appetite change and fatigue. Negative for chills and fever.    HENT: Negative for sore throat and trouble swallowing. Respiratory: Negative for chest tightness and shortness of breath. Cardiovascular: Negative for chest pain, palpitations and leg swelling. Gastrointestinal: Negative for abdominal pain, diarrhea and vomiting. Genitourinary: Negative for difficulty urinating, frequency and urgency. Musculoskeletal: Positive for back pain. Skin: Negative for color change and wound. Neurological: Positive for tremors and weakness. Negative for dizziness and headaches. Psychiatric/Behavioral: Negative for agitation and confusion. 14 point review of systems is negative except as specifically addressed above. Objective:   VITALS:  BP (!) 166/99   Pulse (!) 103   Temp 97.3 °F (36.3 °C) (Temporal)   Resp 20   Ht 5' 9\" (1.753 m)   Wt 188 lb (85.3 kg)   SpO2 97%   BMI 27.76 kg/m²   24HR INTAKE/OUTPUT:      Intake/Output Summary (Last 24 hours) at 6/14/2022 2046  Last data filed at 6/14/2022 1804  Gross per 24 hour   Intake 450 ml   Output 1250 ml   Net -800 ml       Physical Exam  Vitals reviewed. Constitutional:       Appearance: He is ill-appearing (chronically ill appearing). HENT:      Head: Normocephalic. Mouth/Throat:      Mouth: Mucous membranes are moist.      Pharynx: Oropharynx is clear. Eyes:      Pupils: Pupils are equal, round, and reactive to light. Cardiovascular:      Rate and Rhythm: Regular rhythm. Tachycardia present. Pulses: Normal pulses. Heart sounds: Normal heart sounds. Pulmonary:      Effort: Pulmonary effort is normal.      Breath sounds: Normal breath sounds. Abdominal:      General: Abdomen is flat. Bowel sounds are normal. There is no distension. Palpations: Abdomen is soft. Tenderness: There is no abdominal tenderness. There is no guarding. Musculoskeletal:         General: Normal range of motion. Right lower leg: No edema. Left lower leg: No edema. Skin:     General: Skin is warm and dry.       Capillary Refill: Capillary refill takes less than 2 seconds. Neurological:      Mental Status: He is alert. Comments: Patient oriented to person place. Patient knows that it is summer but was not able to tell me the year. Tremor noted. Medications:      sodium chloride        sodium bicarbonate  650 mg Oral 4x Daily    chlordiazePOXIDE  5 mg Oral TID    vitamin D  50,000 Units Oral Weekly    mirtazapine  3.75 mg Oral Nightly    melatonin  3 mg Oral Nightly    thiamine  100 mg IntraVENous Daily    aspirin  81 mg Oral Daily    atorvastatin  40 mg Oral Daily    clopidogrel  75 mg Oral Daily    lisinopril  20 mg Oral Daily    pantoprazole  40 mg Oral QAM AC    enoxaparin  40 mg SubCUTAneous Nightly    sodium chloride flush  5-40 mL IntraVENous 2 times per day    folic acid  1 mg Oral Daily    Arformoterol Tartrate  15 mcg Nebulization BID    budesonide  0.5 mg Nebulization BID     hydrALAZINE, labetalol, ondansetron, ipratropium-albuterol, sodium chloride flush, sodium chloride, LORazepam **OR** LORazepam **OR** LORazepam **OR** LORazepam **OR** [DISCONTINUED] LORazepam **OR** [DISCONTINUED] LORazepam **OR** [DISCONTINUED] LORazepam **OR** [DISCONTINUED] LORazepam  ADULT DIET; Regular     Lab and other Data:     Recent Labs     06/12/22 1408 06/13/22  0151 06/14/22  0104   WBC 10.2 7.4 7.8   HGB 14.4 12.7* 13.7*    131 126*     Recent Labs     06/12/22  1408 06/12/22  1627 06/13/22  0151 06/14/22  0104   *  --  133* 135*   K 4.4 3.7 4.2 4.5   CL 95*  --  100 99   CO2 9*  --  16* 18*   BUN 25*  --  27* 33*   CREATININE 1.8*  --  2.0* 1.9*   GLUCOSE 178*  --  129* 105     Recent Labs     06/12/22  1408 06/13/22  0151 06/14/22  0104   AST 66* 53* 44*   ALT 29 26 23   BILITOT 0.6 0.9 1.1   ALKPHOS 77 68 82     Troponin T:   Recent Labs     06/12/22  1408 06/12/22 1928   TROPONINI 0.05* <0.01     Pro-BNP: No results for input(s): BNP in the last 72 hours.   INR:   Recent Labs     06/12/22  1620 INR 1.07     UA:  Recent Labs     06/14/22  1410   COLORU YELLOW   PHUR 5.5   WBCUA 1   RBCUA 2   BACTERIA NEGATIVE*   CLARITYU Clear   SPECGRAV 1.015   LEUKOCYTESUR Negative   UROBILINOGEN 1.0   BILIRUBINUR Negative   BLOODU Negative   GLUCOSEU Negative     A1C: No results for input(s): LABA1C in the last 72 hours. ABG:  Recent Labs     06/12/22  1627   PHART 7.290*   YXF6FLC 16.0*   PO2ART 71.0*   XJD7LWQ 7.7*   BEART -16.2*   HGBAE 14.3   A8TONAFW 90.8   CARBOXHGBART 1.9       RAD:   CT Head WO Contrast    Result Date: 6/12/2022  1. No acute intracranial abnormality. 2. Stable moderate cerebral and cerebellar volume loss, moderate periventricular and deep white matter small vessel changes. 3.  Volume loss and gliosis from chronic ischemic event in the left mid and inferior cerebellum are also stable. Recommendation: Follow up as clinically indicated. All CT scans at this facility utilize dose modulation, iterative reconstruction, and/or weight based dosing when appropriate to reduce radiation dose to as low as reasonably achievable. Electronically Signed by Felix De La Rosa MD at 12-Jun-2022 05:35:53 PM             XR CHEST PORTABLE    Result Date: 6/12/2022  Cardiomegaly and vascular congestion and small left pleural effusion. Recommendation: Follow up recommended.  Electronically Signed by Georgina Sanchez MD, MQSA CERTIFIED at [id number] 04:11:44 PM                Echo: pending  Micro: Blood cultures no growth to date    Assessment/Plan   Principal Problem:    Hypotension-resolved   -continue to monitor closely    Active Problems:    Essential hypertension   -resumed home medications      Chronic kidney disease (CKD), stage IV (severe) (HCC)   -stable, avoid hypotension and nephrotoxins      Cerebellar stroke ([facility] [address]   -noted   -PT/OT recommend 24 hour care   -SW discussed with patient and he would like short term rehab before returning home      Alcohol abuse   -continue CIWA   -patient does not want alcohol rehab at this   -monitor thrombocytopenia      Ashwini Wright MD, 6/14/2022 8:46 PM       Attestation Statement     I have independently seen and examined this patient and agree with the asesment and plan by mid level provider                                                           HOSPITAL MEDICINE  - PROGRESS NOTE         Objective:   Vitals: BP (!) 166/99   Pulse (!) 103   Temp 97.3 °F (36.3 °C) (Temporal)   Resp 20   Ht 5' 9\" (1.753 m)   Wt 188 lb (85.3 kg)   SpO2 97%   BMI 27.76 kg/m²   General appearance: alert, appears stated age and cooperative  Skin: Skin color, texture, turgor normal.   HEENT: Head: Normocephalic, no lesions, without obvious abnormality.   Neck: no adenopathy, no carotid bruit, no JVD and supple, symmetrical, trachea midline  Lungs: rales bilaterally  Heart: regular rate and rhythm, S1, S2 normal, no murmur, click, rub or gallop  Abdomen: soft, non-tender; bowel sounds normal; no masses,  no organomegaly  Extremities: extremities normal, atraumatic, no cyanosis or edema  Lymphatic: No significant lymph node enlargement papable  Neurologic: Mental status: Alert, oriented, thought content appropriate      Assessment & Plan:    · Tachycardia/hypotension with lactic acidosis- no sings of infection-- resolved with IVF and IV pressors  · Elevated DD- rule out DVT and PE  · Cannabinoids abuse   · Alcohol abuse- b1, folic, librium, ativan prn   · Alcohol withdrawals - resolved   · Vit D def- replace   · Elevated BNP with pleural effusion-  echo    · Hypomagnesemia- overcorrected   · Hyponatremia - improved   · CKD stage 4  · History of CVA  · HL  · GERD  · Deconditioned - pt ot- snf    Ashwini Wright MD

## 2022-06-15 NOTE — CARE COORDINATION
[facility] physician denied the pt at this time. SW informed the pt and suggested referral to [facility]. Pt is anxious to dc from [facility] but agrees therapy would be helpful and agreed to the referral to [facility]. TRANG attempted to call [facility] with [facility] admissions but mailbox was full and could not leave a message.      Faxed to [email or web address] [phone]  f [phone]

## 2022-06-15 NOTE — PROGRESS NOTES
06/15/22 1000   Subjective   Subjective My back hurts my L  foot don't feel like doing anything now.    Physical Therapy    Electronically signed by Kacey Zhao PTA on 6/15/2022 at 10:06 AM

## 2022-06-15 NOTE — PROGRESS NOTES
Pharmacy Intravenous to Oral Protocol    Medication changed per [facility] IV to PO protocol: thiamine    Patient meets the following inclusion criteria and none of the exclusion criteria:    Inclusion criteria:  - IV therapy > 24 hours (antibiotics only)  - Tolerating diet more advanced than clear liquids  - Tolerating PO medications  - No vasopressor blood pressure support (ie no signs of shock)  - Patient hasn't had a seizure for 72 hrs (antiepileptic medications only)    Exclusion criteria:  - Infections requiring IV therapy (ie meningitis, endocarditis, osteomyelitis, pancreatitis)   - Nausea and/or vomiting or severe diarrhea within past 24 hours   - Has gastrectomy, ileus, gastric outlet or bowel obstruction, or malabsorption syndromes   - Has significant painful oral ulceration   - TPN with an NPO order   - Active GI bleed   - Unable to swallow   - NPO   - Febrile in the last 24 hours (antibiotics only)   - Clinical deteriorating or unstable (antibiotics only)   - Pediatric patients and patients who are not euthyroid (not on oral levothyroxine/not stabilized on oral levothyroxine)- Levothyroxine only     SIVAN HOWE, PHARM D, 6/15/2022, 3:30 PM

## 2022-06-15 NOTE — PROGRESS NOTES
[facility] (formerly [facility])[3]ists      Patient:  Janeth Sam  YOB: 1955  Date of Service: 6/15/2022  MRN: [id number]   Acct: [de-identified]   Primary Care Physician: No primary care provider on file. Advance Directive: Full Code  Admit Date: 6/12/2022       Hospital Day: 3  Portions of this note have been copied forward, however, changed to reflect the most current clinical status of this patient. CHIEF COMPLAINT shortness of breath    SUBJECTIVE: Patient reports he is having more back pain today than normal.  He states he is not having any shortness of breath or chest pain. Patient denies cough or congestion today. CUMULATIVE HOSPITAL COURSE:  The patient is a 63-year-old male with past medical history of alcohol abuse, cerebellar stroke, COPD, GERD, hypertension, hyperlipidemia, and CKD who presented to [address] [place] [address] ED with the complaints of shortness of breath. On arrival patient was poor historian and multiple prior ED visits were noted. Patient reported drinking 1 pint of vodka within 24 hours of admission. Otherwise patient had no complaints. Patient was found to be hypotensive in the ED was subsequently admitted to the ICU. Patient required pressors and was gradually weaned off pressors with IV hydration. Patient was then transferred to PCU. PT/OT eval's were completed and indicated patient requiring moderate assistance and will need 24-hour supervision at discharge. Social work discussed with patient and patient willing for short-term rehab before returning home. D-dimer noted to be elevated and VQ scan with low probibility. Renal functions remain stable for patient. Patient is on room air and tolerating well. Patient noted to have hypotension  Hypotension has been resolved and tachycardia. Low-dose metoprolol added. Social work continues to work on short term rehab placement. Review of Systems:   Review of Systems   Constitutional: Positive for appetite change and fatigue.  Negative for chills and fever. HENT: Negative for sore throat and trouble swallowing. Respiratory: Negative for chest tightness and shortness of breath. Cardiovascular: Negative for chest pain, palpitations and leg swelling. Gastrointestinal: Negative for abdominal pain, diarrhea and vomiting. Genitourinary: Negative for difficulty urinating, frequency and urgency. Musculoskeletal: Positive for back pain. Skin: Negative for color change and wound. Neurological: Positive for tremors and weakness. Negative for dizziness and headaches. Psychiatric/Behavioral: Negative for agitation and confusion. 14 point review of systems is negative except as specifically addressed above. Objective:   VITALS:  BP (!) 162/93   Pulse 95   Temp (!) 96 °F (35.6 °C) (Temporal)   Resp 18   Ht 5' 9\" (1.753 m)   Wt 177 lb (80.3 kg)   SpO2 99%   BMI 26.14 kg/m²   24HR INTAKE/OUTPUT:    Intake/Output Summary (Last 24 hours) at 6/15/2022 1505  Last data filed at 6/15/2022 0515  Gross per 24 hour   Intake 180 ml   Output 700 ml   Net -520 ml       Physical Exam  Vitals reviewed. Constitutional:       Appearance: He is ill-appearing (chronically ill appearing). HENT:      Head: Normocephalic. Mouth/Throat:      Mouth: Mucous membranes are moist.      Pharynx: Oropharynx is clear. Eyes:      Pupils: Pupils are equal, round, and reactive to light. Cardiovascular:      Rate and Rhythm: Regular rhythm. Tachycardia present. Pulses: Normal pulses. Heart sounds: Normal heart sounds. Pulmonary:      Effort: Pulmonary effort is normal.      Breath sounds: Normal breath sounds. Abdominal:      General: Abdomen is flat. Bowel sounds are normal. There is no distension. Palpations: Abdomen is soft. Tenderness: There is no abdominal tenderness. There is no guarding. Musculoskeletal:         General: Normal range of motion. Right lower leg: No edema. Left lower leg: No edema.    Skin: General: Skin is warm and dry. Capillary Refill: Capillary refill takes less than 2 seconds. Neurological:      Mental Status: He is alert. Comments: Patient oriented to person place and year today. Medications:      sodium chloride        metoprolol tartrate  25 mg Oral BID    sodium bicarbonate  650 mg Oral 4x Daily    chlordiazePOXIDE  5 mg Oral TID    vitamin D  50,000 Units Oral Weekly    mirtazapine  3.75 mg Oral Nightly    melatonin  3 mg Oral Nightly    thiamine  100 mg IntraVENous Daily    aspirin  81 mg Oral Daily    atorvastatin  40 mg Oral Daily    clopidogrel  75 mg Oral Daily    lisinopril  20 mg Oral Daily    pantoprazole  40 mg Oral QAM AC    enoxaparin  40 mg SubCUTAneous Nightly    sodium chloride flush  5-40 mL IntraVENous 2 times per day    folic acid  1 mg Oral Daily    Arformoterol Tartrate  15 mcg Nebulization BID    budesonide  0.5 mg Nebulization BID     hydrALAZINE, labetalol, ondansetron, ipratropium-albuterol, sodium chloride flush, sodium chloride, LORazepam **OR** LORazepam **OR** LORazepam **OR** LORazepam **OR** [DISCONTINUED] LORazepam **OR** [DISCONTINUED] LORazepam **OR** [DISCONTINUED] LORazepam **OR** [DISCONTINUED] LORazepam  ADULT DIET; Regular     Lab and other Data:     Recent Labs     06/13/22 0151 06/14/22  0104 06/15/22  0205   WBC 7.4 7.8 9.9   HGB 12.7* 13.7* 14.8    126* 143     Recent Labs     06/13/22 0151 06/14/22  0104 06/15/22  0205   * 135* 136   K 4.2 4.5 4.5    99 96*   CO2 16* 18* 17*   BUN 27* 33* 32*   CREATININE 2.0* 1.9* 1.7*   GLUCOSE 129* 105 63*     Recent Labs     06/13/22  0151 06/14/22  0104 06/15/22  0205   AST 53* 44* 49*   ALT 26 23 22   BILITOT 0.9 1.1 1.7*   ALKPHOS 68 82 101     Troponin T:   Recent Labs     06/12/22 1928   TROPONINI <0.01     Pro-BNP: No results for input(s): BNP in the last 72 hours.   INR:   Recent Labs     06/12/22  1620   INR 1.07     UA:  Recent Labs 06/14/22  1410   COLORU YELLOW   PHUR 5.5   WBCUA 1   RBCUA 2   BACTERIA NEGATIVE*   CLARITYU Clear   SPECGRAV 1.015   LEUKOCYTESUR Negative   UROBILINOGEN 1.0   BILIRUBINUR Negative   BLOODU Negative   GLUCOSEU Negative     A1C: No results for input(s): LABA1C in the last 72 hours. ABG:  Recent Labs     06/12/22  1627   PHART 7.290*   QWA4FQW 16.0*   PO2ART 71.0*   KGS3LAN 7.7*   BEART -16.2*   HGBAE 14.3   Y7ZTZISW 90.8   CARBOXHGBART 1.9       RAD:   CT Head WO Contrast    Result Date: 6/12/2022  1. No acute intracranial abnormality. 2. Stable moderate cerebral and cerebellar volume loss, moderate periventricular and deep white matter small vessel changes. 3.  Volume loss and gliosis from chronic ischemic event in the left mid and inferior cerebellum are also stable. Recommendation: Follow up as clinically indicated. All CT scans at this facility utilize dose modulation, iterative reconstruction, and/or weight based dosing when appropriate to reduce radiation dose to as low as reasonably achievable. Electronically Signed by Tien Otero MD at 12-Jun-2022 05:35:53 PM             NM LUNG SCAN PERFUSION ONLY    Result Date: 6/14/2022  The study appearance is consistent with a low probability for pulmonary embolism. Please note according to the PIOPED criteria a low probability reflects a 0 to 19%chance of a pulmonary embolism. Please correlate clinically. If symptoms persist, consider CT PA gram. RECOMMENDATION: Follow up as clinically indicated. Electronically Signed by Jason Cuevas MD at 14-Jun-2022 07:21:18 PM             XR CHEST PORTABLE    Result Date: 6/12/2022  Cardiomegaly and vascular congestion and small left pleural effusion. Recommendation: Follow up recommended. Electronically Signed by Oscar Mcqueen MD, [address] [facility] at [id number] 04:11:44 PM                Echo:   Summary   Mitral valve leaflets are mildly thickened with preserved leaflet   mobility.    Mild mitral regurgitation is present. Mildly thickened aortic valve leaflets with preserved leaflet mobility. Tricuspid valve is structurally normal.   Mild tricuspid regurgitation with estimated RVSP of 54 mm Hg. Left ventricular ejection fraction is visually estimated at 50%. Mild concentric left ventricular hypertrophy. No regional wall motion abnormalities. Impaired relaxation compatible with diastolic dysfunction. ( reversed E/A   ratio)   Moderately enlarged right ventricle cavity.         Assessment/Plan   Principal Problem:    Hypotension /tachycardia /lactic acidosis   -Hypotension and lactic acidosis resolved with IV fluids and IV pressors   -Begin metoprolol for tachycardia as patient is now hypertensive   -Lactic acidosis has normalized   -Urinalysis unremarkable   -D-dimer elevated, VQ scan unremarkable   -Monitor and replace electrolytes as needed    Active Problems:    Essential hypertension   -Resumed home lisinopril   -Add low-dose metoprolol      Chronic kidney disease (CKD), stage IV (severe) (HCC)   -Monitor BMP   -Daily weight   -Intake and output      Cerebellar stroke ([address]   -PT/OT   -Social work assisting in discharge planning      Alcohol abuse   -Withdrawals have resolved   -Continue low-dose Librium   -B1 and folic acid    DVT Prophylaxis: Lovenox    GI prophylaxis:  Protonix    Lucyann Fraction, APRN - CNP, 6/15/2022 3:05 PM

## 2022-06-16 LAB
ALBUMIN SERPL-MCNC: 3 G/DL (ref 3.5–5.2)
ALP BLD-CCNC: 88 U/L (ref 40–130)
ALT SERPL-CCNC: 17 U/L (ref 5–41)
ANION GAP SERPL CALCULATED.3IONS-SCNC: 18 MMOL/L (ref 7–19)
AST SERPL-CCNC: 34 U/L (ref 5–40)
BASOPHILS ABSOLUTE: 0.1 K/UL (ref 0–0.2)
BASOPHILS RELATIVE PERCENT: 0.9 % (ref 0–1)
BILIRUB SERPL-MCNC: 1 MG/DL (ref 0.2–1.2)
BUN BLDV-MCNC: 47 MG/DL (ref 8–23)
CALCIUM SERPL-MCNC: 8.8 MG/DL (ref 8.8–10.2)
CHLORIDE BLD-SCNC: 101 MMOL/L (ref 98–111)
CO2: 19 MMOL/L (ref 22–29)
CREAT SERPL-MCNC: 2 MG/DL (ref 0.5–1.2)
EOSINOPHILS ABSOLUTE: 0.1 K/UL (ref 0–0.6)
EOSINOPHILS RELATIVE PERCENT: 2.1 % (ref 0–5)
GFR AFRICAN AMERICAN: 41
GFR NON-AFRICAN AMERICAN: 34
GLUCOSE BLD-MCNC: 132 MG/DL (ref 74–109)
HCT VFR BLD CALC: 38.9 % (ref 42–52)
HEMOGLOBIN: 12.7 G/DL (ref 14–18)
IMMATURE GRANULOCYTES #: 0 K/UL
LYMPHOCYTES ABSOLUTE: 1.2 K/UL (ref 1.1–4.5)
LYMPHOCYTES RELATIVE PERCENT: 17.6 % (ref 20–40)
MAGNESIUM: 1.8 MG/DL (ref 1.6–2.4)
MCH RBC QN AUTO: 34.6 PG (ref 27–31)
MCHC RBC AUTO-ENTMCNC: 32.6 G/DL (ref 33–37)
MCV RBC AUTO: 106 FL (ref 80–94)
MONOCYTES ABSOLUTE: 0.6 K/UL (ref 0–0.9)
MONOCYTES RELATIVE PERCENT: 8.9 % (ref 0–10)
NEUTROPHILS ABSOLUTE: 4.7 K/UL (ref 1.5–7.5)
NEUTROPHILS RELATIVE PERCENT: 69.9 % (ref 50–65)
PDW BLD-RTO: 14.5 % (ref 11.5–14.5)
PLATELET # BLD: 146 K/UL (ref 130–400)
PMV BLD AUTO: 10.9 FL (ref 9.4–12.4)
POTASSIUM SERPL-SCNC: 3.7 MMOL/L (ref 3.5–5)
RBC # BLD: 3.67 M/UL (ref 4.7–6.1)
SODIUM BLD-SCNC: 138 MMOL/L (ref 136–145)
TOTAL PROTEIN: 5.3 G/DL (ref 6.6–8.7)
WBC # BLD: 6.8 K/UL (ref 4.8–10.8)

## 2022-06-16 PROCEDURE — 83735 ASSAY OF MAGNESIUM: CPT

## 2022-06-16 PROCEDURE — 6370000000 HC RX 637 (ALT 250 FOR IP): Performed by: HOSPITALIST

## 2022-06-16 PROCEDURE — 97530 THERAPEUTIC ACTIVITIES: CPT

## 2022-06-16 PROCEDURE — 6370000000 HC RX 637 (ALT 250 FOR IP): Performed by: PSYCHIATRY & NEUROLOGY

## 2022-06-16 PROCEDURE — 6360000002 HC RX W HCPCS: Performed by: INTERNAL MEDICINE

## 2022-06-16 PROCEDURE — 6370000000 HC RX 637 (ALT 250 FOR IP): Performed by: INTERNAL MEDICINE

## 2022-06-16 PROCEDURE — 2140000000 HC CCU INTERMEDIATE R&B

## 2022-06-16 PROCEDURE — 80053 COMPREHEN METABOLIC PANEL: CPT

## 2022-06-16 PROCEDURE — 97535 SELF CARE MNGMENT TRAINING: CPT

## 2022-06-16 PROCEDURE — 6370000000 HC RX 637 (ALT 250 FOR IP): Performed by: EMERGENCY MEDICINE

## 2022-06-16 PROCEDURE — 2580000003 HC RX 258: Performed by: INTERNAL MEDICINE

## 2022-06-16 PROCEDURE — 92522 EVALUATE SPEECH PRODUCTION: CPT

## 2022-06-16 PROCEDURE — 2700000000 HC OXYGEN THERAPY PER DAY

## 2022-06-16 PROCEDURE — 36415 COLL VENOUS BLD VENIPUNCTURE: CPT

## 2022-06-16 PROCEDURE — 94640 AIRWAY INHALATION TREATMENT: CPT

## 2022-06-16 PROCEDURE — 92610 EVALUATE SWALLOWING FUNCTION: CPT

## 2022-06-16 PROCEDURE — 6370000000 HC RX 637 (ALT 250 FOR IP): Performed by: NURSE PRACTITIONER

## 2022-06-16 PROCEDURE — 85025 COMPLETE CBC W/AUTO DIFF WBC: CPT

## 2022-06-16 RX ORDER — ACETAMINOPHEN 325 MG/1
650 TABLET ORAL EVERY 4 HOURS PRN
Status: DISCONTINUED | OUTPATIENT
Start: 2022-06-16 | End: 2022-06-20 | Stop reason: HOSPADM

## 2022-06-16 RX ADMIN — THIAMINE HCL TAB 100 MG 100 MG: 100 TAB at 08:13

## 2022-06-16 RX ADMIN — SODIUM BICARBONATE 650 MG: 650 TABLET ORAL at 13:22

## 2022-06-16 RX ADMIN — ARFORMOTEROL TARTRATE 15 MCG: 15 SOLUTION RESPIRATORY (INHALATION) at 18:28

## 2022-06-16 RX ADMIN — ASPIRIN 81 MG: 81 TABLET, COATED ORAL at 07:57

## 2022-06-16 RX ADMIN — SODIUM CHLORIDE, PRESERVATIVE FREE 10 ML: 5 INJECTION INTRAVENOUS at 07:58

## 2022-06-16 RX ADMIN — MIRTAZAPINE 3.75 MG: 7.5 TABLET ORAL at 19:52

## 2022-06-16 RX ADMIN — METOPROLOL 12.5 MG: 25 TABLET ORAL at 19:52

## 2022-06-16 RX ADMIN — SODIUM BICARBONATE 650 MG: 650 TABLET ORAL at 19:52

## 2022-06-16 RX ADMIN — ATORVASTATIN CALCIUM 40 MG: 40 TABLET, FILM COATED ORAL at 07:57

## 2022-06-16 RX ADMIN — BUDESONIDE 500 MCG: 0.5 SUSPENSION RESPIRATORY (INHALATION) at 06:20

## 2022-06-16 RX ADMIN — ARFORMOTEROL TARTRATE 15 MCG: 15 SOLUTION RESPIRATORY (INHALATION) at 06:20

## 2022-06-16 RX ADMIN — CLOPIDOGREL BISULFATE 75 MG: 75 TABLET ORAL at 07:57

## 2022-06-16 RX ADMIN — ENOXAPARIN SODIUM 40 MG: 100 INJECTION SUBCUTANEOUS at 19:53

## 2022-06-16 RX ADMIN — LISINOPRIL 20 MG: 10 TABLET ORAL at 07:57

## 2022-06-16 RX ADMIN — CHLORDIAZEPOXIDE HYDROCHLORIDE 5 MG: 5 CAPSULE ORAL at 07:57

## 2022-06-16 RX ADMIN — FOLIC ACID 1 MG: 1 TABLET ORAL at 07:57

## 2022-06-16 RX ADMIN — BUDESONIDE 500 MCG: 0.5 SUSPENSION RESPIRATORY (INHALATION) at 18:28

## 2022-06-16 RX ADMIN — METOPROLOL TARTRATE 25 MG: 25 TABLET, FILM COATED ORAL at 07:58

## 2022-06-16 RX ADMIN — PANTOPRAZOLE SODIUM 40 MG: 40 TABLET, DELAYED RELEASE ORAL at 07:57

## 2022-06-16 RX ADMIN — Medication 3 MG: at 19:52

## 2022-06-16 RX ADMIN — SODIUM BICARBONATE 650 MG: 650 TABLET ORAL at 17:30

## 2022-06-16 RX ADMIN — ACETAMINOPHEN 650 MG: 325 TABLET ORAL at 11:55

## 2022-06-16 RX ADMIN — SODIUM BICARBONATE 650 MG: 650 TABLET ORAL at 07:57

## 2022-06-16 RX ADMIN — ACETAMINOPHEN 650 MG: 325 TABLET ORAL at 17:30

## 2022-06-16 ASSESSMENT — PAIN DESCRIPTION - ORIENTATION
ORIENTATION: LOWER
ORIENTATION: RIGHT;LEFT
ORIENTATION: LOWER
ORIENTATION: LOWER

## 2022-06-16 ASSESSMENT — PAIN - FUNCTIONAL ASSESSMENT
PAIN_FUNCTIONAL_ASSESSMENT: ACTIVITIES ARE NOT PREVENTED
PAIN_FUNCTIONAL_ASSESSMENT: ACTIVITIES ARE NOT PREVENTED

## 2022-06-16 ASSESSMENT — PAIN SCALES - GENERAL
PAINLEVEL_OUTOF10: 0
PAINLEVEL_OUTOF10: 8
PAINLEVEL_OUTOF10: 8
PAINLEVEL_OUTOF10: 7
PAINLEVEL_OUTOF10: 7

## 2022-06-16 ASSESSMENT — ENCOUNTER SYMPTOMS
DIARRHEA: 0
NAUSEA: 0
COUGH: 0
CONSTIPATION: 0
CHEST TIGHTNESS: 0
SORE THROAT: 0
VOMITING: 0
TROUBLE SWALLOWING: 0
ABDOMINAL DISTENTION: 0
COLOR CHANGE: 0
ABDOMINAL PAIN: 0
BLOOD IN STOOL: 0
SHORTNESS OF BREATH: 1
BACK PAIN: 1
WHEEZING: 0

## 2022-06-16 ASSESSMENT — PAIN DESCRIPTION - LOCATION
LOCATION: BACK
LOCATION: FOOT
LOCATION: BACK
LOCATION: BACK

## 2022-06-16 ASSESSMENT — PAIN DESCRIPTION - DESCRIPTORS
DESCRIPTORS: ACHING
DESCRIPTORS: ACHING;DISCOMFORT;PINS AND NEEDLES
DESCRIPTORS: ACHING
DESCRIPTORS: ACHING

## 2022-06-16 ASSESSMENT — PAIN DESCRIPTION - FREQUENCY: FREQUENCY: CONTINUOUS

## 2022-06-16 ASSESSMENT — PAIN DESCRIPTION - PAIN TYPE: TYPE: CHRONIC PAIN

## 2022-06-16 NOTE — PROGRESS NOTES
Speech Language Pathology  Facility/Department: [address] CARE   CLINICAL BEDSIDE SWALLOW EVALUATION  SPEECH PRODUCTION EVALUATION     NAME: Nehemiah Escalera  :   MRN: [id number]    ADMISSION DATE: 2022  ADMITTING DIAGNOSIS: has Essential hypertension; Fe deficiency anemia; Hypertriglyceridemia without hypercholesterolemia; History of gastric ulcer; Chronic kidney disease (CKD), stage IV (severe) ([address] Cerebellar stroke ([address] TIA (transient ischemic attack); Nonintractable headache; Transient cerebral ischemia; HCAP (healthcare-associated pneumonia); H/O ETOH abuse; Sepsis ([address] Pneumonia due to organism; Iron deficiency anemia; Abnormal CT scan; Gastroesophageal reflux disease; Acute renal failure ([address] Syncope and collapse; Polysubstance abuse ([address] Alcohol abuse; Alcohol withdrawal syndrome without complication ([address] Alcohol abuse; Other chest pain; Epigastric pain; Dizziness; and Hypotension on their problem list.    Date of Eval: 2022  Evaluating Therapist: MICHAEL Chavis    Current Diet level:  Regular solid consistency with thin liquids    Primary Complaint  Patient reported frequent coughing with thin liquids    Reason for Referral  Nehemiah Escalera was referred for a bedside swallow evaluation to assess the efficiency of his swallow function, identify signs and symptoms of aspiration and make recommendations regarding safe dietary consistencies, effective compensatory strategies, and safe eating environment. Impression  Assessed patient's swallowing function. Patient exhibited slow and decreased oral prep of more solid consistencies, inconsistently fast oral transit and suspected swallow delay with even thickened liquid consistencies, and sluggish, inconsistently mildly decreased laryngeal elevation for swallow airway protection.  Even so, no outward S/S penetration/aspiration was noted with any ice chip trial, puree consistency trial, regular solid consistency trial, mildly thick/nectar thick liquid trial, or thin H2O trial presented during evaluation this date. At this time, would recommend regular solid consistency but with mildly thick/nectar thick liquids. Recommend meds whole in pudding/applesauce. If patient receives mouth care prior to intake, okay for ice chips and small sips thin H2O IN BETWEEN MEALS for comfort. Will continue to follow. Thank you for this consult. Treatment Plan  Requires SLP Intervention: Yes     Recommended Diet and Intervention  Diet Solids Recommendation: Regular solid  Liquid Consistency Recommendation: Mildly thick (nectar)  Recommended Form of Meds: Meds whole in puree as able  Therapeutic Interventions: Patient/Family education;Diet tolerance monitoring; Therapeutic PO trials with SLP     Compensatory Swallowing Strategies  Compensatory Swallowing Strategies: Upright as possible for all oral intake;Small bites/sips;Eat/Feed slowly; Alternate solids and liquids; Remain upright for 30-45 minutes after meals     Treatment/Goals  Timeframe for Short-term Goals: 1-2x/day for 3 days   Goal 1: Patient will tolerate regular solid consistency and mildly thick/nectar thick liquids with min S/S penetration/aspiration during PO intake. Goal 2: Patient staff will follow swallow safety recommendations to decrease risk of penetration/aspiration during PO intake. Goal 3: Re-assessment of swallow function for potential diet upgrade. Goal 4: Patient will complete oral motor, lingual, and pharyngeal exercises with provision of mod cues/prompts. Goal 5: Monitor speech production. General  Chart Reviewed: Yes  Behavior/Cognition: Alert; Cooperative  O2 Device: None (Room air)  Communication Observation: (Assessed patient's speech production. Patient exhibited slowed lingual movements during verbalizations.  SLP ranked functional intelligibility of speech for unfamiliar listeners at % in utterances with background noise present.)  Follows Directions: Simple   Dentition: Upper dentures   Patient Positioning: Upright in bed  Consistencies Administered: Ice chips;Dysphagia Pureed (Dysphagia I); Regular solid; Nectar - cup; Thin - cup      Oral Motor Examination   Labial ROM: (Decreased, on the right, during labial protrusion trials.)  Labial Strength: (Adequate during labial compression trials.)  Labial Coordination: (Adequate movements were noted.)  Lingual ROM: (Adequate during lingual extension trials with full point achieved; adequate during lingual elevation trials without use of accessory jaw movement; adequate movements noted bilaterally.)  Lingual Symmetry: (Deviation was noted, to the left, during lingual extension trials.)  Lingual Strength: (Decreased)  Lingual Coordination: (Slowed movements were noted.)     Assessed patient's swallowing function with the following observations noted:     Oral Phase  Mastication: Ice chips;Regular solid (Patient exhibited slow vertical jaw movement at the front of the mouth during oral prep of ice chip trials and regular solid consistency trials presented by SLP.)  Suspected Premature Bolus Loss: Nectar - cup; Thin - cup (Patient exhibited inconsistently fast oral transit of nectar thick liquid trials and thin H2O trials presented independently via cup.)  Oral Phase - Comment: Oral transit of ice chip trials primarily measured 1-2 seconds in length. Oral transit of puree consistency trials, presented by SLP, primarily measured 1-2 seconds in length and no oral cavity residue was noted post swallows. Oral transit of regular solid consistency trials primarily measured 1-2 seconds in length and min oral cavity residue was observed post swallows; residue cleared from the mouth with additional dry swallows. Pharyngeal Phase  Suspected Swallow Delay: Nectar - cup; Thin - cup (Suspect secondary to oral transit times.)  Laryngeal Elevation: (Patient exhibited sluggish, inconsistently mildly decreased laryngeal elevation for swallow airway protection.)  Pharyngeal Phase - Comment: No outward S/S penetration/aspiration was noted with any ice chip trial, puree consistency trial, regular solid consistency trial, mildly thick/nectar thick liquid trial, or thin H2O trial presented during assessment this date. At this time, would recommend regular solid consistency but with mildly thick/nectar thick liquids. Recommend meds whole in pudding/applesauce. If patient receives mouth care prior to intake, okay for ice chips and small sips thin H2O IN BETWEEN MEALS for comfort. Will continue to follow.      Electronically signed by MICHAEL Ventura on 6/16/2022 at 12:07 PM

## 2022-06-16 NOTE — PROGRESS NOTES
Occupational Therapy     06/16/22 1400   Subjective   Subjective Pt in bed upon arrival for therapy. Pt agreeable to participate. Pain Assessment   Pain Assessment 0-10   Pain Level 7   Pain Location Foot   Pain Orientation Right;Left   Pain Descriptors Aching;Discomfort;Pins and needles   Functional Pain Assessment Activities are not prevented   Pain Frequency Continuous   Response to Pain Intervention Patient satisfied   Orientation   Overall Orientation Status WFL   Bed Mobility Training   Bed Mobility Training Yes   Overall Level of Assistance Minimum assistance;Contact-guard assistance   Interventions Verbal cues; Tactile cues   Supine to Sit Minimum assistance;Contact-guard assistance   Scooting Minimum assistance;Contact-guard assistance   Transfer Training   Transfer Training Yes   Overall Level of Assistance Minimum assistance;Contact-guard assistance   Interventions Verbal cues; Tactile cues   Sit to Stand Minimum assistance;Contact-guard assistance   Stand to Sit Minimum assistance;Contact-guard assistance   Bed to Chair Minimum assistance;Contact-guard assistance   Toilet Transfer Minimum assistance;Contact-guard assistance   Balance   Sitting Intact   Standing With support   ADL   Feeding Supervision   Grooming Supervision   UE Bathing Minimal assistance   UE Bathing Skilled Clinical Factors seated    LE Bathing Minimal assistance   LE Bathing Skilled Clinical Factors seated   UE Dressing Supervision   LE Dressing Minimal assistance   Toileting Minimal assistance   Assessment   Assessment Tx focused on sitting EOB, STS mobility at RW level, toileting task at RW level with Min-CGA.  Pt able to perform hand hygiene at sink and walk to his recliner at RW level with Min assist.    Activity Tolerance Patient limited by fatigue;Patient limited by endurance   Discharge Recommendations Patient would benefit from continued therapy after discharge   Plan   Times per Week 3-5   Times per Day Daily   OT Plan of Care   Thursday X   Electronically signed by MYRANDA Malin on 6/16/2022 at 3:19 PM

## 2022-06-16 NOTE — CARE COORDINATION
[facility] reports staffing shortage and unable to accept any new referrals at this time. Spoke with pt who confirms unable to manage his own care needs at home alone and agreeable to other SNF referrals. Pt [place] and SW suggested [facility] as next closest SNF and pt agreed.  [facility] faxed the referral to   [address]: [phone]  F: [phone]

## 2022-06-16 NOTE — PLAN OF CARE
Problem: Discharge Planning  Goal: Discharge to home or other facility with appropriate resources  Outcome: Progressing     Problem: Safety - Adult  Goal: Free from fall injury  Outcome: Progressing     Problem: Skin/Tissue Integrity  Goal: Absence of new skin breakdown  Description: 1. Monitor for areas of redness and/or skin breakdown  2. Assess vascular access sites hourly  3. Every 4-6 hours minimum:  Change oxygen saturation probe site  4. Every 4-6 hours:  If on nasal continuous positive airway pressure, respiratory therapy assess nares and determine need for appliance change or resting period.   Outcome: Progressing     Problem: Chronic Conditions and Co-morbidities  Goal: Patient's chronic conditions and co-morbidity symptoms are monitored and maintained or improved  Outcome: Progressing     Problem: Pain  Goal: Verbalizes/displays adequate comfort level or baseline comfort level  Outcome: Progressing

## 2022-06-16 NOTE — PROGRESS NOTES
[facility] Hospital[facility]      Patient:  Kiko Bustamante  YOB: 1955  Date of Service: 6/16/2022  MRN: [id number]   Acct: [de-identified]   Primary Care Physician: No primary care provider on file. Advance Directive: Full Code  Admit Date: 6/12/2022       Hospital Day: 4  Portions of this note have been copied forward, however, changed to reflect the most current clinical status of this patient. CHIEF COMPLAINT shortness of breath    SUBJECTIVE: Mr. Trace Sierra was resting in bed this morning. Reports not feeling well this morning. Reports SOB, chest pain and back pain. Denies fever or chills. CUMULATIVE HOSPITAL COURSE:  The patient is a 72-year-old male with past medical history of alcohol abuse, cerebellar stroke, COPD, GERD, hypertension, hyperlipidemia, and CKD who presented to [facility] ED with the complaints of shortness of breath. On arrival patient was poor historian and multiple prior ED visits were noted. Patient reported drinking 1 pint of vodka within 24 hours of admission. Otherwise patient had no complaints. Patient was found to be hypotensive in the ED was subsequently admitted to the ICU. Patient required pressors and was gradually weaned off pressors with IV hydration. Patient was then transferred to PCU. PT/OT eval's were completed and indicated patient requiring moderate assistance and will need 24-hour supervision at discharge. Social work discussed with patient and patient willing for short-term rehab before returning home. D-dimer noted to be elevated and VQ scan with low probibility. Renal functions remained stable for patient. Patient is on room air and tolerating well. Patient noted to have hypotension. Hypotension has been resolved and tachycardia. Low-dose metoprolol added. Social work continues to work on short term rehab placement. Review of Systems:   Review of Systems   Constitutional: Positive for appetite change and fatigue.  Negative for chills, diaphoresis and fever.   HENT: Negative for congestion, ear pain, sore throat and trouble swallowing. Eyes: Negative for visual disturbance. Respiratory: Positive for shortness of breath. Negative for cough, chest tightness and wheezing. Cardiovascular: Positive for chest pain. Negative for palpitations and leg swelling. Gastrointestinal: Negative for abdominal distention, abdominal pain, blood in stool, constipation, diarrhea, nausea and vomiting. Endocrine: Negative for cold intolerance and heat intolerance. Genitourinary: Negative for difficulty urinating, flank pain, frequency and urgency. Musculoskeletal: Positive for back pain. Negative for arthralgias and myalgias. Skin: Negative for color change and wound. Neurological: Positive for tremors and weakness. Negative for dizziness, syncope, light-headedness, numbness and headaches. Hematological: Does not bruise/bleed easily. Psychiatric/Behavioral: Negative for agitation, confusion and dysphoric mood. Objective:   VITALS:  BP (!) 100/54   Pulse 81   Temp 97.3 °F (36.3 °C) (Temporal)   Resp 16   Ht 5' 9\" (1.753 m)   Wt 178 lb 1.6 oz (80.8 kg)   SpO2 96%   BMI 26.30 kg/m²   24HR INTAKE/OUTPUT:      Intake/Output Summary (Last 24 hours) at 6/16/2022 1125  Last data filed at 6/16/2022 0814  Gross per 24 hour   Intake 360 ml   Output 250 ml   Net 110 ml       Physical Exam  Vitals reviewed. Constitutional:       General: He is not in acute distress. Appearance: Normal appearance. He is ill-appearing (chronically ill appearing). HENT:      Head: Normocephalic and atraumatic. Right Ear: External ear normal.      Left Ear: External ear normal.      Nose: Nose normal.      Mouth/Throat:      Mouth: Mucous membranes are moist.      Pharynx: Oropharynx is clear. Eyes:      Extraocular Movements: Extraocular movements intact. Conjunctiva/sclera: Conjunctivae normal.      Pupils: Pupils are equal, round, and reactive to light. Cardiovascular:      Rate and Rhythm: Normal rate and regular rhythm. Pulses: Normal pulses. Heart sounds: Normal heart sounds. Pulmonary:      Effort: Pulmonary effort is normal. No respiratory distress. Breath sounds: Normal breath sounds. No wheezing, rhonchi or rales. Abdominal:      General: Abdomen is flat. Bowel sounds are normal. There is no distension. Palpations: Abdomen is soft. Tenderness: There is no abdominal tenderness. There is no guarding. Musculoskeletal:         General: No swelling, tenderness or deformity. Normal range of motion. Cervical back: Normal range of motion and neck supple. No muscular tenderness. Right lower leg: No edema. Left lower leg: No edema. Skin:     General: Skin is warm and dry. Capillary Refill: Capillary refill takes less than 2 seconds. Findings: No bruising or lesion. Neurological:      Mental Status: He is alert and oriented to person, place, and time. Comments: Patient oriented to person place and year today. Psychiatric:         Mood and Affect: Mood normal.         Behavior: Behavior normal.         Thought Content:  Thought content normal.         Medications:      sodium chloride        metoprolol tartrate  25 mg Oral BID    thiamine mononitrate  100 mg Oral Daily    sodium bicarbonate  650 mg Oral 4x Daily    vitamin D  50,000 Units Oral Weekly    mirtazapine  3.75 mg Oral Nightly    melatonin  3 mg Oral Nightly    aspirin  81 mg Oral Daily    atorvastatin  40 mg Oral Daily    clopidogrel  75 mg Oral Daily    pantoprazole  40 mg Oral QAM AC    enoxaparin  40 mg SubCUTAneous Nightly    sodium chloride flush  5-40 mL IntraVENous 2 times per day    folic acid  1 mg Oral Daily    Arformoterol Tartrate  15 mcg Nebulization BID    budesonide  0.5 mg Nebulization BID     acetaminophen, hydrALAZINE, labetalol, ondansetron, ipratropium-albuterol, sodium chloride flush, sodium 6/12/2022    Cardiomegaly and vascular congestion and small left pleural effusion. Recommendation: Follow up recommended. Electronically Signed by Ale Ramirez MD, [address] [facility] at [id number] 04:11:44 PM                 Echo:     Summary   Mitral valve leaflets are mildly thickened with preserved leaflet   mobility. Mild mitral regurgitation is present. Mildly thickened aortic valve leaflets with preserved leaflet mobility. Tricuspid valve is structurally normal.   Mild tricuspid regurgitation with estimated RVSP of 54 mm Hg. Left ventricular ejection fraction is visually estimated at 50%. Mild concentric left ventricular hypertrophy. No regional wall motion abnormalities. Impaired relaxation compatible with diastolic dysfunction. ( reversed E/A   ratio)   Moderately enlarged right ventricle cavity. Assessment/Plan     Principal Problem:    Hypotension  Active Problems:    Essential hypertension    Chronic kidney disease (CKD), stage IV (severe) ([facility])    Cerebellar stroke ([facility])    Alcohol abuse  Resolved Problems:    * No resolved hospital problems.  *    Principal Problem:    Hypotension/ Tachycardia/ lactic acidosis -   - Decreased Metoprolol to 12.5 mg BID   - Monitor BP closely    - Lactic acidosis resolved     Active Problems:    Essential hypertension-     - Continue home Lisinopril    - reduced Metoprolol to 12.5mg BID       Chronic kidney disease (CKD), stage IV (severe) ([facility]-    - Monitor labs closely   - Monitor I's and O's closely     - Avoid offending agents       Cerebellar stroke ([facility]-    - PT/OT    - Social service assisting in discharge planning       Alcohol abuse-    - Withdrawals have resolved    - continue low-dose librium    - continue B1 and folic acid         DVT Prophylaxis: Lovenox    GI prophylaxis:  Protonix    Discharge Planning: Social service working on [facility]       KRYSTIAN Brambila CNP, 6/16/2022 11:25 AM

## 2022-06-17 LAB
ALBUMIN SERPL-MCNC: 3.1 G/DL (ref 3.5–5.2)
ALP BLD-CCNC: 84 U/L (ref 40–130)
ALT SERPL-CCNC: 17 U/L (ref 5–41)
ANION GAP SERPL CALCULATED.3IONS-SCNC: 14 MMOL/L (ref 7–19)
AST SERPL-CCNC: 39 U/L (ref 5–40)
BASOPHILS ABSOLUTE: 0 K/UL (ref 0–0.2)
BASOPHILS RELATIVE PERCENT: 0.7 % (ref 0–1)
BILIRUB SERPL-MCNC: 0.8 MG/DL (ref 0.2–1.2)
BLOOD CULTURE, ROUTINE: NORMAL
BUN BLDV-MCNC: 50 MG/DL (ref 8–23)
CALCIUM SERPL-MCNC: 8.8 MG/DL (ref 8.8–10.2)
CHLORIDE BLD-SCNC: 104 MMOL/L (ref 98–111)
CO2: 24 MMOL/L (ref 22–29)
CREAT SERPL-MCNC: 2 MG/DL (ref 0.5–1.2)
CULTURE, BLOOD 2: NORMAL
EOSINOPHILS ABSOLUTE: 0.1 K/UL (ref 0–0.6)
EOSINOPHILS RELATIVE PERCENT: 2.4 % (ref 0–5)
GFR AFRICAN AMERICAN: 41
GFR NON-AFRICAN AMERICAN: 34
GLUCOSE BLD-MCNC: 123 MG/DL (ref 74–109)
HCT VFR BLD CALC: 38.6 % (ref 42–52)
HEMOGLOBIN: 12.3 G/DL (ref 14–18)
IMMATURE GRANULOCYTES #: 0 K/UL
LYMPHOCYTES ABSOLUTE: 1.1 K/UL (ref 1.1–4.5)
LYMPHOCYTES RELATIVE PERCENT: 18.3 % (ref 20–40)
MAGNESIUM: 1.8 MG/DL (ref 1.6–2.4)
MCH RBC QN AUTO: 35.3 PG (ref 27–31)
MCHC RBC AUTO-ENTMCNC: 31.9 G/DL (ref 33–37)
MCV RBC AUTO: 110.9 FL (ref 80–94)
MONOCYTES ABSOLUTE: 0.5 K/UL (ref 0–0.9)
MONOCYTES RELATIVE PERCENT: 7.8 % (ref 0–10)
NEUTROPHILS ABSOLUTE: 4.1 K/UL (ref 1.5–7.5)
NEUTROPHILS RELATIVE PERCENT: 70.1 % (ref 50–65)
PDW BLD-RTO: 14.6 % (ref 11.5–14.5)
PLATELET # BLD: 160 K/UL (ref 130–400)
PMV BLD AUTO: 11.2 FL (ref 9.4–12.4)
POTASSIUM SERPL-SCNC: 3.7 MMOL/L (ref 3.5–5)
RBC # BLD: 3.48 M/UL (ref 4.7–6.1)
SODIUM BLD-SCNC: 142 MMOL/L (ref 136–145)
TOTAL PROTEIN: 5.5 G/DL (ref 6.6–8.7)
WBC # BLD: 5.9 K/UL (ref 4.8–10.8)

## 2022-06-17 PROCEDURE — 6370000000 HC RX 637 (ALT 250 FOR IP): Performed by: NURSE PRACTITIONER

## 2022-06-17 PROCEDURE — 6370000000 HC RX 637 (ALT 250 FOR IP): Performed by: EMERGENCY MEDICINE

## 2022-06-17 PROCEDURE — 92526 ORAL FUNCTION THERAPY: CPT

## 2022-06-17 PROCEDURE — 85025 COMPLETE CBC W/AUTO DIFF WBC: CPT

## 2022-06-17 PROCEDURE — 2140000000 HC CCU INTERMEDIATE R&B

## 2022-06-17 PROCEDURE — 97530 THERAPEUTIC ACTIVITIES: CPT

## 2022-06-17 PROCEDURE — 92507 TX SP LANG VOICE COMM INDIV: CPT

## 2022-06-17 PROCEDURE — 6370000000 HC RX 637 (ALT 250 FOR IP): Performed by: PSYCHIATRY & NEUROLOGY

## 2022-06-17 PROCEDURE — 94640 AIRWAY INHALATION TREATMENT: CPT

## 2022-06-17 PROCEDURE — 2700000000 HC OXYGEN THERAPY PER DAY

## 2022-06-17 PROCEDURE — 6370000000 HC RX 637 (ALT 250 FOR IP): Performed by: HOSPITALIST

## 2022-06-17 PROCEDURE — 36415 COLL VENOUS BLD VENIPUNCTURE: CPT

## 2022-06-17 PROCEDURE — 80053 COMPREHEN METABOLIC PANEL: CPT

## 2022-06-17 PROCEDURE — 97535 SELF CARE MNGMENT TRAINING: CPT

## 2022-06-17 PROCEDURE — 6360000002 HC RX W HCPCS: Performed by: INTERNAL MEDICINE

## 2022-06-17 PROCEDURE — 83735 ASSAY OF MAGNESIUM: CPT

## 2022-06-17 PROCEDURE — 6370000000 HC RX 637 (ALT 250 FOR IP): Performed by: INTERNAL MEDICINE

## 2022-06-17 PROCEDURE — 97116 GAIT TRAINING THERAPY: CPT

## 2022-06-17 PROCEDURE — 2580000003 HC RX 258: Performed by: INTERNAL MEDICINE

## 2022-06-17 RX ORDER — NIFEDIPINE 30 MG/1
30 TABLET, EXTENDED RELEASE ORAL DAILY
Status: DISCONTINUED | OUTPATIENT
Start: 2022-06-17 | End: 2022-06-20 | Stop reason: HOSPADM

## 2022-06-17 RX ADMIN — ASPIRIN 81 MG: 81 TABLET, COATED ORAL at 08:28

## 2022-06-17 RX ADMIN — SODIUM CHLORIDE, PRESERVATIVE FREE 10 ML: 5 INJECTION INTRAVENOUS at 21:14

## 2022-06-17 RX ADMIN — SODIUM BICARBONATE 650 MG: 650 TABLET ORAL at 08:28

## 2022-06-17 RX ADMIN — ENOXAPARIN SODIUM 40 MG: 100 INJECTION SUBCUTANEOUS at 21:04

## 2022-06-17 RX ADMIN — BUDESONIDE 500 MCG: 0.5 SUSPENSION RESPIRATORY (INHALATION) at 18:54

## 2022-06-17 RX ADMIN — THIAMINE HCL TAB 100 MG 100 MG: 100 TAB at 08:28

## 2022-06-17 RX ADMIN — MIRTAZAPINE 3.75 MG: 7.5 TABLET ORAL at 21:05

## 2022-06-17 RX ADMIN — SODIUM CHLORIDE, PRESERVATIVE FREE 10 ML: 5 INJECTION INTRAVENOUS at 08:28

## 2022-06-17 RX ADMIN — NIFEDIPINE 30 MG: 30 TABLET, EXTENDED RELEASE ORAL at 18:55

## 2022-06-17 RX ADMIN — FOLIC ACID 1 MG: 1 TABLET ORAL at 08:28

## 2022-06-17 RX ADMIN — METOPROLOL 12.5 MG: 25 TABLET ORAL at 08:28

## 2022-06-17 RX ADMIN — CLOPIDOGREL BISULFATE 75 MG: 75 TABLET ORAL at 08:28

## 2022-06-17 RX ADMIN — SODIUM CHLORIDE, PRESERVATIVE FREE 10 ML: 5 INJECTION INTRAVENOUS at 08:29

## 2022-06-17 RX ADMIN — ATORVASTATIN CALCIUM 40 MG: 40 TABLET, FILM COATED ORAL at 08:28

## 2022-06-17 RX ADMIN — SODIUM BICARBONATE 650 MG: 650 TABLET ORAL at 18:55

## 2022-06-17 RX ADMIN — SODIUM BICARBONATE 650 MG: 650 TABLET ORAL at 12:48

## 2022-06-17 RX ADMIN — PANTOPRAZOLE SODIUM 40 MG: 40 TABLET, DELAYED RELEASE ORAL at 08:28

## 2022-06-17 RX ADMIN — ARFORMOTEROL TARTRATE 15 MCG: 15 SOLUTION RESPIRATORY (INHALATION) at 18:54

## 2022-06-17 RX ADMIN — METOPROLOL 12.5 MG: 25 TABLET ORAL at 21:05

## 2022-06-17 RX ADMIN — SODIUM BICARBONATE 650 MG: 650 TABLET ORAL at 21:05

## 2022-06-17 RX ADMIN — BUDESONIDE 500 MCG: 0.5 SUSPENSION RESPIRATORY (INHALATION) at 06:20

## 2022-06-17 RX ADMIN — ARFORMOTEROL TARTRATE 15 MCG: 15 SOLUTION RESPIRATORY (INHALATION) at 06:20

## 2022-06-17 ASSESSMENT — PAIN - FUNCTIONAL ASSESSMENT: PAIN_FUNCTIONAL_ASSESSMENT: ACTIVITIES ARE NOT PREVENTED

## 2022-06-17 ASSESSMENT — PAIN DESCRIPTION - ORIENTATION: ORIENTATION: LEFT

## 2022-06-17 ASSESSMENT — PAIN DESCRIPTION - DESCRIPTORS: DESCRIPTORS: ACHING;SORE

## 2022-06-17 ASSESSMENT — PAIN DESCRIPTION - LOCATION: LOCATION: ANKLE

## 2022-06-17 ASSESSMENT — PAIN SCALES - GENERAL
PAINLEVEL_OUTOF10: 8
PAINLEVEL_OUTOF10: 0

## 2022-06-17 ASSESSMENT — PAIN DESCRIPTION - PAIN TYPE: TYPE: ACUTE PAIN;CHRONIC PAIN

## 2022-06-17 NOTE — PROGRESS NOTES
Physical Therapy  Name: Bhakti Garcia  MRN:  [id number]  Date of service:  6/17/2022 06/17/22 1553   Restrictions/Precautions   Restrictions/Precautions Fall Risk;Seizure   Required Braces or Orthoses? No   General   Chart Reviewed Yes   Family / Caregiver Present No   Subjective   Subjective Patient is laying in bed and agrees to wprk with therapy    Pain Assessment   Pain Assessment 0-10   Pain Level 8   Pain Location Ankle   Pain Orientation Left   Pain Descriptors Aching; Sore   Functional Pain Assessment Activities are not prevented   Pain Type Acute pain;Chronic pain   Non-Pharmaceutical Pain Intervention(s) Ambulation/Increased Activity;Repositioned; Rest   Response to Pain Intervention Patient satisfied   Bed Mobility   Rolling Stand by assistance;Contact guard assistance   Supine to Sit Stand by assistance;Contact guard assistance   Scooting Contact guard assistance;Stand by assistance   Transfers   Sit to Stand Minimal Assistance;2 Person Assistance   Stand to sit Minimal Assistance;2 Person Assistance   Ambulation   Surface level tile   Device Rolling Walker   Other Apparatus O2   Assistance Minimal assistance;2 Person assistance   Quality of Gait unsteady, posterior lean, difficulty WS and initiating steps   Gait Deviations Decreased step height;Decreased step length; Slow [place]   Distance 50 ft    Patient Goals    Patient goals  get to rehab   Short Term Goals   Time Frame for Short term goals 2 wks   Short term goal 1 supine to sit indep   Short term goal 2 sit to stand indep   Short term goal 3 amb. 200' with RW SBA   Short term goal 4 bed to chair SBA   Conditions Requiring Skilled Therapeutic Intervention   Body Structures, Functions, Activity Limitations Requiring Skilled Therapeutic Intervention Decreased functional mobility ; Decreased ROM; Decreased sensation;Decreased cognition;Decreased safe awareness;Decreased strength;Decreased balance; Increased pain;Decreased posture;Decreased coordination Assessment Patient tolerates increased distance ambulated this date and able to use bathroom and perform hand hygiene with no LOB. He had increased B LE shaking at end of treatment and needed to get BTB due to not being able to ambulate to chair from bathroom. He was repositioned in supine with all needs in reach. Will continue to follow and progress as able. Activity Tolerance   Activity Tolerance Patient limited by fatigue;Patient limited by endurance   PT Plan of Care   Friday X   Safety Devices   Type of Devices Call light within reach; Bed alarm in place;Gait belt;Left in bed         Electronically signed by Sadia Vazquez PTA on 6/17/2022 at 4:04 PM

## 2022-06-17 NOTE — PLAN OF CARE
Problem: Discharge Planning  Goal: Discharge to home or other facility with appropriate resources  Outcome: Progressing     Problem: Safety - Adult  Goal: Free from fall injury  Outcome: Progressing     Problem: Skin/Tissue Integrity  Goal: Absence of new skin breakdown  Outcome: Progressing     Problem: Chronic Conditions and Co-morbidities  Goal: Patient's chronic conditions and co-morbidity symptoms are monitored and maintained or improved  Outcome: Progressing     Problem: Pain  Goal: Verbalizes/displays adequate comfort level or baseline comfort level  Outcome: Progressing     Problem: ABCDS Injury Assessment  Goal: Absence of physical injury  Outcome: Progressing

## 2022-06-17 NOTE — ADT AUTH CERT
Cardiology [facility] Day 1 (6/15/2022) by Lesley Curling, RN       Review Status Review Entered   Completed 6/16/2022 12:37      Criteria Review      Care Day: 1 Care Date: 6/15/2022 Level of Care: Intermediate Care    Guideline Day 1    Level Of Care    (X) ICU or intermediate care    6/16/2022 1:37 PM EDT by Kim Members      intermediate care    Clinical Status    ( ) * Clinical Indications met    6/16/2022 1:37 PM EDT by Kim Members      [address] 162/93 96%RA pain 5    Interventions    (X) Inpatient interventions as needed    6/16/2022 1:37 PM EDT by Ford Members      meds: brovana 15mcg neb 2xdaily pulmicort 0.5mg neb 2xdaily lovenox 40mg scnightly sodiumbicarb 650mg [id number]    * Milestone   Additional Notes   6/15 LOC IP Intermediate Care      Labs   Sodium: 136   Potassium: 4.5   Chloride: 96 (L)   CO2: 17 (L)   BUN,BUNPL: 32 (H)   Creatinine: 1.7 (H)   Anion Gap: 23 (H)   GFR Non-: 41 (A)   GFR : 49 (L)   Magnesium: 1.9   GLUCOSE, FASTING,GF: 63 (L)   CALCIUM, SERUM, 489198: 9.6   Total Protein: 6.5 (L)   Albumin: 3.7   Alk Phos: 101   ALT: 22   AST: 49 (H)   Bilirubin: 1.7 (H)   WBC: 9.9   RBC: 4.16 (L)   Hemoglobin Quant: 14.8   Hematocrit: 48.0   MCV: 115.4 (H)   MCH: 35.6 (H)   MCHC: 30.8 (L)   MPV: 11.1   RDW: 14.6 (H)   Platelet Count: 207   Neutrophils %: 77.1 (H)   Lymphocyte %: 13.1 (L)   Monocytes %: 7.1   Eosinophils %: 1.1   Basophils %: 0.8   Neutrophils Absolute: 7.6 (H)   Immature Granulocytes #: 0.1   Lymphocytes Absolute: 1.3   Monocytes Absolute: 0.70   Eosinophils Absolute: 0.10   Basophils Absolute: 0.10      Attending   Patient noted to have hypotension   Hypotension has been resolved and tachycardia.  Low-dose metoprolol added.  Social work continues to work on short term rehab placement.    Assessment/Plan   Principal Problem:     Hypotension /tachycardia /lactic acidosis               -Hypotension and lactic acidosis resolved with IV fluids and IV pressors               -Begin metoprolol for tachycardia as patient is now hypertensive               -Lactic acidosis has normalized               -Urinalysis unremarkable               -D-dimer elevated, VQ scan unremarkable               -Monitor and replace electrolytes as needed   Active Problems:     Essential hypertension               -Resumed home lisinopril               -Add low-dose metoprolol     Chronic kidney disease (CKD), stage IV (severe) (HCC)               -Monitor BMP               -Daily weight               -Intake and output     Cerebellar stroke (HCC)               -PT/OT               -Social work assisting in discharge planning     Alcohol abuse               -Withdrawals have resolved               -Continue low-dose Librium               -B1 and folic acid       Physical Exam   Vitals reviewed. Constitutional:        Appearance: He is ill-appearing (chronically ill appearing). HENT:       Head: Normocephalic.       Mouth/Throat:       Mouth: Mucous membranes are moist.       Pharynx: Oropharynx is clear. Eyes:       Pupils: Pupils are equal, round, and reactive to light. Cardiovascular:       Rate and Rhythm: Regular rhythm. Tachycardia present.       Pulses: Normal pulses.       Heart sounds: Normal heart sounds. Pulmonary:       Effort: Pulmonary effort is normal.       Breath sounds: Normal breath sounds. Abdominal:       General: Abdomen is flat. Bowel sounds are normal. There is no distension.       Palpations: Abdomen is soft.       Tenderness: There is no abdominal tenderness. There is no guarding. Musculoskeletal:          General: Normal range of motion.       Right lower leg: No edema.       Left lower leg: No edema. Skin:      General: Skin is warm and dry.       Capillary Refill: Capillary refill takes less than 2 seconds.     Neurological:       Mental Status: He is alert.       Comments: Patient oriented to person place and year today.        Substance-Related Disorders, Adult - Care Day 3 (6/14/2022) by Kari Clinton RN       Review Status Review Entered   Completed 6/15/2022 15:50      Criteria Review      Care Day: 3 Care Date: 6/14/2022 Level of Care:    Guideline Day 3    Clinical Status    (X) * No seizure or other severe withdrawal event for at least 24 hours    6/15/2022 4:50 PM EDT by Hipolito Fields      no seizures noted    (X) * Thoughts of suicide or Harm absent or manageable/treatable at available lower level of care    6/15/2022 4:50 PM EDT by Andreia Walton no thoughts of suicide noted    ( ) * Patient and supports understand follow-up treatment and crisis plan    ( ) * Provider and supports sufficiently available at lower level of care    ( ) * Patient can participate (eg, verify absence of plan for harm) in needed monitoring    ( ) * Functional status impairment absent or adequate self-care support planned at lower level of care    ( ) * Medical comorbidities and adverse medication events absent or manageable/treatable at available lower level of care    ( ) * Addiction treatment and/or withdrawal treatment needs manageable/treatable at available lower level of care    Medications    (X) Thiamine and multivitamin supplement for alcohol use disorder or dependence    6/15/2022 4:50 PM EDT by Hipolito Fields      thiamine 100mg iv daily    * Milestone   Additional Notes   Regular diet   Continuous telemetry monitoring   Bladder scan   Daily weights   Daily cbc, cmp, mag            Nm lung scan perfusion only: The study appearance is consistent with a low probability for pulmonary embolism. Please note according to the PIOPED criteria a low probability reflects a 0 to 19%chance of a pulmonary embolism. Please correlate clinically.  If symptoms persist, consider CT PA gram.          Wbc 7.8, hgb 13.7, hct 43.4, mag 2.7, na 135, creat 1.9, gfr 36, total protein 6.1, ast 44, lactic acid 1.3, d dimer 1.31, lipase 37, total ck 308, iron 54, tsh 2.880   Ua: 30 protein, negative bacteria            Bp: 151/96, pulse 101-106, resp 20, temp 97.2, o2 sat 100% on 2l per nasal cannula            Brovana nebs 15mcg bid   Asa 81mg po daily   Lipitor 40mg podaily   Pulmicort nebs 0.5mg bid   Bumex 0.5mg iv daily   Librium 5mg po tid   Plavix 75mg pod aily   Lovenox 40mg sub q qhs   Folvite 1mg po dialy   Lisinopril 20mg po daily   Melatonin 3mg po qhs   Remeron 3.75mg po qhs   Protonix 40mg po daily   Sodium bicarb 650mg po qid   Thiamine 100mg iv daily   Vit d 50,000 untis po weekly      Lr @ 75 cc/hr      Apresoline 5mg iv q6hrs prn- had 1 dose   Ativan 1mg iv q1hr prn- had 3 doses                  [facility]      CHIEF COMPLAINT shortness of breath       SUBJECTIVE:  Patient anxious for discharge. Reports he worked with therapy and may need rehab before returning home. Denies shortness of breath and chest pain. Denies nausea, vomiting, constipation, and diarrhea.  Reports generalized weakness.        CUMULATIVE HOSPITAL COURSE:   The patient is a 73-year-old male with past medical history of alcohol abuse, cerebellar stroke, COPD, GERD, hypertension, hyperlipidemia, and CKD who presented to [facility] ED with the complaints of shortness of breath.  On arrival patient was poor historian and multiple prior ED visits were noted. Piper Lark reported drinking 1 pint of vodka within 24 hours of admission.  Otherwise patient had no complaints.  Patient was found to be hypotensive in the ED was subsequently admitted to the ICU.  Patient required pressors and was gradually weaned off pressors with IV hydration.  Patient was then transferred to CCU.  PT/OT eval's were completed and indicated patient requiring moderate assistance and will need 24-hour supervision at discharge.  Social work discussed with patient and patient willing for short-term rehab before returning home.  Patient noted to have pancytopenia with no current complication.  D-dimer noted to be elevated and VQ scan obtained.  Renal functions remain stable for patient. [place] Clipper is on room air and tolerating well. Assessment/Plan   Principal Problem:     Hypotension-resolved               -continue to monitor closely       Active Problems:     Essential hypertension               -resumed home medications         Chronic kidney disease (CKD), stage IV (severe) (HCC)               -stable, avoid hypotension and nephrotoxins         Cerebellar stroke (HCC)               -noted               -PT/OT recommend 24 hour care               -SW discussed with patient and he would like short term rehab before returning home         Alcohol abuse               -continue CIWA               -patient does not want alcohol rehab at this               -monitor thrombocytopenia           Bebeto Resendez MD, 6/14/2022 8:46 PM             Assessment & Plan:    · Tachycardia/hypotension with lactic acidosis- no sings of infection-- resolved with IVF and IV pressors   · Elevated DD- rule out DVT and PE   · Cannabinoids abuse    · Alcohol abuse- b1, folic, librium, ativan prn    · Alcohol withdrawals - resolved    · Vit D def- replace    · Elevated BNP with pleural effusion-  echo     · Hypomagnesemia- overcorrected    · Hyponatremia - improved    · CKD stage 4   · History of CVA   · HL   · GERD   · Deconditioned - pt ot- snf       Bebeto Resendez MD                          Physical Therapy      Discharge Recommendations:   Continue to assess pending progress,24 hour supervision or assist,Patient would benefit from continued therapy after discharge       Assessment    Body Structures, Functions, Activity Limitations Requiring Skilled Therapeutic Intervention: Decreased functional mobility ; Decreased ROM; Decreased sensation;Decreased cognition;Decreased safe awareness;Decreased strength;Decreased balance; Increased pain;Decreased posture;Decreased coordination   Assessment: Pt. will benefit from cont.  PT to decrease impairments. Pt. a fall risk due to heavy posterior lean and lack of self correction for balance in standing, and he should not attempt mobility on his own at this time. Anticipate pt will need additional therapy upon d/c from [facility]. Pt. safer to use SS for transfers with staff due to difficulty initiating steps and holding balance. Pt. needs to use RW, gait belt and 2A with PT. Do not feel pt would be safe to d/c home to prior living situation. Treatment Diagnosis: impaired gait and mobility   Therapy Prognosis: Good   Decision Making: Medium Complexity   Barriers to Learning: cognition   Requires PT Follow-Up: Yes   Activity Tolerance   Activity Tolerance: Patient limited by fatigue;Treatment limited secondary to decreased cognition;Patient limited by endurance   Activity Tolerance Comments: pt needed rest breaks due to SOA        Plan    Plan   Plan: 6-7 times per week   Plan weeks: 2   Current Treatment Recommendations: Strengthening,ROM,Balance training,Functional mobility training,Transfer training,Gait training,Endurance training,Safety education & training,Patient/Caregiver education & training,Equipment evaluation, education, & procurement,Therapeutic activities,Positioning   Plan Comment: cont. PT per POC.    Safety Devices   Type of Devices: Call light within reach,Nurse notified,Chair alarm in place,Gait belt,Left in chair,Patient at risk for falls,Heels elevated for pressure relief (reclined)        Restrictions   Restrictions/Precautions   Restrictions/Precautions: Fall Risk,Seizure   Required Braces or Orthoses?: No        Subjective    Pain: complained of chronic back and neck pain   General   Chart Reviewed: Yes   Patient assessed for rehabilitation services?: Yes   Response To Previous Treatment: Not applicable   Family / Caregiver Present: No   Referring Practitioner: Escobar Cabral MD, Cyndi Mullen, KRYSTIAN - CNP   Referral Date : 06/14/22   Diagnosis: metabolic acidosis, acute alcohol intoxication, lactic acidosis, observed seizure like activity, acute renal insufficiency   Follows Commands: Impaired   Other (Comment): needs repetition   General Comment   Comments: RN, Yany Amaya PT.    Subjective   Subjective: Pt. willing to work with therapy.        Social/Functional History   Social/Functional History   Lives With: Alone (States he has a worker who helps with home management)   Type of Home: House   ADL Assistance: Independent (per patient)   Ambulation Assistance: Independent (with rollator)   Vision/Hearing   Hearing   Hearing: Within functional limits     Cognition    Orientation   Overall Orientation Status: Impaired   Orientation Level: Oriented to place;Oriented to person;Disoriented to time;Disoriented to situation   Cognition   Cognition Comment: Able to engage patient in therapeutic activity, requires supervision and redirection        Objective    Heart Rate: 90   Heart Rate Source: Monitor   BP: (!) 154/95   BP Location: Right upper arm   MAP (Calculated): 114.67   Resp: 18   SpO2: 99 %   O2 Device: Nasal cannula   Observation/Palpation   Posture: Fair   Observation: IV, O2, telemetry, polo       AROM RLE (degrees)   RLE AROM: Exceptions   RLE General AROM: hips flex just past 90, knees do not fully extend, ankles WFLs   AROM LLE (degrees)   LLE AROM : Exceptions   LLE General AROM: hips flex just past 90, knees do not fully extend, ankles WFLs   Strength RLE   Strength RLE: Exception   Comment: hips 3-/5, knees 3+/5, ankles 4/5   Strength LLE   Strength LLE: Exception   Comment: hips 3-/5, knees 3+/5, ankles 4/5   Bed mobility   Supine to Sit: Contact guard assistance   Bed Mobility Comments: needed additional v. cues to get feet to floor   Transfers   Sit to Stand: Minimal Assistance;2 Person Assistance   Stand to sit: Minimal Assistance;2 Person Assistance   Comment: posterior lean noted in standing, improved with additonal time spent in standing, but v. and tactile cues needed. Mod A to maintain standing initially. Ambulation   Surface: level tile   Device: Rolling Walker   Other Apparatus: O2   Assistance: Minimal assistance;2 Person assistance; Moderate assistance   Quality of Gait: unsteady, posterior lean, difficulty WS and initiating steps   Gait Deviations: Decreased step height;Decreased step length; Slow [place]   Distance: 2' to chair   Comments: IV, polo, O2, telemetry   Balance   Posture: Good   Sitting - Static: +;Fair   Sitting - Dynamic: Fair;-   Standing - Static: Poor;+   Standing - Dynamic: Poor       OutComes Score                                                      AM-PAC Score       Goals   Short Term Goals   Time Frame for Short term goals: 2 wks   Short term goal 1: supine to sit indep   Short term goal 2: sit to stand indep   Short term goal 3: amb. 200' with RW SBA   Short term goal 4: bed to chair SBA   Patient Goals    Patient goals : get to rehab       Education   Patient Education   Education Given To: Patient   Education Provided: Role of Therapy;Plan of Care   Education Provided Comments: use of call light   Education Method: Verbal;Demonstration   Barriers to Learning: Cognition;Lack of Family Support   Education Outcome: Verbalized understanding;Continued education needed                  Social work/dc planning note:      Therapy evals completed and document minimal ambulation with moderate to full assist. Spoke with pt who is in agreement unable to return to his home and manage care needs at this time. Pt resides in [facility] and is agreeable to a referral to [facility]. Pt's insurance will cover services for 30days and pt agrees also stating \"I may never be able to get back home if I don't get stronger. \"       SW made the referral and awaiting review/acceptance from the facility at this time.    [facility]   Ph [phone]   Fax [phone]            Pt denies issues with ETOH at this time.  Pt declines any resources or treatment services for residential or OP. Still treating pt for withdrawal symptoms and SW following for different consideration when medically stable.

## 2022-06-17 NOTE — PROGRESS NOTES
Speech Language Pathology  Facility/Department: [facility] PROGRESSIVE CARE  SWALLOW THERAPY  SPEECH THERAPY     NAME: Danielle Mcnair  :   MRN: [id number]    ADMISSION DATE: 2022  ADMITTING DIAGNOSIS: has Essential hypertension; Fe deficiency anemia; Hypertriglyceridemia without hypercholesterolemia; History of gastric ulcer; Chronic kidney disease (CKD), stage IV (severe) ([address] Cerebellar stroke ([address] TIA (transient ischemic attack); Nonintractable headache; Transient cerebral ischemia; HCAP (healthcare-associated pneumonia); H/O ETOH abuse; Sepsis ([address] Pneumonia due to organism; Iron deficiency anemia; Abnormal CT scan; Gastroesophageal reflux disease; Acute renal failure ([address] Syncope and collapse; Polysubstance abuse ([address] Alcohol abuse; Alcohol withdrawal syndrome without complication ([address] Alcohol abuse; Other chest pain; Epigastric pain; Dizziness; and Hypotension on their problem list.    Date of Treat: 2022  Evaluating Therapist: MICHAEL Hale    Current Diet level:  Regular solid consistency with mildly thick/nectar thick liquids    Reason for Referral  Danielle Mcnair was referred for a bedside swallow evaluation to assess the efficiency of his swallow function, identify signs and symptoms of aspiration and make recommendations regarding safe dietary consistencies, effective compensatory strategies, and safe eating environment. Impression  Monitored patient's swallowing function. Patient exhibited slow and decreased oral prep of more solid consistencies as well as sluggish, inconsistently mildly decreased laryngeal elevation for swallow airway protection. Even so, no outward S/S penetration/aspiration was noted with any puree consistency presentation or regular solid consistency presentation administered during treatment session this date. Just mild delayed coughs were observed with mildly thick/nectar thick liquid presentations.     At this time, would recommend regular solid consistency with mildly thick/nectar thick liquids. Recommend meds whole in pudding/applesauce. Will continue to follow.     Treatment Plan  Requires SLP Intervention: Yes     Recommended Diet and Intervention  Diet Solids Recommendation: Regular solid  Liquid Consistency Recommendation: Mildly thick (nectar)  Recommended Form of Meds: Meds whole in puree as able  Therapeutic Interventions: Patient/Family education;Diet tolerance monitoring; Therapeutic PO trials with SLP     Compensatory Swallowing Strategies  Compensatory Swallowing Strategies: Upright as possible for all oral intake;Small bites/sips;Eat/Feed slowly; Alternate solids and liquids; Remain upright for 30-45 minutes after meals     Treatment/Goals  Timeframe for Short-term Goals: 1-2x/day for 3 days   Goal 1: Patient will tolerate regular solid consistency and mildly thick/nectar thick liquids with min S/S penetration/aspiration during PO intake. Goal 2: Patient staff will follow swallow safety recommendations to decrease risk of penetration/aspiration during PO intake. Goal 3: Re-assessment of swallow function for potential diet upgrade. Goal 4: Patient will complete oral motor, lingual, and pharyngeal exercises with provision of mod cues/prompts. Goal 5: Monitor speech production.     General  Chart Reviewed: Yes  Behavior/Cognition: Alert; Cooperative  O2 Device: None (Room air)  Communication Observation: (Monitored patient's speech production. Patient continued to exhibit slowed, decreased lingual movements during verbalizations. SLP still ranked functional intelligibility of speech for unfamiliar listeners at % in utterances with background noise present.)  Follows Directions: Simple   Dentition: Upper dentures   Patient Positioning: Upright in bed  Consistencies Administered: Dysphagia Pureed (Dysphagia I); Regular solid; Nectar - cup;Nectar - straw     Oral Motor Examination   Labial ROM: (Adequate during labial protrusion trials.)  Labial Strength: (Adequate during labial compression trials.)  Labial Coordination: (Adequate movements were noted.)  Lingual ROM: (Adequate during lingual extension trials with full point achieved; decreased during lingual elevation trials without use of accessory jaw movement; adequate movements noted bilaterally.)  Lingual Strength: (Decreased)  Lingual Coordination: (Slowed movements were noted.)     Monitored patient's swallowing function with the following observations noted:     Oral Phase  Mastication: Regular solid (Patient exhibited vertical jaw movement at the front of the mouth during oral prep of regular solid consistency presentations administered independently.)  Oral Phase - Comment: Oral transit of puree consistency presentations, administered by RN, primarily measured 1-2 seconds in length and no oral cavity residue was noted post swallows. Oral transit of regular solid consistency presentations primarily measured 1-2 seconds in length and min oral cavity residue was observed post swallows; residue cleared from the mouth with additional dry swallows. Oral transit of nectar thick liquid presentations, administered independently via cup/straw, primarily measured 1-2 seconds in length.      Pharyngeal Phase  Laryngeal Elevation: (Patient exhibited sluggish, inconsistently mildly decreased laryngeal elevation for swallow airway protection.)  Delayed Coughs: Nectar - straw   Pharyngeal Phase - Comment: No outward S/S penetration/aspiration was noted with any puree consistency presentation or regular solid consistency presentation administered during treatment session this date. Just mild delayed coughs were observed with mildly thick/nectar thick liquid presentations. At this time, would recommend regular solid consistency with mildly thick/nectar thick liquids. Recommend meds whole in pudding/applesauce. Will continue to follow.     Electronically signed by MICHAEL Randhawa on 6/17/2022 at 1:15 PM

## 2022-06-17 NOTE — PROGRESS NOTES
Progress Note  Date:2022       Room:0710/710-02  Patient Jose Miguel Persaud     YOB: 1955     Age:66 y.o. Subjective    Subjective: 80-year-old male with past medical history of alcohol abuse, cerebellar stroke, COPD, GERD, hypertension, hyperlipidemia, and CKD who presented to [facility] ED with the complaints of shortness of breath.  On arrival patient was poor historian and multiple prior ED visits were noted. Steffanie Homans reported drinking 1 pint of vodka within 24 hours of admission. Patient was found to be hypotensive in the ED was subsequently admitted to the ICU.  Patient required pressors and was gradually weaned off pressors with IV hydration.  Patient was then transferred to PCU. D-dimer noted to be elevated and VQ scan with low probibility.  Renal functions remained stable for patient. Steffanie Homans is on room air and tolerating well. Low-dose metoprolol added. PT/OT eval's were completed and indicated patient requiring moderate assistance and will need 24-hour supervision at discharge.  Social work discussed with patient and patient willing for short-term rehab before returning home. Social work continues to work on short term rehab placement. Patient has been declined at facilities in [facility] as well as being in [place]. Currently denies any acute issues in house and no documentations of events. Review of Systems: 12 point system reviewed and negative except as stated above. Objective         Vitals Last 24 Hours:  TEMPERATURE:  Temp  Av.2 °F (36.2 °C)  Min: 96.6 °F (35.9 °C)  Max: 97.5 °F (36.4 °C)  RESPIRATIONS RANGE: Resp  Av.2  Min: 16  Max: 20  PULSE OXIMETRY RANGE: SpO2  Av %  Min: 93 %  Max: 98 %  PULSE RANGE: Pulse  Av.4  Min: 73  Max: 86  BLOOD PRESSURE RANGE: Systolic (67XLE), SK , Min:127 , OXV:588   ; Diastolic (73XGZ), NVC:55, Min:70, Max:92    I/O (24Hr):     Intake/Output Summary (Last 24 hours) at 2022 1405  Last data filed at 6/17/2022 0909  Gross per 24 hour   Intake 330 ml   Output 650 ml   Net -320 ml       Physical Examination:  General: Ill-appearing, well-developed, no acute distress lying comfortably in bed. HEENT: Atraumatic normocephalic, range of motion normal, no JVD, no tracheal deviation noted. Cardiac: Normal S1-S2   Respiratory: clear To auscultation bilaterally, no rhonchi or rales, no wheezing  Abdomen: Soft, positive bowel sounds in all quadrants, no distention, nontender to palpation  Extremities: no tenderness, no edema, moves all extremities  Psych: Affect normal and good eye contact, behavioral normal.        Labs/Imaging/Diagnostics    Labs:  CBC:  Recent Labs     06/15/22  0205 06/16/22  0137 06/17/22  0155   WBC 9.9 6.8 5.9   RBC 4.16* 3.67* 3.48*   HGB 14.8 12.7* 12.3*   HCT 48.0 38.9* 38.6*   .4* 106.0* 110.9*   RDW 14.6* 14.5 14.6*    146 160     CHEMISTRIES:  Recent Labs     06/15/22  0205 06/16/22  0137 06/17/22  0155    138 142   K 4.5 3.7 3.7   CL 96* 101 104   CO2 17* 19* 24   BUN 32* 47* 50*   CREATININE 1.7* 2.0* 2.0*   GLUCOSE 63* 132* 123*   MG 1.9 1.8 1.8     PT/INR:No results for input(s): PROTIME, INR in the last 72 hours. APTT:No results for input(s): APTT in the last 72 hours. LIVER PROFILE:  Recent Labs     06/15/22  0205 06/16/22  0137 06/17/22  0155   AST 49* 34 39   ALT 22 17 17   BILITOT 1.7* 1.0 0.8   ALKPHOS 101 88 84       Imaging Last 24 Hours:  No results found.   Assessment//Plan           Hospital Problems           Last Modified POA    * (Principal) Hypotension 6/12/2022 Yes    Essential hypertension (Chronic) 6/14/2022 Yes    Chronic kidney disease (CKD), stage IV (severe) (HCC) (Chronic) 6/14/2022 Yes    Cerebellar stroke ([address] 6/14/2022 Yes    Alcohol abuse 6/14/2022 Yes        Assessment & Plan    Alcohol dependence with acute intoxication on admission: Now resolved  No evidence of withdrawal symptoms in-house  CIWA protocol discontinued  Continue thiamine and folic acid. Continue to monitor electrolytes closely. CKD stage IV: Stable    Tobacco dependence  Counseled on cessation    Hypertension Home initiated on nifedipine. History of CVA  On aspirin Plavix and statin. PT OT following in-house    Transient hypotension with tachycardia and lactic acidosis on admission: Resolved      Code: Full  DVT prophylaxis: Lovenox  Disposition: Awaiting SNF placement        Electronically signed by   Roseanna Smith MD   Internal Medicine Hospitalist  On 6/17/2022  At 2:24 PM    EMR Dragon/Transcription disclaimer:   Much of this encounter note is an electronic transcription/translation of spoken language to printed text.  The electronic translation of spoken language may permit erroneous, or at times, nonsensical words or phrases to be inadvertently transcribed; although attempts have made to review the note for such errors, some may still exist.

## 2022-06-17 NOTE — PROGRESS NOTES
Occupational Therapy     06/17/22 1400   Subjective   Subjective Pt in bed upon arrival for therapy. Pt agreeable to participate with some encouragement. Pain Assessment   Pain Assessment None - Denies Pain   Orientation   Overall Orientation Status WFL   Bed Mobility Training   Bed Mobility Training Yes   Overall Level of Assistance Contact-guard assistance   Interventions Verbal cues; Tactile cues   Supine to Sit Contact-guard assistance   Sit to Supine Contact-guard assistance   Scooting Contact-guard assistance   Transfer Training   Transfer Training Yes   Overall Level of Assistance Minimum assistance;Contact-guard assistance   Interventions Verbal cues; Tactile cues   Sit to Stand Minimum assistance;Contact-guard assistance   Stand to Sit Minimum assistance;Contact-guard assistance   Toilet Transfer Minimum assistance;Contact-guard assistance  (regular toilet with grab bars. )   Balance   Sitting Intact   Standing With support   ADL   Feeding Supervision   Grooming Contact guard assistance  (at sink in standing )   UE Bathing Stand by assistance   LE Bathing Minimal assistance   LE Bathing Skilled Clinical Factors seated   UE Dressing Supervision   LE Dressing Minimal assistance   Toileting Minimal assistance   Additional Comments Pt limited by endurance and standing tolerance. Assessment   Assessment Tx focused on functional mobility at RW level 100 ft this date and performed toileting task, h/g task at sink in standing. Pt required Mod assist for clothing management during task. Activity Tolerance Patient limited by endurance; Patient limited by fatigue   Discharge Recommendations Patient would benefit from continued therapy after discharge   Plan   Times per Week 3-5   Times per Day Daily   OT Plan of Care   Friday X   Electronically signed by MYRANDA Hood on 6/17/2022 at 2:08 PM

## 2022-06-17 NOTE — CARE COORDINATION
Spoke with Escobar Tomas at [facility] in [place] and they do not have any beds available at this time. Will speak with patient for more choices. Electronically signed by Melo Fowler RN on 6/17/2022 at 12:01 PM      Referral faxed to [facility].   Awaiting acceptance/denial.    [facility]  [phone]  F  [phone]   Electronically signed by Melo Fowler RN on 6/17/2022 at 2:55 PM

## 2022-06-18 LAB
ALBUMIN SERPL-MCNC: 3.1 G/DL (ref 3.5–5.2)
ALP BLD-CCNC: 84 U/L (ref 40–130)
ALT SERPL-CCNC: 26 U/L (ref 5–41)
ANION GAP SERPL CALCULATED.3IONS-SCNC: 15 MMOL/L (ref 7–19)
AST SERPL-CCNC: 86 U/L (ref 5–40)
BASOPHILS ABSOLUTE: 0.1 K/UL (ref 0–0.2)
BASOPHILS RELATIVE PERCENT: 0.9 % (ref 0–1)
BILIRUB SERPL-MCNC: 0.8 MG/DL (ref 0.2–1.2)
BUN BLDV-MCNC: 35 MG/DL (ref 8–23)
CALCIUM SERPL-MCNC: 8.6 MG/DL (ref 8.8–10.2)
CHLORIDE BLD-SCNC: 103 MMOL/L (ref 98–111)
CO2: 22 MMOL/L (ref 22–29)
CREAT SERPL-MCNC: 1.5 MG/DL (ref 0.5–1.2)
EOSINOPHILS ABSOLUTE: 0.1 K/UL (ref 0–0.6)
EOSINOPHILS RELATIVE PERCENT: 2.1 % (ref 0–5)
GFR AFRICAN AMERICAN: 57
GFR NON-AFRICAN AMERICAN: 47
GLUCOSE BLD-MCNC: 113 MG/DL (ref 74–109)
HCT VFR BLD CALC: 40 % (ref 42–52)
HEMOGLOBIN: 12.8 G/DL (ref 14–18)
IMMATURE GRANULOCYTES #: 0.1 K/UL
LYMPHOCYTES ABSOLUTE: 1 K/UL (ref 1.1–4.5)
LYMPHOCYTES RELATIVE PERCENT: 17.2 % (ref 20–40)
MAGNESIUM: 1.5 MG/DL (ref 1.6–2.4)
MCH RBC QN AUTO: 35.1 PG (ref 27–31)
MCHC RBC AUTO-ENTMCNC: 32 G/DL (ref 33–37)
MCV RBC AUTO: 109.6 FL (ref 80–94)
MONOCYTES ABSOLUTE: 0.5 K/UL (ref 0–0.9)
MONOCYTES RELATIVE PERCENT: 7.9 % (ref 0–10)
NEUTROPHILS ABSOLUTE: 4.1 K/UL (ref 1.5–7.5)
NEUTROPHILS RELATIVE PERCENT: 70.9 % (ref 50–65)
PDW BLD-RTO: 14.4 % (ref 11.5–14.5)
PLATELET # BLD: 171 K/UL (ref 130–400)
PMV BLD AUTO: 10.6 FL (ref 9.4–12.4)
POTASSIUM SERPL-SCNC: 3.7 MMOL/L (ref 3.5–5)
RBC # BLD: 3.65 M/UL (ref 4.7–6.1)
SODIUM BLD-SCNC: 140 MMOL/L (ref 136–145)
TOTAL PROTEIN: 5.7 G/DL (ref 6.6–8.7)
WBC # BLD: 5.8 K/UL (ref 4.8–10.8)

## 2022-06-18 PROCEDURE — 2140000000 HC CCU INTERMEDIATE R&B

## 2022-06-18 PROCEDURE — 6370000000 HC RX 637 (ALT 250 FOR IP): Performed by: NURSE PRACTITIONER

## 2022-06-18 PROCEDURE — 85025 COMPLETE CBC W/AUTO DIFF WBC: CPT

## 2022-06-18 PROCEDURE — 6370000000 HC RX 637 (ALT 250 FOR IP): Performed by: PSYCHIATRY & NEUROLOGY

## 2022-06-18 PROCEDURE — 6370000000 HC RX 637 (ALT 250 FOR IP): Performed by: EMERGENCY MEDICINE

## 2022-06-18 PROCEDURE — 6370000000 HC RX 637 (ALT 250 FOR IP): Performed by: INTERNAL MEDICINE

## 2022-06-18 PROCEDURE — 2580000003 HC RX 258: Performed by: INTERNAL MEDICINE

## 2022-06-18 PROCEDURE — 6370000000 HC RX 637 (ALT 250 FOR IP): Performed by: HOSPITALIST

## 2022-06-18 PROCEDURE — 83735 ASSAY OF MAGNESIUM: CPT

## 2022-06-18 PROCEDURE — 6360000002 HC RX W HCPCS: Performed by: INTERNAL MEDICINE

## 2022-06-18 PROCEDURE — 36415 COLL VENOUS BLD VENIPUNCTURE: CPT

## 2022-06-18 PROCEDURE — 2700000000 HC OXYGEN THERAPY PER DAY

## 2022-06-18 PROCEDURE — 99253 IP/OBS CNSLTJ NEW/EST LOW 45: CPT | Performed by: PSYCHIATRY & NEUROLOGY

## 2022-06-18 PROCEDURE — 94640 AIRWAY INHALATION TREATMENT: CPT

## 2022-06-18 PROCEDURE — 80053 COMPREHEN METABOLIC PANEL: CPT

## 2022-06-18 RX ORDER — DOXEPIN HYDROCHLORIDE 25 MG/1
25 CAPSULE ORAL NIGHTLY
Status: DISCONTINUED | OUTPATIENT
Start: 2022-06-18 | End: 2022-06-20 | Stop reason: HOSPADM

## 2022-06-18 RX ADMIN — SODIUM CHLORIDE, PRESERVATIVE FREE 10 ML: 5 INJECTION INTRAVENOUS at 09:03

## 2022-06-18 RX ADMIN — BUDESONIDE 500 MCG: 0.5 SUSPENSION RESPIRATORY (INHALATION) at 06:59

## 2022-06-18 RX ADMIN — SODIUM CHLORIDE, PRESERVATIVE FREE 10 ML: 5 INJECTION INTRAVENOUS at 20:02

## 2022-06-18 RX ADMIN — MIRTAZAPINE 3.75 MG: 7.5 TABLET ORAL at 20:01

## 2022-06-18 RX ADMIN — ASPIRIN 81 MG: 81 TABLET, COATED ORAL at 09:02

## 2022-06-18 RX ADMIN — SODIUM BICARBONATE 650 MG: 650 TABLET ORAL at 17:47

## 2022-06-18 RX ADMIN — ARFORMOTEROL TARTRATE 15 MCG: 15 SOLUTION RESPIRATORY (INHALATION) at 07:00

## 2022-06-18 RX ADMIN — Medication 3 MG: at 00:00

## 2022-06-18 RX ADMIN — METOPROLOL 12.5 MG: 25 TABLET ORAL at 09:02

## 2022-06-18 RX ADMIN — SODIUM BICARBONATE 650 MG: 650 TABLET ORAL at 09:03

## 2022-06-18 RX ADMIN — CLOPIDOGREL BISULFATE 75 MG: 75 TABLET ORAL at 09:02

## 2022-06-18 RX ADMIN — FOLIC ACID 1 MG: 1 TABLET ORAL at 09:03

## 2022-06-18 RX ADMIN — ATORVASTATIN CALCIUM 40 MG: 40 TABLET, FILM COATED ORAL at 09:03

## 2022-06-18 RX ADMIN — ENOXAPARIN SODIUM 40 MG: 100 INJECTION SUBCUTANEOUS at 20:01

## 2022-06-18 RX ADMIN — DOXEPIN HYDROCHLORIDE 25 MG: 25 CAPSULE ORAL at 20:01

## 2022-06-18 RX ADMIN — SODIUM BICARBONATE 650 MG: 650 TABLET ORAL at 20:01

## 2022-06-18 RX ADMIN — ACETAMINOPHEN 650 MG: 325 TABLET ORAL at 20:01

## 2022-06-18 RX ADMIN — NIFEDIPINE 30 MG: 30 TABLET, EXTENDED RELEASE ORAL at 09:02

## 2022-06-18 RX ADMIN — THIAMINE HCL TAB 100 MG 100 MG: 100 TAB at 09:02

## 2022-06-18 RX ADMIN — Medication 3 MG: at 20:01

## 2022-06-18 RX ADMIN — METOPROLOL 12.5 MG: 25 TABLET ORAL at 20:01

## 2022-06-18 RX ADMIN — SODIUM BICARBONATE 650 MG: 650 TABLET ORAL at 15:05

## 2022-06-18 RX ADMIN — PANTOPRAZOLE SODIUM 40 MG: 40 TABLET, DELAYED RELEASE ORAL at 09:02

## 2022-06-18 ASSESSMENT — PAIN SCALES - GENERAL: PAINLEVEL_OUTOF10: 8

## 2022-06-18 ASSESSMENT — PAIN DESCRIPTION - FREQUENCY: FREQUENCY: INTERMITTENT

## 2022-06-18 ASSESSMENT — PAIN DESCRIPTION - LOCATION: LOCATION: ANKLE

## 2022-06-18 ASSESSMENT — PAIN - FUNCTIONAL ASSESSMENT: PAIN_FUNCTIONAL_ASSESSMENT: PREVENTS OR INTERFERES SOME ACTIVE ACTIVITIES AND ADLS

## 2022-06-18 ASSESSMENT — PAIN DESCRIPTION - ORIENTATION: ORIENTATION: LEFT

## 2022-06-18 ASSESSMENT — PAIN DESCRIPTION - DESCRIPTORS: DESCRIPTORS: ACHING;GNAWING

## 2022-06-18 ASSESSMENT — PAIN DESCRIPTION - PAIN TYPE: TYPE: ACUTE PAIN

## 2022-06-18 ASSESSMENT — PAIN DESCRIPTION - ONSET: ONSET: GRADUAL

## 2022-06-18 NOTE — PROGRESS NOTES
Comprehensive Nutrition Assessment    Type and Reason for Visit:  Initial,RD Nutrition Re-Screen/LOS    Nutrition Recommendations/Plan:   1. Continue current POC     Malnutrition Assessment:  Malnutrition Status:  No malnutrition (06/18/22 1113)    Context:  Acute Illness     Findings of the 6 clinical characteristics of malnutrition:  Energy Intake:  Mild decrease in energy intake (Comment)  Weight Loss:  Greater than 2% over 1 week     Body Fat Loss:  No significant body fat loss     Muscle Mass Loss:  No significant muscle mass loss    Fluid Accumulation:  No significant fluid accumulation Extremities   Strength:  Not Performed    Nutrition Assessment:    Following patient for LOS x 6 days. SLP working with patient--recommending Regular diet with mildly thick liquids. Intake has improved and is now having more % meals. Nutrition Related Findings:      Wound Type: None       Current Nutrition Intake & Therapies:          ADULT DIET; Regular; Mildly Thick (Nectar)    Anthropometric Measures:  Height: 5' 9\" (175.3 cm)  Ideal Body Weight (IBW): 160 lbs (73 kg)    Admission Body Weight: 182 lb 11.2 oz (82.9 kg)  Current Body Weight: 176 lb 9.6 oz (80.1 kg), 110.4 % IBW. Current BMI (kg/m2): 26.1  BMI Categories: Overweight (BMI 25.0-29. 9)    Nutrition Diagnosis:   · Inadequate oral intake related to swallowing difficulty as evidenced by swallow study results      Nutrition Interventions:   Food and/or Nutrient Delivery: Continue Current Diet  Nutrition Education/Counseling: No recommendation at this time  Coordination of Nutrition Care: Continue to monitor while inpatient       Goals:     Goals: Meet at least 75% of estimated needs       Nutrition Monitoring and Evaluation:      Food/Nutrient Intake Outcomes: None Identified  Physical Signs/Symptoms Outcomes: Biochemical Data,Chewing or Swallowing,Weight,Skin,Fluid Status or Edema    Discharge Planning:    Continue current diet     Shelly Officer, MS, RD, LD  Contact: [phone]

## 2022-06-18 NOTE — PLAN OF CARE
Problem: Discharge Planning  Goal: Discharge to home or other facility with appropriate resources  Outcome: Progressing     Problem: Safety - Adult  Goal: Free from fall injury  Outcome: Progressing     Problem: Skin/Tissue Integrity  Goal: Absence of new skin breakdown  Description: 1. Monitor for areas of redness and/or skin breakdown  2. Assess vascular access sites hourly  3. Every 4-6 hours minimum:  Change oxygen saturation probe site  4. Every 4-6 hours:  If on nasal continuous positive airway pressure, respiratory therapy assess nares and determine need for appliance change or resting period.   Outcome: Progressing     Problem: Chronic Conditions and Co-morbidities  Goal: Patient's chronic conditions and co-morbidity symptoms are monitored and maintained or improved  Outcome: Progressing     Problem: Pain  Goal: Verbalizes/displays adequate comfort level or baseline comfort level  Outcome: Progressing     Problem: ABCDS Injury Assessment  Goal: Absence of physical injury  Outcome: Progressing

## 2022-06-18 NOTE — PROGRESS NOTES
Progress Note  Date:2022       Room:0710/710-02  Patient Kenji Monsivais     YOB: 1955     Age:66 y.o. Subjective    Subjective: 22-year-old male with past medical history of alcohol abuse, cerebellar stroke, COPD, GERD, hypertension, hyperlipidemia, and CKD who presented to [facility] ED with the complaints of shortness of breath.  On arrival patient was poor historian and multiple prior ED visits were noted. Gisell Butler reported drinking 1 pint of vodka within 24 hours of admission. Patient was found to be hypotensive in the ED was subsequently admitted to the ICU.  Patient required pressors and was gradually weaned off pressors with IV hydration.  Patient was then transferred to PCU. D-dimer noted to be elevated and VQ scan with low probibility.  Renal functions remained stable for patient. Gisell Butler is on room air and tolerating well. Low-dose metoprolol added. PT/OT eval's were completed and indicated patient requiring moderate assistance and will need 24-hour supervision at discharge.  Social work discussed with patient and patient willing for short-term rehab before returning home. Social work continues to work on short term rehab placement. Patient has been declined at facilities in [facility] as well as [place]. Currently denies any acute issues in house and no documentations of events. Review of Systems: 12 point system reviewed and negative except as stated above. Objective         Vitals Last 24 Hours:  TEMPERATURE:  Temp  Av.6 °F (36.4 °C)  Min: 97 °F (36.1 °C)  Max: 98.2 °F (36.8 °C)  RESPIRATIONS RANGE: Resp  Av  Min: 16  Max: 20  PULSE OXIMETRY RANGE: SpO2  Av.4 %  Min: 94 %  Max: 99 %  PULSE RANGE: Pulse  Av.2  Min: 81  Max: 98  BLOOD PRESSURE RANGE: Systolic (58HEO), NPH:563 , Min:104 , KWC:349   ; Diastolic (04DWO), WOV:05, Min:67, Max:84    I/O (24Hr):     Intake/Output Summary (Last 24 hours) at 2022 1202  Last data filed at 2022 4022  Gross per 24 hour   Intake 400 ml   Output 502 ml   Net -102 ml       Physical Examination:  General: Ill-appearing, well-developed, no acute distress lying comfortably in bed. HEENT: Atraumatic normocephalic, range of motion normal, no JVD, no tracheal deviation noted. Cardiac: Normal S1-S2   Respiratory: clear To auscultation bilaterally, no rhonchi or rales, no wheezing  Abdomen: Soft, positive bowel sounds in all quadrants, no distention, nontender to palpation  Extremities: no tenderness, no edema, moves all extremities  Psych: Affect normal and good eye contact, behavioral normal.        Labs/Imaging/Diagnostics    Labs:  CBC:  Recent Labs     06/16/22 0137 06/17/22 0155 06/18/22  0202   WBC 6.8 5.9 5.8   RBC 3.67* 3.48* 3.65*   HGB 12.7* 12.3* 12.8*   HCT 38.9* 38.6* 40.0*   .0* 110.9* 109.6*   RDW 14.5 14.6* 14.4    160 171     CHEMISTRIES:  Recent Labs     06/16/22 0137 06/17/22 0155 06/18/22  0202    142 140   K 3.7 3.7 3.7    104 103   CO2 19* 24 22   BUN 47* 50* 35*   CREATININE 2.0* 2.0* 1.5*   GLUCOSE 132* 123* 113*   MG 1.8 1.8 1.5*     PT/INR:No results for input(s): PROTIME, INR in the last 72 hours. APTT:No results for input(s): APTT in the last 72 hours. LIVER PROFILE:  Recent Labs     06/16/22 0137 06/17/22 0155 06/18/22  0202   AST 34 39 86*   ALT 17 17 26   BILITOT 1.0 0.8 0.8   ALKPHOS 88 84 84       Imaging Last 24 Hours:  No results found.   Assessment//Plan           Hospital Problems           Last Modified POA    * (Principal) Hypotension 6/12/2022 Yes    Essential hypertension (Chronic) 6/14/2022 Yes    Chronic kidney disease (CKD), stage IV (severe) (HCC) (Chronic) 6/14/2022 Yes    Cerebellar stroke ([facility] [address] 6/14/2022 Yes    Alcohol abuse 6/14/2022 Yes        Assessment & Plan    Alcohol dependence with acute intoxication on admission: Now resolved  No evidence of withdrawal symptoms in-house  CIWA protocol discontinued  Continue thiamine and folic acid.  Continue to monitor electrolytes closely. CKD stage III: Stable    Tobacco dependence  Counseled on cessation    Hypertension: initiated on nifedipine. History of CVA  On aspirin Plavix and statin. PT OT following in-house    Transient hypotension with tachycardia and lactic acidosis on admission: Resolved      Code: Full  DVT prophylaxis: Lovenox  Disposition: Awaiting SNF placement        Electronically signed by   Jg Odom MD   Internal Medicine Hospitalist  On 6/18/2022  At 12:02 PM    EMR Dragon/Transcription disclaimer:   Much of this encounter note is an electronic transcription/translation of spoken language to printed text.  The electronic translation of spoken language may permit erroneous, or at times, nonsensical words or phrases to be inadvertently transcribed; although attempts have made to review the note for such errors, some may still exist.

## 2022-06-18 NOTE — CONSULTS
the bed, level of the pain is 4-5 out of 10, with 10 being the worst.  He reported that dealing with pain is his major current stressor. He denies current active suicidal or homicidal ideations, denies any plans. Also, he denies any auditory and visual hallucinations. PSYCHIATRIC HISTORY:  Diagnoses: Denies  Suicide attempts/gestures: Denies   Prior hospitalizations: Denies   Medication trials: Cymbalta for chronic pain  Mental health contact: Primary care provider manages patient's psychotropic medications. Head trauma: Denies    Allergies:  Influenza virus vaccine and Niacin and related    Mental Status  Appearance: Poorly groomed, wearing hospital attire. Made good eye contact. Behavior: Calm, cooperative, friendly, and socially appropriate. No psychomotor retardation/agitation appreciated. Gait was not evaluated, as patient was lying down on the bed. Speech: Normal in tone, volume, and quality. No pressured speech noted  Mood: ' Okay\"   Affect: Mood congruent. Range is mildly restricted  Thought Process: Mostly linear and goal oriented. Thought Content: Patient does not have any current active suicidal and homicidal ideations. No overt delusions or paranoia appreciated. Perceptions: Seems patient does not have any auditory or visual hallucinations at present time. Patient did not appear to be responding to internal stimuli. No overt psychosis. Executive Functions: Appear mildly impaired. Concentration: Decreased. Orientation: to person, place, date, and situation. Alert. Language: Intact. Fund of information: Intact. Memory: recent and remote appear intact. Impulsivity: Questionable  Neurovegitative: Fair appetite, decreased sleep.    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 Currently patient is not suicidal, homicidal or psychotic. 2. Patient does have a capacity of making his own medical decisions. 3.  Primary treatment team has been started patient on Remeron 3.75 mg at bedtime for depression, anxiety, stimulation of appetite and insomnia. 4.  Recommended to continue to hold Cymbalta, as medication is contraindicated for patients with kidney insufficiency. 5.  Additionally, recommended doxepin 25 mg at bedtime for insomnia. 6.  At present time recommended to address patient's medical conditions, which could be a cause of worsening patient's mental status. 7.  Patient can be discharged from the hospital when he is medically stable.       Denny Isaacs MD

## 2022-06-19 ENCOUNTER — APPOINTMENT (OUTPATIENT)
Dept: GENERAL RADIOLOGY | Age: 67
DRG: 897 | End: 2022-06-19
Payer: MEDICAID

## 2022-06-19 PROCEDURE — 6370000000 HC RX 637 (ALT 250 FOR IP): Performed by: EMERGENCY MEDICINE

## 2022-06-19 PROCEDURE — 2580000003 HC RX 258: Performed by: INTERNAL MEDICINE

## 2022-06-19 PROCEDURE — 6370000000 HC RX 637 (ALT 250 FOR IP): Performed by: HOSPITALIST

## 2022-06-19 PROCEDURE — 94640 AIRWAY INHALATION TREATMENT: CPT

## 2022-06-19 PROCEDURE — 73600 X-RAY EXAM OF ANKLE: CPT | Performed by: RADIOLOGY

## 2022-06-19 PROCEDURE — 6370000000 HC RX 637 (ALT 250 FOR IP): Performed by: NURSE PRACTITIONER

## 2022-06-19 PROCEDURE — 6360000002 HC RX W HCPCS: Performed by: INTERNAL MEDICINE

## 2022-06-19 PROCEDURE — 6370000000 HC RX 637 (ALT 250 FOR IP): Performed by: PSYCHIATRY & NEUROLOGY

## 2022-06-19 PROCEDURE — 73600 X-RAY EXAM OF ANKLE: CPT

## 2022-06-19 PROCEDURE — 2140000000 HC CCU INTERMEDIATE R&B

## 2022-06-19 PROCEDURE — 6370000000 HC RX 637 (ALT 250 FOR IP): Performed by: INTERNAL MEDICINE

## 2022-06-19 RX ADMIN — ASPIRIN 81 MG: 81 TABLET, COATED ORAL at 07:32

## 2022-06-19 RX ADMIN — ARFORMOTEROL TARTRATE 15 MCG: 15 SOLUTION RESPIRATORY (INHALATION) at 18:46

## 2022-06-19 RX ADMIN — MIRTAZAPINE 3.75 MG: 7.5 TABLET ORAL at 21:24

## 2022-06-19 RX ADMIN — ACETAMINOPHEN 650 MG: 325 TABLET ORAL at 16:40

## 2022-06-19 RX ADMIN — SODIUM BICARBONATE 650 MG: 650 TABLET ORAL at 16:57

## 2022-06-19 RX ADMIN — METOPROLOL 12.5 MG: 25 TABLET ORAL at 21:25

## 2022-06-19 RX ADMIN — SODIUM BICARBONATE 650 MG: 650 TABLET ORAL at 07:32

## 2022-06-19 RX ADMIN — DOXEPIN HYDROCHLORIDE 25 MG: 25 CAPSULE ORAL at 21:25

## 2022-06-19 RX ADMIN — SODIUM BICARBONATE 650 MG: 650 TABLET ORAL at 21:24

## 2022-06-19 RX ADMIN — PANTOPRAZOLE SODIUM 40 MG: 40 TABLET, DELAYED RELEASE ORAL at 05:36

## 2022-06-19 RX ADMIN — Medication 3 MG: at 21:25

## 2022-06-19 RX ADMIN — SODIUM CHLORIDE, PRESERVATIVE FREE 10 ML: 5 INJECTION INTRAVENOUS at 07:35

## 2022-06-19 RX ADMIN — FOLIC ACID 1 MG: 1 TABLET ORAL at 07:32

## 2022-06-19 RX ADMIN — SODIUM CHLORIDE, PRESERVATIVE FREE 10 ML: 5 INJECTION INTRAVENOUS at 21:26

## 2022-06-19 RX ADMIN — NIFEDIPINE 30 MG: 30 TABLET, EXTENDED RELEASE ORAL at 07:32

## 2022-06-19 RX ADMIN — CLOPIDOGREL BISULFATE 75 MG: 75 TABLET ORAL at 07:32

## 2022-06-19 RX ADMIN — METOPROLOL 12.5 MG: 25 TABLET ORAL at 07:32

## 2022-06-19 RX ADMIN — ATORVASTATIN CALCIUM 40 MG: 40 TABLET, FILM COATED ORAL at 07:32

## 2022-06-19 RX ADMIN — THIAMINE HCL TAB 100 MG 100 MG: 100 TAB at 07:32

## 2022-06-19 RX ADMIN — ENOXAPARIN SODIUM 40 MG: 100 INJECTION SUBCUTANEOUS at 21:25

## 2022-06-19 RX ADMIN — BUDESONIDE 500 MCG: 0.5 SUSPENSION RESPIRATORY (INHALATION) at 18:46

## 2022-06-19 ASSESSMENT — PAIN - FUNCTIONAL ASSESSMENT: PAIN_FUNCTIONAL_ASSESSMENT: ACTIVITIES ARE NOT PREVENTED

## 2022-06-19 ASSESSMENT — PAIN DESCRIPTION - LOCATION: LOCATION: HEAD

## 2022-06-19 ASSESSMENT — PAIN SCALES - GENERAL
PAINLEVEL_OUTOF10: 8
PAINLEVEL_OUTOF10: 0

## 2022-06-19 ASSESSMENT — PAIN SCALES - WONG BAKER: WONGBAKER_NUMERICALRESPONSE: 0

## 2022-06-19 ASSESSMENT — PAIN DESCRIPTION - ORIENTATION: ORIENTATION: MID

## 2022-06-19 ASSESSMENT — PAIN DESCRIPTION - DESCRIPTORS: DESCRIPTORS: ACHING

## 2022-06-19 NOTE — PROGRESS NOTES
Progress Note  Date:2022       Room:0710/710-02  Patient Trudy Irizarry     YOB: 1955     Age:66 y.o. Subjective    Subjective: 42-year-old male with past medical history of alcohol abuse, cerebellar stroke, COPD, GERD, hypertension, hyperlipidemia, and CKD who presented to [facility] ED with the complaints of shortness of breath.  On arrival patient was poor historian and multiple prior ED visits were noted. Annabelle Villagomez reported drinking 1 pint of vodka within 24 hours of admission. Patient was found to be hypotensive in the ED was subsequently admitted to the ICU.  Patient required pressors and was gradually weaned off pressors with IV hydration.  Patient was then transferred to PCU. D-dimer noted to be elevated and VQ scan with low probibility.  Renal functions remained stable for patient. Annabelle Villagomez is on room air and tolerating well. Low-dose metoprolol added. PT/OT eval's were completed and indicated patient requiring moderate assistance and will need 24-hour supervision at discharge.  Social work discussed with patient and patient willing for short-term rehab before returning home. Social work continues to work on short term rehab placement. Patient has been declined at facilities in [facility] as well as [place]. Currently denies any acute issues in house and no documentations of events. Review of Systems: 12 point system reviewed and negative except as stated above. Objective         Vitals Last 24 Hours:  TEMPERATURE:  Temp  Av.5 °F (36.4 °C)  Min: 96.8 °F (36 °C)  Max: 97.7 °F (36.5 °C)  RESPIRATIONS RANGE: Resp  Av  Min: 16  Max: 20  PULSE OXIMETRY RANGE: SpO2  Av.8 %  Min: 91 %  Max: 100 %  PULSE RANGE: Pulse  Av  Min: 84  Max: 96  BLOOD PRESSURE RANGE: Systolic (45IQP), BKF:866 , Min:100 , ESZ:846   ; Diastolic (91BUX), ANDRY:45, Min:58, Max:92    I/O (24Hr):     Intake/Output Summary (Last 24 hours) at 2022 1124  Last data filed at 2022 2438  Gross per 24 hour   Intake 930 ml   Output 400 ml   Net 530 ml       Physical Examination:  General: Ill-appearing, well-developed, no acute distress lying comfortably in bed. HEENT: Atraumatic normocephalic, range of motion normal, no JVD, no tracheal deviation noted. Cardiac: Normal S1-S2   Respiratory: clear To auscultation bilaterally, no rhonchi or rales, no wheezing  Abdomen: Soft, positive bowel sounds in all quadrants, no distention, nontender to palpation  Extremities: no tenderness, no edema, moves all extremities  Psych: Affect normal and good eye contact, behavioral normal.        Labs/Imaging/Diagnostics    Labs:  CBC:  Recent Labs     06/17/22  0155 06/18/22 0202   WBC 5.9 5.8   RBC 3.48* 3.65*   HGB 12.3* 12.8*   HCT 38.6* 40.0*   .9* 109.6*   RDW 14.6* 14.4    171     CHEMISTRIES:  Recent Labs     06/17/22 0155 06/18/22 0202    140   K 3.7 3.7    103   CO2 24 22   BUN 50* 35*   CREATININE 2.0* 1.5*   GLUCOSE 123* 113*   MG 1.8 1.5*     PT/INR:No results for input(s): PROTIME, INR in the last 72 hours. APTT:No results for input(s): APTT in the last 72 hours. LIVER PROFILE:  Recent Labs     06/17/22 0155 06/18/22 0202   AST 39 86*   ALT 17 26   BILITOT 0.8 0.8   ALKPHOS 84 84       Imaging Last 24 Hours:  No results found. Assessment//Plan           Hospital Problems           Last Modified POA    * (Principal) Hypotension 6/12/2022 Yes    Essential hypertension (Chronic) 6/14/2022 Yes    Chronic kidney disease (CKD), stage IV (severe) (HCC) (Chronic) 6/14/2022 Yes    C[address]bellar stroke ([facility] [address] 6/14/2022 Yes    Alcohol abuse 6/14/2022 Yes        Assessment & Plan    Alcohol dependence with acute intoxication on admission: Now resolved  No evidence of withdrawal symptoms in-house  [facility] protocol discontinued  Continue thiamine and folic acid. Continue to monitor electrolytes closely.     CKD stage III: Stable    Tobacco dependence  Counseled on cessation    Hypertension: nifedipine. History of CVA  On aspirin Plavix and statin. PT OT following in-house    Transient hypotension with tachycardia and lactic acidosis on admission: Resolved    Left Ankle Pain  Xray ordered      Code: Full  DVT prophylaxis: Lovenox  Disposition: Awaiting SNF placement        Electronically signed by   Scotty Bethea MD   Internal Medicine Hospitalist  On 6/19/2022  At 11:24 AM    EMR Dragon/Transcription disclaimer:   Much of this encounter note is an electronic transcription/translation of spoken language to printed text.  The electronic translation of spoken language may permit erroneous, or at times, nonsensical words or phrases to be inadvertently transcribed; although attempts have made to review the note for such errors, some may still exist.

## 2022-06-20 VITALS
BODY MASS INDEX: 26.73 KG/M2 | OXYGEN SATURATION: 96 % | RESPIRATION RATE: 16 BRPM | HEART RATE: 78 BPM | TEMPERATURE: 96.8 F | WEIGHT: 180.5 LBS | SYSTOLIC BLOOD PRESSURE: 110 MMHG | HEIGHT: 69 IN | DIASTOLIC BLOOD PRESSURE: 73 MMHG

## 2022-06-20 LAB
ALBUMIN SERPL-MCNC: 3.3 G/DL (ref 3.5–5.2)
ALP BLD-CCNC: 77 U/L (ref 40–130)
ALT SERPL-CCNC: 53 U/L (ref 5–41)
ANION GAP SERPL CALCULATED.3IONS-SCNC: 14 MMOL/L (ref 7–19)
AST SERPL-CCNC: 133 U/L (ref 5–40)
BASOPHILS ABSOLUTE: 0.1 K/UL (ref 0–0.2)
BASOPHILS RELATIVE PERCENT: 1.1 % (ref 0–1)
BILIRUB SERPL-MCNC: 0.8 MG/DL (ref 0.2–1.2)
BUN BLDV-MCNC: 26 MG/DL (ref 8–23)
CALCIUM SERPL-MCNC: 8.8 MG/DL (ref 8.8–10.2)
CHLORIDE BLD-SCNC: 106 MMOL/L (ref 98–111)
CO2: 21 MMOL/L (ref 22–29)
CREAT SERPL-MCNC: 1.4 MG/DL (ref 0.5–1.2)
EOSINOPHILS ABSOLUTE: 0.1 K/UL (ref 0–0.6)
EOSINOPHILS RELATIVE PERCENT: 2.1 % (ref 0–5)
GFR AFRICAN AMERICAN: >59
GFR NON-AFRICAN AMERICAN: 51
GLUCOSE BLD-MCNC: 108 MG/DL (ref 74–109)
HCT VFR BLD CALC: 39.5 % (ref 42–52)
HEMOGLOBIN: 12 G/DL (ref 14–18)
IMMATURE GRANULOCYTES #: 0.1 K/UL
LYMPHOCYTES ABSOLUTE: 1 K/UL (ref 1.1–4.5)
LYMPHOCYTES RELATIVE PERCENT: 17.8 % (ref 20–40)
MAGNESIUM: 1.7 MG/DL (ref 1.6–2.4)
MCH RBC QN AUTO: 34 PG (ref 27–31)
MCHC RBC AUTO-ENTMCNC: 30.4 G/DL (ref 33–37)
MCV RBC AUTO: 111.9 FL (ref 80–94)
MONOCYTES ABSOLUTE: 0.5 K/UL (ref 0–0.9)
MONOCYTES RELATIVE PERCENT: 9.1 % (ref 0–10)
NEUTROPHILS ABSOLUTE: 3.9 K/UL (ref 1.5–7.5)
NEUTROPHILS RELATIVE PERCENT: 68.8 % (ref 50–65)
PDW BLD-RTO: 14.3 % (ref 11.5–14.5)
PLATELET # BLD: 179 K/UL (ref 130–400)
PMV BLD AUTO: 10.6 FL (ref 9.4–12.4)
POTASSIUM SERPL-SCNC: 4.3 MMOL/L (ref 3.5–5)
RBC # BLD: 3.53 M/UL (ref 4.7–6.1)
SODIUM BLD-SCNC: 141 MMOL/L (ref 136–145)
TOTAL PROTEIN: 5.6 G/DL (ref 6.6–8.7)
WBC # BLD: 5.6 K/UL (ref 4.8–10.8)

## 2022-06-20 PROCEDURE — 2580000003 HC RX 258: Performed by: INTERNAL MEDICINE

## 2022-06-20 PROCEDURE — 85025 COMPLETE CBC W/AUTO DIFF WBC: CPT

## 2022-06-20 PROCEDURE — 83735 ASSAY OF MAGNESIUM: CPT

## 2022-06-20 PROCEDURE — 6370000000 HC RX 637 (ALT 250 FOR IP): Performed by: EMERGENCY MEDICINE

## 2022-06-20 PROCEDURE — 97116 GAIT TRAINING THERAPY: CPT

## 2022-06-20 PROCEDURE — 6370000000 HC RX 637 (ALT 250 FOR IP): Performed by: HOSPITALIST

## 2022-06-20 PROCEDURE — 6370000000 HC RX 637 (ALT 250 FOR IP): Performed by: NURSE PRACTITIONER

## 2022-06-20 PROCEDURE — 36415 COLL VENOUS BLD VENIPUNCTURE: CPT

## 2022-06-20 PROCEDURE — 94640 AIRWAY INHALATION TREATMENT: CPT

## 2022-06-20 PROCEDURE — 80053 COMPREHEN METABOLIC PANEL: CPT

## 2022-06-20 PROCEDURE — 6370000000 HC RX 637 (ALT 250 FOR IP): Performed by: INTERNAL MEDICINE

## 2022-06-20 PROCEDURE — 6360000002 HC RX W HCPCS: Performed by: INTERNAL MEDICINE

## 2022-06-20 RX ORDER — THIAMINE MONONITRATE (VIT B1) 100 MG
100 TABLET ORAL DAILY
Qty: 30 TABLET | Refills: 3 | Status: SHIPPED | OUTPATIENT
Start: 2022-06-21 | End: 2022-07-21

## 2022-06-20 RX ORDER — NIFEDIPINE 30 MG/1
30 TABLET, EXTENDED RELEASE ORAL DAILY
Qty: 30 TABLET | Refills: 3 | Status: SHIPPED | OUTPATIENT
Start: 2022-06-21

## 2022-06-20 RX ORDER — MIRTAZAPINE 7.5 MG/1
3.75 TABLET, FILM COATED ORAL NIGHTLY
Qty: 30 TABLET | Refills: 3 | Status: SHIPPED | OUTPATIENT
Start: 2022-06-20

## 2022-06-20 RX ORDER — SODIUM BICARBONATE 650 MG/1
650 TABLET ORAL 4 TIMES DAILY
Qty: 120 TABLET | Refills: 0 | Status: SHIPPED | OUTPATIENT
Start: 2022-06-20 | End: 2022-07-20

## 2022-06-20 RX ORDER — LANOLIN ALCOHOL/MO/W.PET/CERES
3 CREAM (GRAM) TOPICAL NIGHTLY
Qty: 30 TABLET | Refills: 3 | Status: SHIPPED | OUTPATIENT
Start: 2022-06-20 | End: 2022-07-20

## 2022-06-20 RX ORDER — FOLIC ACID 1 MG/1
1 TABLET ORAL DAILY
Qty: 30 TABLET | Refills: 3 | Status: SHIPPED | OUTPATIENT
Start: 2022-06-21

## 2022-06-20 RX ORDER — DOXEPIN HYDROCHLORIDE 25 MG/1
25 CAPSULE ORAL NIGHTLY
Qty: 30 CAPSULE | Refills: 3 | Status: SHIPPED | OUTPATIENT
Start: 2022-06-20

## 2022-06-20 RX ADMIN — SODIUM BICARBONATE 650 MG: 650 TABLET ORAL at 15:33

## 2022-06-20 RX ADMIN — SODIUM BICARBONATE 650 MG: 650 TABLET ORAL at 08:59

## 2022-06-20 RX ADMIN — NIFEDIPINE 30 MG: 30 TABLET, EXTENDED RELEASE ORAL at 08:58

## 2022-06-20 RX ADMIN — ARFORMOTEROL TARTRATE 15 MCG: 15 SOLUTION RESPIRATORY (INHALATION) at 06:26

## 2022-06-20 RX ADMIN — THIAMINE HCL TAB 100 MG 100 MG: 100 TAB at 08:59

## 2022-06-20 RX ADMIN — FOLIC ACID 1 MG: 1 TABLET ORAL at 08:59

## 2022-06-20 RX ADMIN — METOPROLOL 12.5 MG: 25 TABLET ORAL at 08:59

## 2022-06-20 RX ADMIN — CLOPIDOGREL BISULFATE 75 MG: 75 TABLET ORAL at 08:59

## 2022-06-20 RX ADMIN — ASPIRIN 81 MG: 81 TABLET, COATED ORAL at 08:58

## 2022-06-20 RX ADMIN — ATORVASTATIN CALCIUM 40 MG: 40 TABLET, FILM COATED ORAL at 08:59

## 2022-06-20 RX ADMIN — PANTOPRAZOLE SODIUM 40 MG: 40 TABLET, DELAYED RELEASE ORAL at 08:59

## 2022-06-20 RX ADMIN — SODIUM CHLORIDE, PRESERVATIVE FREE 10 ML: 5 INJECTION INTRAVENOUS at 08:59

## 2022-06-20 RX ADMIN — BUDESONIDE 500 MCG: 0.5 SUSPENSION RESPIRATORY (INHALATION) at 06:26

## 2022-06-20 ASSESSMENT — PAIN DESCRIPTION - DESCRIPTORS: DESCRIPTORS: ACHING

## 2022-06-20 ASSESSMENT — PAIN DESCRIPTION - PAIN TYPE: TYPE: ACUTE PAIN

## 2022-06-20 ASSESSMENT — PAIN DESCRIPTION - FREQUENCY: FREQUENCY: CONTINUOUS

## 2022-06-20 ASSESSMENT — PAIN - FUNCTIONAL ASSESSMENT: PAIN_FUNCTIONAL_ASSESSMENT: ACTIVITIES ARE NOT PREVENTED

## 2022-06-20 ASSESSMENT — PAIN DESCRIPTION - LOCATION: LOCATION: FOOT

## 2022-06-20 ASSESSMENT — PAIN DESCRIPTION - ORIENTATION: ORIENTATION: LEFT

## 2022-06-20 NOTE — PROGRESS NOTES
06/20/22 1300   Subjective   Subjective My L foot hurt hurts more when I walk.    Pain Assessment   Pain Assessment 0-10   Patient's Stated Pain Goal 0 - No pain   Pain Location Foot   Pain Orientation Left   Pain Descriptors Aching   Functional Pain Assessment Activities are not prevented   Pain Type Acute pain   Pain Frequency Continuous  (More with WB)   Non-Pharmaceutical Pain Intervention(s) Rest   Response to Pain Intervention Patient satisfied   Orientation   Overall Orientation Status WFL   Vitals   O2 Device None (Room air)   Bed Mobility Training   Supine to Sit Stand-by assistance  (With [facility] raised)   Sit to Supine Stand-by assistance  (With [facility] raised)   Transfer Training   Transfer Training Yes   Overall Level of Assistance Stand-by assistance   Sit to Stand Stand-by assistance   Stand to Sit Stand-by assistance   Gait Training   Gait Training Yes   Gait   Overall Level of Assistance Stand-by assistance   Speed/[place] Slow   Distance (ft) 100 Feet   Assistive Device Walker, rolling   Patient Education   Education Given To Patient   Education Provided Role of Therapy;Transfer Training;Equipment   Education Method Verbal;Demonstration   Education Outcome Verbalized understanding   Plan   Plan Comment Patient BTB with call light; Bed alarm   PT Plan of Care   Monday X   Physical Therapy    Electronically signed by Conner Myers PTA on 6/20/2022 at 2:03 PM

## 2022-06-20 NOTE — CARE COORDINATION
HH referral received. [place], which is not accepted by any [facility] agency in patient's area. Unable to set up [facility] services at this time.   Electronically signed by Raphael Ribera on 6/20/22 at 9:15 AM CDT

## 2022-06-20 NOTE — CARE COORDINATION
SW/CM was pursuing SNF placement for pt. Pt has decided that he would rather dc home with home health. Updated [facility] of pt's new dc plan.    Electronically signed by Danny Galindo on 6/20/2022 at 8:26 AM

## 2022-06-20 NOTE — CARE COORDINATION
CM requested to assist patient with transport home on the Smart bus. Last bus of the day is at 1600. Pt states, he cannot go home today, \"someone took my clothes to laundry\" Pt states, he has a key to home, \"but door is unlocked\" Pt states, \"he has food at home and some to help him when he gets home\" Pt states, \"his phone needs to be charged and cannot reach anyone by phone\" Pt \"he had and ankle of his foot yesterday, and has not been out of bed\" Pt states \"he does not have a walker at home\" Patient states. \" I have not been out of bed, only to the bathroom\" CM revisited w/patient option of going to a SNF, as HC will not be an option d/t his home location. CM requested PT to walk patient and recommend walking aide. Per PT, patient will need a RW for home use. At this time, xray of foot is pending results. CM has tentative scheduled SMART bus for 1600.  If patient is not d/c today, bus ride needs to be cancelled by , and rescheduled ro 6/21/2022  Electronically signed by Onel Becker RN on 6/20/2022 at 1:57 PM

## 2022-06-20 NOTE — PROGRESS NOTES
Discharge instructions reviewed with patient. Pt requested different pharmacy than was listed in his chart. Nurse calls his pharmacy and has medications sent from [facility] to [facility]. IV and telemetry box removed.

## 2022-06-20 NOTE — CARE COORDINATION
CM requested to provide patient with RW prior to discharge today at [address], [address] [facility] #[id number] ([id number]) (WHX:[id number] 61 y.o. M) (Adm: 06/12/22)  [facility] [id number]    PCP    None    Demographics  Comment        Address Home Phone Work Phone Mobile Phone    [address] [place] [address]   [address]   [address] [phone]    Social Security Number Insurance Information Marital Status Voodoo     Rahu 37 Status   No [[id number]]       Insurance Payors as of 6/20/2022      [facility] MANAGED MEDICAID    Plan: Tiffanie Delaney Member: [id number] Effective from: 6/12/2022   Subscriber: Leandro [place] ID: [id number] Guarantor: Christiano Muro       Documents Filed to Patient    Power of  Living Will Clinical Unknown Study Attachment Consent Form ABN Waiver After Visit Summary Lab Result Scan Code Status MyChart Status Advance Care Planning    Not on File  Not on File  Not on File  Not on File  Not on File  Not on File  Filed  Not on File  FULL [Updated on 06/20/22 0833]  Pending Jump to the Activity        Auth/Cert Information     Open Auth/Cert linked to Hospital Account [de-identified]       Emergency Contact(s)    None on File     Admission Information      Current Information    Attending Provider Admitting Provider Admission Type Admission Status   Kai Corona MD  Emergency Confirmed Admission          Admission Date/Time Discharge Date Hospital Service Auth/Cert Status   98/94/81  02:02 PM  [facility]    [address] [facility] [address] [id number] [facility]    Name Acct ID Class Status Primary Coverage   Luis Fernando Zambrano [id number] Inpatient Open [address]            Guarantor Account (for Hospital Account [de-identified])    Name Relation to Pt Service Area Active?  Acct Type

## 2022-06-20 NOTE — CARE COORDINATION
[facility] does not take patient's insurance. FAWAD reached out to [facility] to review order for walker. CM awaiting response.   Electronically signed by Linwood Haynes, RN on 6/20/2022 at 2:50 PM   CM advised patient walker is covered b insurance, and Mary Anne Mcnally will bring to his room  Electronically signed by Linwood Haynes RN on 6/20/2022 at 3:26 PM

## 2022-06-20 NOTE — DISCHARGE SUMMARY
Discharge Summary      Date:6/20/2022        Patient Matthew Lopez     YOB: 1955     Age:66 y.o. Admit Date:6/12/2022   Admission Condition:fair   Discharged Condition:stable  Discharge Date: 06/20/22       Discharge Diagnoses   Principal Problem:    Hypotension  Active Problems:    Essential hypertension    Chronic kidney disease (CKD), stage IV (severe) (HCC)    Cerebellar stroke (HCC)    Alcohol abuse  Resolved Problems:    * No resolved hospital problems. [facility] Stay   Narrative of Hospital Course:     81-year-old male with past medical history of alcohol abuse, cerebellar stroke, COPD, GERD, hypertension, hyperlipidemia, and CKD who presented to [facility] ED with the complaints of shortness of breath.  On arrival patient was poor historian and multiple prior ED visits were noted.  Patient reported drinking 1 pint of vodka within 24 hours of admission. Patient was found to be hypotensive in the ED was subsequently admitted to the ICU.  Patient required pressors and was gradually weaned off pressors with IV hydration. Esperanza Ashley was then transferred to PCU. D-dimer noted to be elevated and VQ scan with low probibility.  Renal functions remained stable for patient.  PT/OT eval's were completed and indicated patient requiring moderate assistance and will need 24-hour supervision at discharge.  For attempted placing patient at SNF however patient extremely difficult and has been declined at multiple facilities. Patient elected for home discharge with home health. Complaint of left ankle pain in-house, x-ray obtained however results not available prior to discharge to follow-up with PCP. Adjustments were made to patient's medications as indicated below, to follow-up with PCP for continued management of chronic medical problems. Physical Examination:  General: Ill-appearing, well-developed, no acute distress lying comfortably in bed.   HEENT: Atraumatic normocephalic, range of motion normal, no JVD, no tracheal deviation noted. Cardiac: Normal S1-S2   Respiratory: clear To auscultation bilaterally, no rhonchi or rales, no wheezing  Abdomen: Soft, positive bowel sounds in all quadrants, no distention, nontender to palpation  Extremities: no tenderness, no edema, moves all extremities  Psych: Affect normal and good eye contact, behavioral normal.      Consultants:   IP CONSULT TO SOCIAL WORK  IP CONSULT TO CASE MANAGEMENT  IP CONSULT TO CASE MANAGEMENT  IP CONSULT TO PSYCHIATRY  IP CONSULT TO HOME CARE NEEDS    Time Spent on Discharge:  35 minutes were spent in patient examination, evaluation, counseling as well as medication reconciliation, prescriptions for required medications, discharge plan and follow up. Surgeries/Procedures Performed:  NONE      Significant Diagnostic Studies:   Recent Labs:  CBC:   Lab Results   Component Value Date    WBC 5.6 06/20/2022    RBC 3.53 06/20/2022    HGB 12.0 06/20/2022    HCT 39.5 06/20/2022    .9 06/20/2022    MCH 34.0 06/20/2022    MCHC 30.4 06/20/2022    RDW 14.3 06/20/2022     06/20/2022     BMP:    Lab Results   Component Value Date    GLUCOSE 108 06/20/2022     06/20/2022    K 4.3 06/20/2022    K 4.4 06/12/2022     06/20/2022    CO2 21 06/20/2022    ANIONGAP 14 06/20/2022    BUN 26 06/20/2022    CREATININE 1.4 06/20/2022    CALCIUM 8.8 06/20/2022    LABGLOM 51 06/20/2022    GFRAA >59 06/20/2022       Radiology Last 7 Days:  NM LUNG SCAN PERFUSION ONLY    Result Date: 6/14/2022  The study appearance is consistent with a low probability for pulmonary embolism. Please note according to the PIOPED criteria a low probability reflects a 0 to 19%chance of a pulmonary embolism. Please correlate clinically. If symptoms persist, consider CT PA gram. RECOMMENDATION: Follow up as clinically indicated.  Electronically Signed by Snehal Lora MD at 14-Jun-2022 07:21:18 PM               Discharge Plan   Disposition: Home Health    Provider Follow-Up:   PCP    Call in 1 day  Hospital follow-up        Patient Instructions   Diet: cardiac diet and renal diet    Activity: activity as tolerated      Discharge Medications         Medication List      START taking these medications    doxepin 25 MG capsule  Commonly known as: SINEQUAN  Take 1 capsule by mouth nightly     folic acid 1 MG tablet  Commonly known as: FOLVITE  Take 1 tablet by mouth daily  Start taking on: June 21, 2022     melatonin 3 MG Tabs tablet  Take 1 tablet by mouth at bedtime     metoprolol tartrate 25 MG tablet  Commonly known as: LOPRESSOR  Take 0.5 tablets by mouth 2 times daily     mirtazapine 7.5 MG tablet  Commonly known as: REMERON  Take 0.5 tablets by mouth nightly     NIFEdipine 30 MG extended release tablet  Commonly known as: PROCARDIA XL  Take 1 tablet by mouth daily  Start taking on: June 21, 2022     sodium bicarbonate 650 MG tablet  Take 1 tablet by mouth 4 times daily     vitamin B-1 100 MG tablet  Commonly known as: THIAMINE  Take 1 tablet by mouth daily  Start taking on: June 21, 2022        Conchetta Nyhan taking these medications    aspirin 81 MG EC tablet     atorvastatin 40 MG tablet  Commonly known as: LIPITOR     clopidogrel 75 MG tablet  Commonly known as: PLAVIX  Take 1 tablet by mouth daily     fluticasone-salmeterol 250-50 MCG/DOSE Aepb  Commonly known as: ADVAIR     omeprazole 20 MG delayed release capsule  Commonly known as: PRILOSEC  Take 1 capsule by mouth daily        STOP taking these medications    diphenhydrAMINE 25 MG tablet  Commonly known as: BENADRYL     DULoxetine 30 MG extended release capsule  Commonly known as: CYMBALTA     lisinopril 20 MG tablet  Commonly known as: PRINIVIL;ZESTRIL           Where to Get Your Medications      These medications were sent [address], [address]  [address]  [address] [facility], [address] [facility]    Phone: [phone]   · doxepin 25 MG

## 2023-08-27 ENCOUNTER — APPOINTMENT (OUTPATIENT)
Dept: GENERAL RADIOLOGY | Age: 68
End: 2023-08-27
Payer: MEDICARE

## 2023-08-27 ENCOUNTER — HOSPITAL ENCOUNTER (INPATIENT)
Age: 68
LOS: 12 days | Discharge: NURSING, REHAB OR LONG-TERM CARE | End: 2023-09-08
Attending: PEDIATRICS | Admitting: INTERNAL MEDICINE
Payer: MEDICARE

## 2023-08-27 ENCOUNTER — APPOINTMENT (OUTPATIENT)
Dept: NUCLEAR MEDICINE | Age: 68
End: 2023-08-27
Payer: MEDICARE

## 2023-08-27 DIAGNOSIS — R09.89 DECREASED PULSE: ICD-10-CM

## 2023-08-27 DIAGNOSIS — J44.1 COPD EXACERBATION (HCC): Primary | ICD-10-CM

## 2023-08-27 DIAGNOSIS — I50.9 CONGESTIVE HEART FAILURE, UNSPECIFIED HF CHRONICITY, UNSPECIFIED HEART FAILURE TYPE (HCC): ICD-10-CM

## 2023-08-27 DIAGNOSIS — M79.609 PAIN IN EXTREMITY, UNSPECIFIED EXTREMITY: ICD-10-CM

## 2023-08-27 PROBLEM — N18.30 CKD (CHRONIC KIDNEY DISEASE), STAGE III (HCC): Status: ACTIVE | Noted: 2023-08-27

## 2023-08-27 PROBLEM — E87.3 RESPIRATORY ALKALOSIS: Status: ACTIVE | Noted: 2023-08-27

## 2023-08-27 LAB
25(OH)D3 SERPL-MCNC: 16.2 NG/ML
ALBUMIN SERPL-MCNC: 3.8 G/DL (ref 3.5–5.2)
ALLENS TEST: ABNORMAL
ALP SERPL-CCNC: 66 U/L (ref 40–130)
ALT SERPL-CCNC: 9 U/L (ref 5–41)
ANION GAP SERPL CALCULATED.3IONS-SCNC: 24 MMOL/L (ref 7–19)
AST SERPL-CCNC: 23 U/L (ref 5–40)
BASE EXCESS ARTERIAL: -10.6 MMOL/L (ref -2–2)
BASOPHILS # BLD: 0 K/UL (ref 0–0.2)
BASOPHILS NFR BLD: 0.4 % (ref 0–1)
BILIRUB SERPL-MCNC: 0.9 MG/DL (ref 0.2–1.2)
BNP BLD-MCNC: ABNORMAL PG/ML (ref 0–124)
BUN SERPL-MCNC: 61 MG/DL (ref 8–23)
CALCIUM SERPL-MCNC: 9.4 MG/DL (ref 8.8–10.2)
CARBOXYHEMOGLOBIN ARTERIAL: 2 % (ref 0–5)
CHLORIDE SERPL-SCNC: 96 MMOL/L (ref 98–111)
CO2 SERPL-SCNC: 13 MMOL/L (ref 22–29)
CREAT SERPL-MCNC: 1.8 MG/DL (ref 0.5–1.2)
D DIMER PPP FEU-MCNC: 7.4 UG/ML FEU (ref 0–0.48)
EOSINOPHIL # BLD: 0.1 K/UL (ref 0–0.6)
EOSINOPHIL NFR BLD: 1.3 % (ref 0–5)
ERYTHROCYTE [DISTWIDTH] IN BLOOD BY AUTOMATED COUNT: 15.2 % (ref 11.5–14.5)
ETHANOLAMINE SERPL-MCNC: <10 MG/DL (ref 0–0.08)
FOLATE SERPL-MCNC: <2 NG/ML (ref 4.5–32.2)
GLUCOSE SERPL-MCNC: 159 MG/DL (ref 74–109)
HBA1C MFR BLD: 6.4 % (ref 4–6)
HCO3 ARTERIAL: 10.9 MMOL/L (ref 22–26)
HCT VFR BLD AUTO: 29.7 % (ref 42–52)
HEMOGLOBIN, ART, EXTENDED: 10.3 G/DL (ref 14–18)
HGB BLD-MCNC: 9.7 G/DL (ref 14–18)
IMM GRANULOCYTES # BLD: 0.1 K/UL
IRON SATN MFR SERPL: 39 % (ref 14–50)
IRON SERPL-MCNC: 129 UG/DL (ref 59–158)
LACTATE BLDV-SCNC: 1.8 MMOL/L (ref 0.5–1.9)
LACTATE BLDV-SCNC: 2.5 MMOL/L (ref 0.5–1.9)
LYMPHOCYTES # BLD: 0.9 K/UL (ref 1.1–4.5)
LYMPHOCYTES NFR BLD: 11.1 % (ref 20–40)
MAGNESIUM SERPL-MCNC: 1.7 MG/DL (ref 1.6–2.4)
MCH RBC QN AUTO: 39.1 PG (ref 27–31)
MCHC RBC AUTO-ENTMCNC: 32.7 G/DL (ref 33–37)
MCV RBC AUTO: 119.8 FL (ref 80–94)
METHEMOGLOBIN ARTERIAL: 0.9 %
MONOCYTES # BLD: 0.3 K/UL (ref 0–0.9)
MONOCYTES NFR BLD: 3.4 % (ref 0–10)
NEUTROPHILS # BLD: 6.8 K/UL (ref 1.5–7.5)
NEUTS SEG NFR BLD: 82.2 % (ref 50–65)
O2 CONTENT ARTERIAL: 13.8 ML/DL
O2 DELIVERY DEVICE: ABNORMAL
O2 SAT, ARTERIAL: 94.9 %
O2 THERAPY: ABNORMAL
PCO2 ARTERIAL: 15 MMHG (ref 35–45)
PH ARTERIAL: 7.47 (ref 7.35–7.45)
PLATELET # BLD AUTO: 152 K/UL (ref 130–400)
PMV BLD AUTO: 11.7 FL (ref 9.4–12.4)
PO2 ARTERIAL: 77 MMHG (ref 80–100)
POTASSIUM BLD-SCNC: 3.7 MMOL/L
POTASSIUM SERPL-SCNC: 3.9 MMOL/L (ref 3.5–5)
PROCALCITONIN: 31.88 NG/ML (ref 0–0.09)
PROT SERPL-MCNC: 7.4 G/DL (ref 6.6–8.7)
RBC # BLD AUTO: 2.48 M/UL (ref 4.7–6.1)
SALICYLATES SERPL-MCNC: <0.3 MG/DL (ref 3–10)
SAMPLE SOURCE: ABNORMAL
SARS-COV-2 RDRP RESP QL NAA+PROBE: NOT DETECTED
SODIUM SERPL-SCNC: 133 MMOL/L (ref 136–145)
TIBC SERPL-MCNC: 334 UG/DL (ref 250–400)
TROPONIN T SERPL-MCNC: 0.01 NG/ML (ref 0–0.03)
TROPONIN T SERPL-MCNC: <0.01 NG/ML (ref 0–0.03)
VIT B12 SERPL-MCNC: 654 PG/ML (ref 211–946)
WBC # BLD AUTO: 8.2 K/UL (ref 4.8–10.8)

## 2023-08-27 PROCEDURE — 80053 COMPREHEN METABOLIC PANEL: CPT

## 2023-08-27 PROCEDURE — 73630 X-RAY EXAM OF FOOT: CPT

## 2023-08-27 PROCEDURE — 6370000000 HC RX 637 (ALT 250 FOR IP)

## 2023-08-27 PROCEDURE — 87040 BLOOD CULTURE FOR BACTERIA: CPT

## 2023-08-27 PROCEDURE — 80179 DRUG ASSAY SALICYLATE: CPT

## 2023-08-27 PROCEDURE — 71045 X-RAY EXAM CHEST 1 VIEW: CPT

## 2023-08-27 PROCEDURE — 87635 SARS-COV-2 COVID-19 AMP PRB: CPT

## 2023-08-27 PROCEDURE — 82077 ASSAY SPEC XCP UR&BREATH IA: CPT

## 2023-08-27 PROCEDURE — 85025 COMPLETE CBC W/AUTO DIFF WBC: CPT

## 2023-08-27 PROCEDURE — 83540 ASSAY OF IRON: CPT

## 2023-08-27 PROCEDURE — 36415 COLL VENOUS BLD VENIPUNCTURE: CPT

## 2023-08-27 PROCEDURE — 83735 ASSAY OF MAGNESIUM: CPT

## 2023-08-27 PROCEDURE — 82746 ASSAY OF FOLIC ACID SERUM: CPT

## 2023-08-27 PROCEDURE — 36600 WITHDRAWAL OF ARTERIAL BLOOD: CPT

## 2023-08-27 PROCEDURE — 94640 AIRWAY INHALATION TREATMENT: CPT

## 2023-08-27 PROCEDURE — 96375 TX/PRO/DX INJ NEW DRUG ADDON: CPT

## 2023-08-27 PROCEDURE — 6360000002 HC RX W HCPCS

## 2023-08-27 PROCEDURE — 84145 PROCALCITONIN (PCT): CPT

## 2023-08-27 PROCEDURE — 83605 ASSAY OF LACTIC ACID: CPT

## 2023-08-27 PROCEDURE — 99285 EMERGENCY DEPT VISIT HI MDM: CPT

## 2023-08-27 PROCEDURE — A9540 TC99M MAA: HCPCS | Performed by: PEDIATRICS

## 2023-08-27 PROCEDURE — 3430000000 HC RX DIAGNOSTIC RADIOPHARMACEUTICAL: Performed by: PEDIATRICS

## 2023-08-27 PROCEDURE — 85379 FIBRIN DEGRADATION QUANT: CPT

## 2023-08-27 PROCEDURE — 83550 IRON BINDING TEST: CPT

## 2023-08-27 PROCEDURE — 96365 THER/PROPH/DIAG IV INF INIT: CPT

## 2023-08-27 PROCEDURE — 83880 ASSAY OF NATRIURETIC PEPTIDE: CPT

## 2023-08-27 PROCEDURE — 2580000003 HC RX 258: Performed by: PEDIATRICS

## 2023-08-27 PROCEDURE — 78580 LUNG PERFUSION IMAGING: CPT

## 2023-08-27 PROCEDURE — 82803 BLOOD GASES ANY COMBINATION: CPT

## 2023-08-27 PROCEDURE — 93005 ELECTROCARDIOGRAM TRACING: CPT | Performed by: PEDIATRICS

## 2023-08-27 PROCEDURE — 2140000000 HC CCU INTERMEDIATE R&B

## 2023-08-27 PROCEDURE — 2580000003 HC RX 258

## 2023-08-27 PROCEDURE — 84484 ASSAY OF TROPONIN QUANT: CPT

## 2023-08-27 PROCEDURE — 83036 HEMOGLOBIN GLYCOSYLATED A1C: CPT

## 2023-08-27 PROCEDURE — 6360000002 HC RX W HCPCS: Performed by: PEDIATRICS

## 2023-08-27 PROCEDURE — 82607 VITAMIN B-12: CPT

## 2023-08-27 PROCEDURE — 82306 VITAMIN D 25 HYDROXY: CPT

## 2023-08-27 PROCEDURE — 2500000003 HC RX 250 WO HCPCS: Performed by: PEDIATRICS

## 2023-08-27 PROCEDURE — 2700000000 HC OXYGEN THERAPY PER DAY

## 2023-08-27 RX ORDER — LANOLIN ALCOHOL/MO/W.PET/CERES
3 CREAM (GRAM) TOPICAL NIGHTLY
Status: DISCONTINUED | OUTPATIENT
Start: 2023-08-27 | End: 2023-09-08 | Stop reason: HOSPADM

## 2023-08-27 RX ORDER — ACETAMINOPHEN 325 MG/1
650 TABLET ORAL EVERY 6 HOURS PRN
Status: DISCONTINUED | OUTPATIENT
Start: 2023-08-27 | End: 2023-09-08 | Stop reason: HOSPADM

## 2023-08-27 RX ORDER — CLOPIDOGREL BISULFATE 75 MG/1
75 TABLET ORAL DAILY
Status: DISCONTINUED | OUTPATIENT
Start: 2023-08-27 | End: 2023-09-08 | Stop reason: HOSPADM

## 2023-08-27 RX ORDER — ATORVASTATIN CALCIUM 40 MG/1
40 TABLET, FILM COATED ORAL DAILY
Status: DISCONTINUED | OUTPATIENT
Start: 2023-08-27 | End: 2023-08-28

## 2023-08-27 RX ORDER — PANTOPRAZOLE SODIUM 40 MG/1
40 TABLET, DELAYED RELEASE ORAL
Status: DISCONTINUED | OUTPATIENT
Start: 2023-08-28 | End: 2023-09-08 | Stop reason: HOSPADM

## 2023-08-27 RX ORDER — SODIUM CHLORIDE 0.9 % (FLUSH) 0.9 %
5-40 SYRINGE (ML) INJECTION PRN
Status: DISCONTINUED | OUTPATIENT
Start: 2023-08-27 | End: 2023-09-08 | Stop reason: HOSPADM

## 2023-08-27 RX ORDER — ARFORMOTEROL TARTRATE 15 UG/2ML
15 SOLUTION RESPIRATORY (INHALATION)
Status: DISCONTINUED | OUTPATIENT
Start: 2023-08-27 | End: 2023-09-08 | Stop reason: HOSPADM

## 2023-08-27 RX ORDER — IPRATROPIUM BROMIDE AND ALBUTEROL SULFATE 2.5; .5 MG/3ML; MG/3ML
SOLUTION RESPIRATORY (INHALATION)
Status: COMPLETED
Start: 2023-08-27 | End: 2023-08-27

## 2023-08-27 RX ORDER — DOXEPIN HYDROCHLORIDE 25 MG/1
25 CAPSULE ORAL NIGHTLY
Status: DISCONTINUED | OUTPATIENT
Start: 2023-08-27 | End: 2023-09-08 | Stop reason: HOSPADM

## 2023-08-27 RX ORDER — ENOXAPARIN SODIUM 100 MG/ML
1 INJECTION SUBCUTANEOUS DAILY
Status: DISCONTINUED | OUTPATIENT
Start: 2023-08-27 | End: 2023-08-27

## 2023-08-27 RX ORDER — ASPIRIN 81 MG/1
81 TABLET ORAL DAILY
Status: DISCONTINUED | OUTPATIENT
Start: 2023-08-27 | End: 2023-09-08 | Stop reason: HOSPADM

## 2023-08-27 RX ORDER — LORAZEPAM 2 MG/ML
3 INJECTION INTRAMUSCULAR
Status: DISCONTINUED | OUTPATIENT
Start: 2023-08-27 | End: 2023-08-28

## 2023-08-27 RX ORDER — IPRATROPIUM BROMIDE AND ALBUTEROL SULFATE 2.5; .5 MG/3ML; MG/3ML
1 SOLUTION RESPIRATORY (INHALATION)
Status: DISCONTINUED | OUTPATIENT
Start: 2023-08-27 | End: 2023-08-27

## 2023-08-27 RX ORDER — PREDNISONE 20 MG/1
40 TABLET ORAL DAILY
Status: DISCONTINUED | OUTPATIENT
Start: 2023-08-30 | End: 2023-08-29

## 2023-08-27 RX ORDER — LORAZEPAM 2 MG/1
4 TABLET ORAL
Status: DISCONTINUED | OUTPATIENT
Start: 2023-08-27 | End: 2023-08-28

## 2023-08-27 RX ORDER — SODIUM CHLORIDE 0.9 % (FLUSH) 0.9 %
5-40 SYRINGE (ML) INJECTION EVERY 12 HOURS SCHEDULED
Status: DISCONTINUED | OUTPATIENT
Start: 2023-08-27 | End: 2023-09-08 | Stop reason: HOSPADM

## 2023-08-27 RX ORDER — MIRTAZAPINE 7.5 MG/1
3.75 TABLET, FILM COATED ORAL NIGHTLY
Status: DISCONTINUED | OUTPATIENT
Start: 2023-08-27 | End: 2023-09-06

## 2023-08-27 RX ORDER — BUDESONIDE 0.25 MG/2ML
0.25 INHALANT ORAL
Status: DISCONTINUED | OUTPATIENT
Start: 2023-08-27 | End: 2023-09-08 | Stop reason: HOSPADM

## 2023-08-27 RX ORDER — NICOTINE 21 MG/24HR
1 PATCH, TRANSDERMAL 24 HOURS TRANSDERMAL DAILY
Status: DISCONTINUED | OUTPATIENT
Start: 2023-08-27 | End: 2023-09-08 | Stop reason: HOSPADM

## 2023-08-27 RX ORDER — 0.9 % SODIUM CHLORIDE 0.9 %
1000 INTRAVENOUS SOLUTION INTRAVENOUS ONCE
Status: COMPLETED | OUTPATIENT
Start: 2023-08-27 | End: 2023-08-27

## 2023-08-27 RX ORDER — LORAZEPAM 2 MG/1
2 TABLET ORAL
Status: DISCONTINUED | OUTPATIENT
Start: 2023-08-27 | End: 2023-08-28

## 2023-08-27 RX ORDER — ONDANSETRON 2 MG/ML
4 INJECTION INTRAMUSCULAR; INTRAVENOUS EVERY 6 HOURS PRN
Status: DISCONTINUED | OUTPATIENT
Start: 2023-08-27 | End: 2023-09-08 | Stop reason: HOSPADM

## 2023-08-27 RX ORDER — ONDANSETRON 4 MG/1
4 TABLET, ORALLY DISINTEGRATING ORAL EVERY 8 HOURS PRN
Status: DISCONTINUED | OUTPATIENT
Start: 2023-08-27 | End: 2023-09-08 | Stop reason: HOSPADM

## 2023-08-27 RX ORDER — SODIUM CHLORIDE 9 MG/ML
INJECTION, SOLUTION INTRAVENOUS PRN
Status: DISCONTINUED | OUTPATIENT
Start: 2023-08-27 | End: 2023-09-08 | Stop reason: HOSPADM

## 2023-08-27 RX ORDER — LORAZEPAM 2 MG/ML
1 INJECTION INTRAMUSCULAR
Status: DISCONTINUED | OUTPATIENT
Start: 2023-08-27 | End: 2023-08-28

## 2023-08-27 RX ORDER — LORAZEPAM 2 MG/ML
4 INJECTION INTRAMUSCULAR
Status: DISCONTINUED | OUTPATIENT
Start: 2023-08-27 | End: 2023-08-28

## 2023-08-27 RX ORDER — BUMETANIDE 0.25 MG/ML
1 INJECTION INTRAMUSCULAR; INTRAVENOUS ONCE
Status: COMPLETED | OUTPATIENT
Start: 2023-08-27 | End: 2023-08-27

## 2023-08-27 RX ORDER — FOLIC ACID 1 MG/1
1 TABLET ORAL DAILY
Status: DISCONTINUED | OUTPATIENT
Start: 2023-08-27 | End: 2023-09-08 | Stop reason: HOSPADM

## 2023-08-27 RX ORDER — ENOXAPARIN SODIUM 100 MG/ML
40 INJECTION SUBCUTANEOUS DAILY
Status: DISCONTINUED | OUTPATIENT
Start: 2023-08-27 | End: 2023-09-08 | Stop reason: HOSPADM

## 2023-08-27 RX ORDER — LORAZEPAM 1 MG/1
1 TABLET ORAL
Status: DISCONTINUED | OUTPATIENT
Start: 2023-08-27 | End: 2023-08-28

## 2023-08-27 RX ORDER — ALBUTEROL SULFATE 2.5 MG/3ML
2.5 SOLUTION RESPIRATORY (INHALATION) EVERY 4 HOURS PRN
Status: DISCONTINUED | OUTPATIENT
Start: 2023-08-27 | End: 2023-08-28

## 2023-08-27 RX ORDER — LORAZEPAM 2 MG/ML
2 INJECTION INTRAMUSCULAR
Status: DISCONTINUED | OUTPATIENT
Start: 2023-08-27 | End: 2023-08-28

## 2023-08-27 RX ORDER — NIFEDIPINE 30 MG/1
30 TABLET, EXTENDED RELEASE ORAL DAILY
Status: DISCONTINUED | OUTPATIENT
Start: 2023-08-27 | End: 2023-08-28

## 2023-08-27 RX ORDER — POLYETHYLENE GLYCOL 3350 17 G/17G
17 POWDER, FOR SOLUTION ORAL DAILY PRN
Status: DISCONTINUED | OUTPATIENT
Start: 2023-08-27 | End: 2023-09-08 | Stop reason: HOSPADM

## 2023-08-27 RX ORDER — GAUZE BANDAGE 2" X 2"
100 BANDAGE TOPICAL DAILY
Status: DISCONTINUED | OUTPATIENT
Start: 2023-08-27 | End: 2023-09-08 | Stop reason: HOSPADM

## 2023-08-27 RX ORDER — SODIUM CHLORIDE 9 MG/ML
INJECTION, SOLUTION INTRAVENOUS CONTINUOUS
Status: DISCONTINUED | OUTPATIENT
Start: 2023-08-27 | End: 2023-09-06

## 2023-08-27 RX ADMIN — SODIUM CHLORIDE: 9 INJECTION, SOLUTION INTRAVENOUS at 18:46

## 2023-08-27 RX ADMIN — Medication 3 MG: at 20:51

## 2023-08-27 RX ADMIN — BUMETANIDE 1 MG: 0.25 INJECTION INTRAMUSCULAR; INTRAVENOUS at 13:48

## 2023-08-27 RX ADMIN — SODIUM CHLORIDE, PRESERVATIVE FREE 10 ML: 5 INJECTION INTRAVENOUS at 20:52

## 2023-08-27 RX ADMIN — ATORVASTATIN CALCIUM 40 MG: 40 TABLET, FILM COATED ORAL at 20:52

## 2023-08-27 RX ADMIN — NIFEDIPINE 30 MG: 30 TABLET, EXTENDED RELEASE ORAL at 20:51

## 2023-08-27 RX ADMIN — LORAZEPAM 1 MG: 1 TABLET ORAL at 22:00

## 2023-08-27 RX ADMIN — METOPROLOL TARTRATE 12.5 MG: 25 TABLET, FILM COATED ORAL at 20:51

## 2023-08-27 RX ADMIN — AZITHROMYCIN MONOHYDRATE 500 MG: 500 INJECTION, POWDER, LYOPHILIZED, FOR SOLUTION INTRAVENOUS at 13:07

## 2023-08-27 RX ADMIN — LORAZEPAM 1 MG: 2 INJECTION INTRAMUSCULAR; INTRAVENOUS at 18:45

## 2023-08-27 RX ADMIN — IPRATROPIUM BROMIDE AND ALBUTEROL SULFATE 3 ML: 2.5; .5 SOLUTION RESPIRATORY (INHALATION) at 12:55

## 2023-08-27 RX ADMIN — BUDESONIDE 250 MCG: 0.25 SUSPENSION RESPIRATORY (INHALATION) at 19:13

## 2023-08-27 RX ADMIN — CEFTRIAXONE 1000 MG: 1 INJECTION, POWDER, FOR SOLUTION INTRAMUSCULAR; INTRAVENOUS at 13:08

## 2023-08-27 RX ADMIN — Medication 5 MILLICURIE: at 15:14

## 2023-08-27 RX ADMIN — IPRATROPIUM BROMIDE 0.5 MG: 0.5 SOLUTION RESPIRATORY (INHALATION) at 19:13

## 2023-08-27 RX ADMIN — METHYLPREDNISOLONE SODIUM SUCCINATE 125 MG: 125 INJECTION, POWDER, FOR SOLUTION INTRAMUSCULAR; INTRAVENOUS at 13:08

## 2023-08-27 RX ADMIN — ARFORMOTEROL TARTRATE 15 MCG: 15 SOLUTION RESPIRATORY (INHALATION) at 19:13

## 2023-08-27 RX ADMIN — METHYLPREDNISOLONE SODIUM SUCCINATE 40 MG: 40 INJECTION, POWDER, FOR SOLUTION INTRAMUSCULAR; INTRAVENOUS at 18:45

## 2023-08-27 RX ADMIN — MIRTAZAPINE 3.75 MG: 7.5 TABLET ORAL at 20:51

## 2023-08-27 RX ADMIN — CLOPIDOGREL BISULFATE 75 MG: 75 TABLET ORAL at 20:51

## 2023-08-27 RX ADMIN — SODIUM CHLORIDE 1000 ML: 9 INJECTION, SOLUTION INTRAVENOUS at 20:53

## 2023-08-27 RX ADMIN — FOLIC ACID 1 MG: 1 TABLET ORAL at 20:52

## 2023-08-27 RX ADMIN — Medication 100 MG: at 20:51

## 2023-08-27 RX ADMIN — ASPIRIN 81 MG: 81 TABLET, COATED ORAL at 20:51

## 2023-08-27 RX ADMIN — ENOXAPARIN SODIUM 40 MG: 100 INJECTION SUBCUTANEOUS at 20:50

## 2023-08-27 RX ADMIN — DOXEPIN HYDROCHLORIDE 25 MG: 25 CAPSULE ORAL at 21:13

## 2023-08-27 SDOH — ECONOMIC STABILITY: FOOD INSECURITY: WITHIN THE PAST 12 MONTHS, YOU WORRIED THAT YOUR FOOD WOULD RUN OUT BEFORE YOU GOT MONEY TO BUY MORE.: NEVER TRUE

## 2023-08-27 SDOH — ECONOMIC STABILITY: INCOME INSECURITY: HOW HARD IS IT FOR YOU TO PAY FOR THE VERY BASICS LIKE FOOD, HOUSING, MEDICAL CARE, AND HEATING?: NOT VERY HARD

## 2023-08-27 SDOH — ECONOMIC STABILITY: INCOME INSECURITY: IN THE PAST 12 MONTHS, HAS THE ELECTRIC, GAS, OIL, OR WATER COMPANY THREATENED TO SHUT OFF SERVICE IN YOUR HOME?: NO

## 2023-08-27 ASSESSMENT — ENCOUNTER SYMPTOMS
CHEST TIGHTNESS: 1
COUGH: 1
VOMITING: 0
SHORTNESS OF BREATH: 1
ABDOMINAL DISTENTION: 0
WHEEZING: 0
BACK PAIN: 1
COLOR CHANGE: 0
BLOOD IN STOOL: 0
ABDOMINAL PAIN: 0
RHINORRHEA: 0
DIARRHEA: 0
CONSTIPATION: 0
NAUSEA: 0

## 2023-08-27 ASSESSMENT — PATIENT HEALTH QUESTIONNAIRE - PHQ9
2. FEELING DOWN, DEPRESSED OR HOPELESS: 0
SUM OF ALL RESPONSES TO PHQ QUESTIONS 1-9: 0
1. LITTLE INTEREST OR PLEASURE IN DOING THINGS: 0
SUM OF ALL RESPONSES TO PHQ QUESTIONS 1-9: 0
SUM OF ALL RESPONSES TO PHQ QUESTIONS 1-9: 0
SUM OF ALL RESPONSES TO PHQ9 QUESTIONS 1 & 2: 0
SUM OF ALL RESPONSES TO PHQ QUESTIONS 1-9: 0

## 2023-08-27 NOTE — ED PROVIDER NOTES
[facility]  eMERGENCY dEPARTMENT eNCOUnter      Pt Name: Harpreet Rod  MRN: [id number]  [address] [facility] 1955  Date of evaluation: 8/27/2023  Provider: Odalis Tolbert MD    [address]       Chief Complaint   Patient presents with    Shortness of Breath     X2 weeks, seen at [facility] Wednesday and sent home         HISTORY OF PRESENT ILLNESS   (Location/Symptom, Timing/Onset,Context/Setting, Quality, Duration, Modifying Factors, Severity)  Note limiting factors. Harpreet Rod is a 79 y.o. male who presents to the emergency department with shortness of breath. Patient states that he has been ill for 15 days. Patient states that shortness of breath worsened approximately 9 days ago. Patient is currently out of his rescue inhaler. Patient has been using his steroid inhaler. Patient states that initially his cough produces clear sputum but is currently dry. Patient denies fever, congestion, runny nose, sore throat, chest pain, or lower extremity swelling or pain. Patient states that he has had a diminished appetite and has not eaten for several days. Patient states that he is unable to walk across the room to the door without becoming extremely short of breath. Any exertion, including setting up, worsens shortness of breath. Patient states that he quit smoking 15 days ago. Patient has a history of COPD and has smoked tobacco since he was 7years old at 1 pack a day. HPI    NursingNotes were reviewed. REVIEW OF SYSTEMS    (2-9 systems for level 4, 10 or more for level 5)     Review of Systems   Constitutional:  Positive for activity change and appetite change. Negative for chills and fever. HENT:  Negative for congestion and rhinorrhea. Respiratory:  Positive for cough and shortness of breath. Cardiovascular:  Negative for chest pain and leg swelling. Gastrointestinal:  Negative for abdominal pain, blood in stool, diarrhea, nausea and vomiting.    Genitourinary:  Negative for

## 2023-08-27 NOTE — PROGRESS NOTES
Automatic Dose Adjustment of                Subcutaneous Anticoagulant for Prophylaxis    Lakshmi Taylor is a 79 y.o. male. Recent Labs     08/27/23  1157   CREATININE 1.8*       Estimated Creatinine Clearance: 40 mL/min (A) (based on SCr of 1.8 mg/dL (H)). Weight:  Wt Readings from Last 1 Encounters:   08/27/23 168 lb 6 oz (76.4 kg)           Pharmacy has adjusted subcutaneous anticoagulant for prophylaxis to Enoxaparin 40 mg SC daily based on the patient's weight and estimated CrCl per [address] [facility] [facility].                Electronically signed by Daniel Baumann, [address] [place] [address] on 8/27/2023 at 5:39 PM

## 2023-08-27 NOTE — H&P
supple. No rigidity or tenderness. Right lower leg: No edema. Left lower leg: No edema. Comments: Left foot healing wound, no drainage neurovascular intact    Skin:     General: Skin is warm and dry. Capillary Refill: Capillary refill takes less than 2 seconds. Coloration: Skin is pale. Neurological:      General: No focal deficit present. Mental Status: He is alert and oriented to person, place, and time. Cranial Nerves: No cranial nerve deficit. Motor: Weakness present. Psychiatric:         Mood and Affect: Mood normal.         Behavior: Behavior normal.        Diagnostic Data:  CBC:  Recent Labs     08/27/23  1157   WBC 8.2   HGB 9.7*   HCT 29.7*        BMP:  Recent Labs     08/27/23  1157 08/27/23  1308   *  --    K 3.9 3.7   CL 96*  --    CO2 13*  --    BUN 61*  --    CREATININE 1.8*  --    CALCIUM 9.4  --      Recent Labs     08/27/23  1157   AST 23   ALT 9   BILITOT 0.9   ALKPHOS 66     Coag Panel: No results for input(s): INR, PROTIME, APTT in the last 72 hours. Cardiac Enzymes:   Recent Labs     08/27/23  1157   TROPONINI 0.01     ABGs:  Lab Results   Component Value Date/Time    PHART 7.470 08/27/2023 01:08 PM    PO2ART 77.0 08/27/2023 01:08 PM    KDP7OTK 15.0 08/27/2023 01:08 PM     Urinalysis:  Lab Results   Component Value Date/Time    NITRU Negative 06/14/2022 02:10 PM    WBCUA 1 06/14/2022 02:10 PM    BACTERIA NEGATIVE 06/14/2022 02:10 PM    RBCUA 2 06/14/2022 02:10 PM    BLOODU Negative 06/14/2022 02:10 PM    SPECGRAV 1.015 06/14/2022 02:10 PM    GLUCOSEU Negative 06/14/2022 02:10 PM     A1C: No results for input(s): LABA1C in the last 72 hours. ABG:  Recent Labs     08/27/23  1308   PHART 7.470*   CGV4SGT 15.0*   PO2ART 77.0*   RBE2IBI 10.9*   BEART -10.6*   HGBAE 10.3*   H1GZBNXD 94.9   CARBOXHGBART 2.0       No results found.     Assessment/Plan:  Principal Problem:  Sepsis unknown etiology   -CXR   -urinalysis   -XR left foot   -follow pan cx  -procalcitonin   -broad-spectrum iv abx  -ivf  -trend lactate   Active Problems:    COPD with acute exacerbation / Respiratory alkalosis    -D-Dimer   -VQ scan    -ECHO   - IV Abx Azithromycin, Rocephin               - Bronchodilators               - IV steroids              - Supplemental oxygen as needed               - Incentive spirometry               - Encourage deep breathing and cough     Alcohol abuse              - ETOH level              - Monitor for withdrawal               - CIWA protocol in place   - Lorazepam PRN per [facility] protocol   - Seizure Precautions               - Daily Thiamine/folic acid/ multivitamin               - Counseled on alcohol abuse               - Social work consultation for rehab              - Denies SI/HI              - Telemetry    CKD (chronic kidney disease), stage III              - Monitor I's and O's closely              - Monitor labs closely              - Avoid hypotension              - Avoid nephrotoxic agents      DVT prophylactic: Lovenox     Signed:  KRYSTIAN Bronson CNP, 8/27/2023 2:21 PM      EMR Dragon/Transcription disclaimer:   Much of this encounter note is an electronic transcription/translation of spoken language to printed text.  The electronic translation of spoken language may permit erroneous, or at times, nonsensical words or phrases to be inadvertently transcribed; although attempts have made to review the note for such errors, some may still exist.

## 2023-08-27 NOTE — PROGRESS NOTES
Pt transferred to room 717 with tele monitor in place. During assessment, pt stated he has not ate anything in 7 days and does not want anything to eat. Pt also stated that he stopped taking all of his medications after his brother and sister passed approx 1 year ago. He stated \"I just gave up because I didn't see the point, but now I want to live. \"  Educated pt on importance of a healthy diet and strongly encouraged at least a protein shake. Call light in reach. Pt instructed not to get out of bed without help d/t unsteady gait. Bed alarm on.

## 2023-08-28 ENCOUNTER — APPOINTMENT (OUTPATIENT)
Dept: CT IMAGING | Age: 68
End: 2023-08-28
Payer: MEDICARE

## 2023-08-28 LAB
ALBUMIN SERPL-MCNC: 3.1 G/DL (ref 3.5–5.2)
ALBUMIN SERPL-MCNC: 3.6 G/DL (ref 3.5–5.2)
ALP SERPL-CCNC: 59 U/L (ref 40–130)
ALT SERPL-CCNC: 9 U/L (ref 5–41)
AMPHET UR QL SCN: NEGATIVE
ANION GAP SERPL CALCULATED.3IONS-SCNC: 22 MMOL/L (ref 7–19)
ANION GAP SERPL CALCULATED.3IONS-SCNC: 23 MMOL/L (ref 7–19)
AST SERPL-CCNC: 19 U/L (ref 5–40)
BACTERIA URNS QL MICRO: NEGATIVE /HPF
BARBITURATES UR QL SCN: NEGATIVE
BASOPHILS # BLD: 0 K/UL (ref 0–0.2)
BASOPHILS NFR BLD: 0 % (ref 0–1)
BENZODIAZ UR QL SCN: POSITIVE
BILIRUB SERPL-MCNC: 0.6 MG/DL (ref 0.2–1.2)
BILIRUB UR QL STRIP: NEGATIVE
BUN SERPL-MCNC: 60 MG/DL (ref 8–23)
BUN SERPL-MCNC: 64 MG/DL (ref 8–23)
BUPRENORPHINE URINE: NEGATIVE
CALCIUM SERPL-MCNC: 8.6 MG/DL (ref 8.8–10.2)
CALCIUM SERPL-MCNC: 8.7 MG/DL (ref 8.8–10.2)
CANNABINOIDS UR QL SCN: NEGATIVE
CHLORIDE SERPL-SCNC: 107 MMOL/L (ref 98–111)
CHLORIDE SERPL-SCNC: 99 MMOL/L (ref 98–111)
CLARITY UR: CLEAR
CO2 SERPL-SCNC: 10 MMOL/L (ref 22–29)
CO2 SERPL-SCNC: 13 MMOL/L (ref 22–29)
COCAINE UR QL SCN: NEGATIVE
COLOR UR: YELLOW
CREAT SERPL-MCNC: 1.4 MG/DL (ref 0.5–1.2)
CREAT SERPL-MCNC: 1.7 MG/DL (ref 0.5–1.2)
CRYSTALS URNS MICRO: ABNORMAL /HPF
DRUG SCREEN COMMENT UR-IMP: ABNORMAL
EKG P AXIS: 67 DEGREES
EKG P AXIS: 72 DEGREES
EKG P-R INTERVAL: 168 MS
EKG P-R INTERVAL: 170 MS
EKG Q-T INTERVAL: 346 MS
EKG Q-T INTERVAL: 426 MS
EKG QRS DURATION: 88 MS
EKG QRS DURATION: 94 MS
EKG QTC CALCULATION (BAZETT): 430 MS
EKG QTC CALCULATION (BAZETT): 462 MS
EKG T AXIS: -5 DEGREES
EKG T AXIS: -63 DEGREES
EOSINOPHIL # BLD: 0 K/UL (ref 0–0.6)
EOSINOPHIL NFR BLD: 0.2 % (ref 0–5)
EPI CELLS #/AREA URNS AUTO: 0 /HPF (ref 0–5)
ERYTHROCYTE [DISTWIDTH] IN BLOOD BY AUTOMATED COUNT: 14.8 % (ref 11.5–14.5)
GLUCOSE BLD-MCNC: 246 MG/DL (ref 70–99)
GLUCOSE BLD-MCNC: 266 MG/DL (ref 70–99)
GLUCOSE BLD-MCNC: 273 MG/DL (ref 70–99)
GLUCOSE BLD-MCNC: 335 MG/DL (ref 70–99)
GLUCOSE SERPL-MCNC: 165 MG/DL (ref 74–109)
GLUCOSE SERPL-MCNC: 279 MG/DL (ref 74–109)
GLUCOSE UR STRIP.AUTO-MCNC: NEGATIVE MG/DL
HCT VFR BLD AUTO: 29.7 % (ref 42–52)
HGB BLD-MCNC: 9.8 G/DL (ref 14–18)
HGB UR STRIP.AUTO-MCNC: ABNORMAL MG/L
HYALINE CASTS #/AREA URNS AUTO: 0 /HPF (ref 0–8)
IMM GRANULOCYTES # BLD: 0.1 K/UL
KETONES UR STRIP.AUTO-MCNC: NEGATIVE MG/DL
LEUKOCYTE ESTERASE UR QL STRIP.AUTO: NEGATIVE
LIPASE SERPL-CCNC: 92 U/L (ref 13–60)
LV EF: 70 %
LVEF MODALITY: NORMAL
LYMPHOCYTES # BLD: 0.4 K/UL (ref 1.1–4.5)
LYMPHOCYTES NFR BLD: 9.3 % (ref 20–40)
MCH RBC QN AUTO: 39.2 PG (ref 27–31)
MCHC RBC AUTO-ENTMCNC: 33 G/DL (ref 33–37)
MCV RBC AUTO: 118.8 FL (ref 80–94)
METHADONE UR QL SCN: NEGATIVE
METHAMPHETAMINE, URINE: NEGATIVE
MONOCYTES # BLD: 0 K/UL (ref 0–0.9)
MONOCYTES NFR BLD: 0.5 % (ref 0–10)
NEUTROPHILS # BLD: 3.8 K/UL (ref 1.5–7.5)
NEUTS SEG NFR BLD: 87.5 % (ref 50–65)
NITRITE UR QL STRIP.AUTO: NEGATIVE
OPIATES UR QL SCN: NEGATIVE
OXYCODONE UR QL SCN: NEGATIVE
PCP UR QL SCN: NEGATIVE
PERFORMED ON: ABNORMAL
PH UR STRIP.AUTO: 5.5 [PH] (ref 5–8)
PHOSPHATE SERPL-MCNC: 2.2 MG/DL (ref 2.5–4.5)
PLATELET # BLD AUTO: 141 K/UL (ref 130–400)
PMV BLD AUTO: 11.6 FL (ref 9.4–12.4)
POTASSIUM SERPL-SCNC: 3.5 MMOL/L (ref 3.5–5)
POTASSIUM SERPL-SCNC: 4.2 MMOL/L (ref 3.5–5)
PROCALCITONIN: 21.38 NG/ML (ref 0–0.09)
PROPOXYPH UR QL SCN: NEGATIVE
PROT SERPL-MCNC: 6 G/DL (ref 6.6–8.7)
PROT UR STRIP.AUTO-MCNC: NEGATIVE MG/DL
RBC # BLD AUTO: 2.5 M/UL (ref 4.7–6.1)
RBC #/AREA URNS AUTO: 2 /HPF (ref 0–4)
SODIUM SERPL-SCNC: 134 MMOL/L (ref 136–145)
SODIUM SERPL-SCNC: 140 MMOL/L (ref 136–145)
SP GR UR STRIP.AUTO: 1.01 (ref 1–1.03)
TRICYCLIC, URINE: NEGATIVE
TROPONIN T SERPL-MCNC: <0.01 NG/ML (ref 0–0.03)
TROPONIN T SERPL-MCNC: <0.01 NG/ML (ref 0–0.03)
UROBILINOGEN UR STRIP.AUTO-MCNC: 0.2 E.U./DL
WBC # BLD AUTO: 4.4 K/UL (ref 4.8–10.8)
WBC #/AREA URNS AUTO: 0 /HPF (ref 0–5)

## 2023-08-28 PROCEDURE — 36415 COLL VENOUS BLD VENIPUNCTURE: CPT

## 2023-08-28 PROCEDURE — 94640 AIRWAY INHALATION TREATMENT: CPT

## 2023-08-28 PROCEDURE — 6370000000 HC RX 637 (ALT 250 FOR IP): Performed by: INTERNAL MEDICINE

## 2023-08-28 PROCEDURE — 84484 ASSAY OF TROPONIN QUANT: CPT

## 2023-08-28 PROCEDURE — 2580000003 HC RX 258: Performed by: INTERNAL MEDICINE

## 2023-08-28 PROCEDURE — 82962 GLUCOSE BLOOD TEST: CPT

## 2023-08-28 PROCEDURE — 83690 ASSAY OF LIPASE: CPT

## 2023-08-28 PROCEDURE — 93005 ELECTROCARDIOGRAM TRACING: CPT

## 2023-08-28 PROCEDURE — 6370000000 HC RX 637 (ALT 250 FOR IP)

## 2023-08-28 PROCEDURE — 80306 DRUG TEST PRSMV INSTRMNT: CPT

## 2023-08-28 PROCEDURE — 6360000004 HC RX CONTRAST MEDICATION: Performed by: INTERNAL MEDICINE

## 2023-08-28 PROCEDURE — 93010 ELECTROCARDIOGRAM REPORT: CPT | Performed by: INTERNAL MEDICINE

## 2023-08-28 PROCEDURE — 2500000003 HC RX 250 WO HCPCS: Performed by: INTERNAL MEDICINE

## 2023-08-28 PROCEDURE — 51798 US URINE CAPACITY MEASURE: CPT

## 2023-08-28 PROCEDURE — 85025 COMPLETE CBC W/AUTO DIFF WBC: CPT

## 2023-08-28 PROCEDURE — 94150 VITAL CAPACITY TEST: CPT

## 2023-08-28 PROCEDURE — 84145 PROCALCITONIN (PCT): CPT

## 2023-08-28 PROCEDURE — 2580000003 HC RX 258

## 2023-08-28 PROCEDURE — 74176 CT ABD & PELVIS W/O CONTRAST: CPT

## 2023-08-28 PROCEDURE — 2700000000 HC OXYGEN THERAPY PER DAY

## 2023-08-28 PROCEDURE — 6360000002 HC RX W HCPCS

## 2023-08-28 PROCEDURE — 94760 N-INVAS EAR/PLS OXIMETRY 1: CPT

## 2023-08-28 PROCEDURE — 81001 URINALYSIS AUTO W/SCOPE: CPT

## 2023-08-28 PROCEDURE — 80053 COMPREHEN METABOLIC PANEL: CPT

## 2023-08-28 PROCEDURE — 2140000000 HC CCU INTERMEDIATE R&B

## 2023-08-28 PROCEDURE — C8929 TTE W OR WO FOL WCON,DOPPLER: HCPCS

## 2023-08-28 RX ORDER — ERGOCALCIFEROL 1.25 MG/1
50000 CAPSULE ORAL WEEKLY
Status: DISCONTINUED | OUTPATIENT
Start: 2023-08-28 | End: 2023-09-08 | Stop reason: HOSPADM

## 2023-08-28 RX ORDER — CHLORDIAZEPOXIDE HYDROCHLORIDE 25 MG/1
25 CAPSULE, GELATIN COATED ORAL 4 TIMES DAILY
Status: DISCONTINUED | OUTPATIENT
Start: 2023-08-28 | End: 2023-09-02

## 2023-08-28 RX ORDER — INSULIN LISPRO 100 [IU]/ML
0-8 INJECTION, SOLUTION INTRAVENOUS; SUBCUTANEOUS EVERY 4 HOURS
Status: DISCONTINUED | OUTPATIENT
Start: 2023-08-28 | End: 2023-08-29

## 2023-08-28 RX ORDER — INSULIN GLARGINE 100 [IU]/ML
15 INJECTION, SOLUTION SUBCUTANEOUS 2 TIMES DAILY
Status: DISCONTINUED | OUTPATIENT
Start: 2023-08-28 | End: 2023-08-29

## 2023-08-28 RX ORDER — 0.9 % SODIUM CHLORIDE 0.9 %
1000 INTRAVENOUS SOLUTION INTRAVENOUS ONCE
Status: COMPLETED | OUTPATIENT
Start: 2023-08-28 | End: 2023-08-28

## 2023-08-28 RX ORDER — DEXTROSE MONOHYDRATE 100 MG/ML
INJECTION, SOLUTION INTRAVENOUS CONTINUOUS PRN
Status: DISCONTINUED | OUTPATIENT
Start: 2023-08-28 | End: 2023-09-08 | Stop reason: HOSPADM

## 2023-08-28 RX ORDER — IPRATROPIUM BROMIDE AND ALBUTEROL SULFATE 2.5; .5 MG/3ML; MG/3ML
1 SOLUTION RESPIRATORY (INHALATION)
Status: DISCONTINUED | OUTPATIENT
Start: 2023-08-28 | End: 2023-09-08 | Stop reason: HOSPADM

## 2023-08-28 RX ADMIN — INSULIN GLARGINE 15 UNITS: 100 INJECTION, SOLUTION SUBCUTANEOUS at 18:39

## 2023-08-28 RX ADMIN — PANTOPRAZOLE SODIUM 40 MG: 40 TABLET, DELAYED RELEASE ORAL at 06:13

## 2023-08-28 RX ADMIN — CEFTRIAXONE 1000 MG: 1 INJECTION, POWDER, FOR SOLUTION INTRAMUSCULAR; INTRAVENOUS at 12:51

## 2023-08-28 RX ADMIN — CHLORDIAZEPOXIDE HYDROCHLORIDE 25 MG: 25 CAPSULE ORAL at 18:18

## 2023-08-28 RX ADMIN — INSULIN LISPRO 6 UNITS: 100 INJECTION, SOLUTION INTRAVENOUS; SUBCUTANEOUS at 17:10

## 2023-08-28 RX ADMIN — ASPIRIN 81 MG: 81 TABLET, COATED ORAL at 09:17

## 2023-08-28 RX ADMIN — IPRATROPIUM BROMIDE AND ALBUTEROL SULFATE 1 DOSE: 2.5; .5 SOLUTION RESPIRATORY (INHALATION) at 14:08

## 2023-08-28 RX ADMIN — ATORVASTATIN CALCIUM 40 MG: 40 TABLET, FILM COATED ORAL at 09:17

## 2023-08-28 RX ADMIN — METHYLPREDNISOLONE SODIUM SUCCINATE 40 MG: 40 INJECTION, POWDER, FOR SOLUTION INTRAMUSCULAR; INTRAVENOUS at 01:09

## 2023-08-28 RX ADMIN — SODIUM CHLORIDE 1000 ML: 9 INJECTION, SOLUTION INTRAVENOUS at 09:28

## 2023-08-28 RX ADMIN — CLOPIDOGREL BISULFATE 75 MG: 75 TABLET ORAL at 09:17

## 2023-08-28 RX ADMIN — ERGOCALCIFEROL 50000 UNITS: 1.25 CAPSULE ORAL at 09:32

## 2023-08-28 RX ADMIN — DOXEPIN HYDROCHLORIDE 25 MG: 25 CAPSULE ORAL at 19:33

## 2023-08-28 RX ADMIN — ENOXAPARIN SODIUM 40 MG: 100 INJECTION SUBCUTANEOUS at 19:33

## 2023-08-28 RX ADMIN — ARFORMOTEROL TARTRATE 15 MCG: 15 SOLUTION RESPIRATORY (INHALATION) at 18:27

## 2023-08-28 RX ADMIN — CHLORDIAZEPOXIDE HYDROCHLORIDE 25 MG: 25 CAPSULE ORAL at 19:32

## 2023-08-28 RX ADMIN — IPRATROPIUM BROMIDE AND ALBUTEROL SULFATE 1 DOSE: 2.5; .5 SOLUTION RESPIRATORY (INHALATION) at 10:17

## 2023-08-28 RX ADMIN — BUDESONIDE 250 MCG: 0.25 SUSPENSION RESPIRATORY (INHALATION) at 18:27

## 2023-08-28 RX ADMIN — ARFORMOTEROL TARTRATE 15 MCG: 15 SOLUTION RESPIRATORY (INHALATION) at 06:29

## 2023-08-28 RX ADMIN — AZITHROMYCIN MONOHYDRATE 500 MG: 500 INJECTION, POWDER, LYOPHILIZED, FOR SOLUTION INTRAVENOUS at 12:51

## 2023-08-28 RX ADMIN — INSULIN LISPRO 2 UNITS: 100 INJECTION, SOLUTION INTRAVENOUS; SUBCUTANEOUS at 11:16

## 2023-08-28 RX ADMIN — FOLIC ACID 1 MG: 1 TABLET ORAL at 09:17

## 2023-08-28 RX ADMIN — IPRATROPIUM BROMIDE 0.5 MG: 0.5 SOLUTION RESPIRATORY (INHALATION) at 06:29

## 2023-08-28 RX ADMIN — INSULIN LISPRO 4 UNITS: 100 INJECTION, SOLUTION INTRAVENOUS; SUBCUTANEOUS at 21:37

## 2023-08-28 RX ADMIN — PERFLUTREN 1.5 ML: 6.52 INJECTION, SUSPENSION INTRAVENOUS at 08:49

## 2023-08-28 RX ADMIN — MIRTAZAPINE 3.75 MG: 7.5 TABLET ORAL at 19:32

## 2023-08-28 RX ADMIN — METOPROLOL TARTRATE 6.25 MG: 25 TABLET, FILM COATED ORAL at 19:32

## 2023-08-28 RX ADMIN — Medication 100 MG: at 09:17

## 2023-08-28 RX ADMIN — ACETAMINOPHEN 650 MG: 325 TABLET ORAL at 09:22

## 2023-08-28 RX ADMIN — SODIUM BICARBONATE 50 MEQ: 84 INJECTION INTRAVENOUS at 17:11

## 2023-08-28 RX ADMIN — METHYLPREDNISOLONE SODIUM SUCCINATE 40 MG: 40 INJECTION, POWDER, FOR SOLUTION INTRAMUSCULAR; INTRAVENOUS at 06:13

## 2023-08-28 RX ADMIN — METHYLPREDNISOLONE SODIUM SUCCINATE 40 MG: 40 INJECTION, POWDER, FOR SOLUTION INTRAMUSCULAR; INTRAVENOUS at 12:46

## 2023-08-28 RX ADMIN — Medication 3 MG: at 19:32

## 2023-08-28 RX ADMIN — BUDESONIDE 250 MCG: 0.25 SUSPENSION RESPIRATORY (INHALATION) at 06:29

## 2023-08-28 RX ADMIN — METHYLPREDNISOLONE SODIUM SUCCINATE 40 MG: 40 INJECTION, POWDER, FOR SOLUTION INTRAMUSCULAR; INTRAVENOUS at 19:33

## 2023-08-28 RX ADMIN — INSULIN LISPRO 4 UNITS: 100 INJECTION, SOLUTION INTRAVENOUS; SUBCUTANEOUS at 09:24

## 2023-08-28 RX ADMIN — IPRATROPIUM BROMIDE AND ALBUTEROL SULFATE 1 DOSE: 2.5; .5 SOLUTION RESPIRATORY (INHALATION) at 18:27

## 2023-08-28 RX ADMIN — SODIUM BICARBONATE 50 MEQ: 84 INJECTION INTRAVENOUS at 11:17

## 2023-08-28 ASSESSMENT — PAIN DESCRIPTION - LOCATION: LOCATION: BACK

## 2023-08-28 ASSESSMENT — PAIN SCALES - GENERAL: PAINLEVEL_OUTOF10: 6

## 2023-08-28 NOTE — PROGRESS NOTES
Hospitalist Progress Note        PCP: No primary care provider on file. Date of Admission: 8/27/2023    Length of Stay: 1    Chief Complaint: from home, no PO intake, severe dyspnea, ongoing alcohol abuse. Tobacco abuse. Stopped taking all of his meds about a year ago. Subjective:     Ongoing sensation of severe dyspnea. Oxygenation stable.      Urine output minimal.       Medications:  Reviewed    Infusion Medications    dextrose      sodium chloride      sodium chloride 150 mL/hr at 08/28/23 1245     Scheduled Medications    vitamin D  50,000 Units Oral Weekly    ipratropium 0.5 mg-albuterol 2.5 mg  1 Dose Inhalation Q4H WA RT    insulin lispro  0-8 Units SubCUTAneous Q4H    sodium bicarbonate  50 mEq IntraVENous Q4H    metoprolol tartrate  6.25 mg Oral BID    aspirin  81 mg Oral Daily    atorvastatin  40 mg Oral Daily    clopidogrel  75 mg Oral Daily    doxepin  25 mg Oral Nightly    folic acid  1 mg Oral Daily    melatonin  3 mg Oral Nightly    mirtazapine  3.75 mg Oral Nightly    pantoprazole  40 mg Oral QAM AC    sodium chloride flush  5-40 mL IntraVENous 2 times per day    arformoterol tartrate  15 mcg Nebulization BID RT    budesonide  0.25 mg Nebulization BID RT    methylPREDNISolone  40 mg IntraVENous Q6H    Followed by    Jennifer Agudelo ON 8/30/2023] predniSONE  40 mg Oral Daily    thiamine  100 mg Oral Daily    nicotine  1 patch TransDERmal Daily    cefTRIAXone (ROCEPHIN) IV  1,000 mg IntraVENous Q24H    azithromycin  500 mg IntraVENous Q24H    enoxaparin  40 mg SubCUTAneous Daily     PRN Meds: glucose, dextrose bolus **OR** dextrose bolus, glucagon (rDNA), dextrose, perflutren lipid microspheres, sodium chloride flush, sodium chloride, ondansetron **OR** ondansetron, polyethylene glycol, acetaminophen **OR** acetaminophen      Intake/Output Summary (Last 24 hours) at 8/28/2023 1310  Last data filed at 8/28/2023 0348  Gross per 24 hour   Intake 10 ml   Output 425 ml   Net -415 ml

## 2023-08-28 NOTE — CARE COORDINATION
Case Management Assessment  Initial Evaluation    Date/Time of Evaluation: 8/28/2023 11:07 AM  Assessment Completed by: SHANDRA Yang    If patient is discharged prior to next notation, then this note serves as note for discharge by case management. Patient Name: Nicole Dubon                   YOB: 1955  Diagnosis: COPD exacerbation ([address]) [J44.1]  COPD with acute exacerbation ([address]) [J44.1]                   Date / Time: 8/27/2023 11:48 AM    Patient Admission Status: Inpatient   Readmission Risk (Low < 19, Mod (19-27), High > 27): Readmission Risk Score: 20    Current PCP: No primary care provider on file. PCP verified by CM? (P) Yes    Chart Reviewed: Yes      History Provided by: (P) Patient  Patient Orientation: (P) Alert and Oriented, Person, Place, Situation, Self    Patient Cognition: (P) Alert    Hospitalization in the last 30 days (Readmission):  No    If yes, Readmission Assessment in CM Navigator will be completed.     Advance Directives:      Code Status: Full Code   Patient's Primary Decision Maker is: (P) Legal Next of Kin      Discharge Planning:    Patient lives with: (P) Spouse/Significant Other Type of Home: (P) House  Primary Care Giver: (P) Self  Patient Support Systems include: (P) Children Mareilla Khan)   Current Financial resources: (P) Medicaid, Medicare  Current community resources: (P) None  Current services prior to admission: (P) None            Current DME:              Type of Home Care services:  (P) PT, OT    ADLS  Prior functional level: (P) Assistance with the following:  Current functional level: (P) Assistance with the following:    PT AM-PAC:   /24  OT AM-PAC:   /24    Family can provide assistance at DC: (P) No  Would you like Case Management to discuss the discharge plan with any other family members/significant others, and if so, who? (P) No  Plans to Return to Present Housing: (P) Unknown at present  Other Identified Issues/Barriers to RETURNING to

## 2023-08-28 NOTE — PROGRESS NOTES
Nutrition Assessment     Type and Reason for Visit: Initial, Positive Nutrition Screen    Nutrition Recommendations/Plan:   Modify ONS to Glucerna TID. Malnutrition Assessment:  Malnutrition Status: At risk for malnutrition (Comment)    Nutrition Assessment:  +NS for pt-reported wt loss and decreased appetite. Pt presents at risk for nutritional compromise r/t reported poor intake x7 days, L foot wound, and ETOH abuse. Per wt records, pt has not had significant wt loss over the past year. No PO intake yet documented this admission. Noted mild hyperglycemia with XowE0i=9.4%. Hyperglycemia possibly r/t Prednisone and Solu-Medrol. Will modify ONS to Glucerna TID to decrease overall carbohydrate provision. Will follow for oral intake and modify intervention as needed. Estimated Daily Nutrient Needs:  Energy (kcal):  9703-0830 Weight Used for Energy Requirements: Current     Protein (g):   Weight Used for Protein Requirements: Current        Fluid (ml/day):  3918-3084 Method Used for Fluid Requirements: 1 ml/kcal    Nutrition Related Findings:   L foot wound Wound Type: Open Wounds (L foot)    Current Nutrition Therapies:    ADULT DIET; Regular; Low Sodium (2 gm);  No Caffeine  ADULT ORAL NUTRITION SUPPLEMENT; Breakfast, Lunch, Dinner; Standard High Calorie/High Protein Oral Supplement    Anthropometric Measures:  Height: 5' 9\" (175.3 cm)  Current Body Wt: 166 lb 9.6 oz (75.6 kg)   BMI: 24.6    Nutrition Diagnosis:   Inadequate energy intake related to impaired respiratory function, increase demand for energy/nutrients as evidenced by poor intake prior to admission    Nutrition Interventions:   Food and/or Nutrient Delivery: Continue Current Diet, Modify Oral Nutrition Supplement  Nutrition Education/Counseling: No recommendation at this time  Coordination of Nutrition Care: Continue to monitor while inpatient       Goals:     Goals: PO intake 50% or greater       Nutrition Monitoring and Evaluation:

## 2023-08-28 NOTE — PLAN OF CARE
Problem: Nutrition Deficit:  Goal: Optimize nutritional status  Outcome: Progressing  Flowsheets (Taken 8/28/2023 6008)  Nutrient intake appropriate for improving, restoring, or maintaining nutritional needs:   Assess nutritional status and recommend course of action   Monitor oral intake, labs, and treatment plans   Recommend appropriate diets, oral nutritional supplements, and vitamin/mineral supplements

## 2023-08-28 NOTE — CARE COORDINATION
Trang spoke to Gabe from [facility] [phone] Fax [phone]. Gabe reports they would like Pt to be 5 days sober from when he last took his drink on 8/26. If Pt accepts bed offer at [facility] the SNF could accepted him on 9/1/23. Electronically signed by SHANDRA Caal on 8/28/2023 at 3:27 PM    Sw spoke to Pt about the bed offer from [facility]. Pt is agreeable to d/c to this SNF on 9/1. TRANG called Jenae back with [facility], she had left for the day. Will follow up tomorrow.    Electronically signed by SHANDRA Caal on 8/28/2023 at 4:31 PM

## 2023-08-29 ENCOUNTER — APPOINTMENT (OUTPATIENT)
Dept: ULTRASOUND IMAGING | Age: 68
End: 2023-08-29
Payer: MEDICARE

## 2023-08-29 LAB
25(OH)D3 SERPL-MCNC: 17.4 NG/ML
ALBUMIN SERPL-MCNC: 3.5 G/DL (ref 3.5–5.2)
ALBUMIN SERPL-MCNC: 3.6 G/DL (ref 3.5–5.2)
ANION GAP SERPL CALCULATED.3IONS-SCNC: 16 MMOL/L (ref 7–19)
ANION GAP SERPL CALCULATED.3IONS-SCNC: 18 MMOL/L (ref 7–19)
BASOPHILS # BLD: 0 K/UL (ref 0–0.2)
BASOPHILS NFR BLD: 0 % (ref 0–1)
BUN SERPL-MCNC: 51 MG/DL (ref 8–23)
BUN SERPL-MCNC: 59 MG/DL (ref 8–23)
CALCIUM SERPL-MCNC: 8.4 MG/DL (ref 8.8–10.2)
CALCIUM SERPL-MCNC: 8.6 MG/DL (ref 8.8–10.2)
CHLORIDE SERPL-SCNC: 106 MMOL/L (ref 98–111)
CHLORIDE SERPL-SCNC: 107 MMOL/L (ref 98–111)
CO2 SERPL-SCNC: 17 MMOL/L (ref 22–29)
CO2 SERPL-SCNC: 18 MMOL/L (ref 22–29)
CREAT SERPL-MCNC: 1.6 MG/DL (ref 0.5–1.2)
CREAT SERPL-MCNC: 1.9 MG/DL (ref 0.5–1.2)
EKG P AXIS: 58 DEGREES
EKG P-R INTERVAL: 170 MS
EKG Q-T INTERVAL: 418 MS
EKG QRS DURATION: 86 MS
EKG QTC CALCULATION (BAZETT): 460 MS
EKG T AXIS: -95 DEGREES
EOSINOPHIL # BLD: 0 K/UL (ref 0–0.6)
EOSINOPHIL NFR BLD: 0 % (ref 0–5)
ERYTHROCYTE [DISTWIDTH] IN BLOOD BY AUTOMATED COUNT: 15 % (ref 11.5–14.5)
GLUCOSE BLD-MCNC: 212 MG/DL (ref 70–99)
GLUCOSE BLD-MCNC: 219 MG/DL (ref 70–99)
GLUCOSE BLD-MCNC: 262 MG/DL (ref 70–99)
GLUCOSE BLD-MCNC: 268 MG/DL (ref 70–99)
GLUCOSE BLD-MCNC: 271 MG/DL (ref 70–99)
GLUCOSE BLD-MCNC: 297 MG/DL (ref 70–99)
GLUCOSE SERPL-MCNC: 195 MG/DL (ref 74–109)
GLUCOSE SERPL-MCNC: 226 MG/DL (ref 74–109)
HCT VFR BLD AUTO: 24.9 % (ref 42–52)
HGB BLD-MCNC: 8.2 G/DL (ref 14–18)
IMM GRANULOCYTES # BLD: 0.1 K/UL
LYMPHOCYTES # BLD: 0.3 K/UL (ref 1.1–4.5)
LYMPHOCYTES NFR BLD: 6.1 % (ref 20–40)
MCH RBC QN AUTO: 39 PG (ref 27–31)
MCHC RBC AUTO-ENTMCNC: 32.9 G/DL (ref 33–37)
MCV RBC AUTO: 118.6 FL (ref 80–94)
MONOCYTES # BLD: 0.1 K/UL (ref 0–0.9)
MONOCYTES NFR BLD: 2.5 % (ref 0–10)
NEUTROPHILS # BLD: 3.7 K/UL (ref 1.5–7.5)
NEUTS SEG NFR BLD: 89.7 % (ref 50–65)
PERFORMED ON: ABNORMAL
PHOSPHATE SERPL-MCNC: 1.7 MG/DL (ref 2.5–4.5)
PHOSPHATE SERPL-MCNC: 2.5 MG/DL (ref 2.5–4.5)
PLATELET # BLD AUTO: 123 K/UL (ref 130–400)
PMV BLD AUTO: 11.3 FL (ref 9.4–12.4)
POTASSIUM SERPL-SCNC: 3.4 MMOL/L (ref 3.5–5)
POTASSIUM SERPL-SCNC: 3.5 MMOL/L (ref 3.5–5)
PROCALCITONIN: 8.68 NG/ML (ref 0–0.09)
PTH-INTACT SERPL-MCNC: 172.3 PG/ML (ref 15–65)
RBC # BLD AUTO: 2.1 M/UL (ref 4.7–6.1)
SODIUM SERPL-SCNC: 139 MMOL/L (ref 136–145)
SODIUM SERPL-SCNC: 143 MMOL/L (ref 136–145)
WBC # BLD AUTO: 4.1 K/UL (ref 4.8–10.8)

## 2023-08-29 PROCEDURE — 6360000002 HC RX W HCPCS

## 2023-08-29 PROCEDURE — 76775 US EXAM ABDO BACK WALL LIM: CPT

## 2023-08-29 PROCEDURE — 6370000000 HC RX 637 (ALT 250 FOR IP): Performed by: INTERNAL MEDICINE

## 2023-08-29 PROCEDURE — 93010 ELECTROCARDIOGRAM REPORT: CPT | Performed by: INTERNAL MEDICINE

## 2023-08-29 PROCEDURE — 36415 COLL VENOUS BLD VENIPUNCTURE: CPT

## 2023-08-29 PROCEDURE — 85025 COMPLETE CBC W/AUTO DIFF WBC: CPT

## 2023-08-29 PROCEDURE — 94760 N-INVAS EAR/PLS OXIMETRY 1: CPT

## 2023-08-29 PROCEDURE — 2580000003 HC RX 258: Performed by: INTERNAL MEDICINE

## 2023-08-29 PROCEDURE — 94640 AIRWAY INHALATION TREATMENT: CPT

## 2023-08-29 PROCEDURE — 93926 LOWER EXTREMITY STUDY: CPT

## 2023-08-29 PROCEDURE — 2580000003 HC RX 258

## 2023-08-29 PROCEDURE — 6370000000 HC RX 637 (ALT 250 FOR IP)

## 2023-08-29 PROCEDURE — 84145 PROCALCITONIN (PCT): CPT

## 2023-08-29 PROCEDURE — 6360000002 HC RX W HCPCS: Performed by: INTERNAL MEDICINE

## 2023-08-29 PROCEDURE — 82728 ASSAY OF FERRITIN: CPT

## 2023-08-29 PROCEDURE — 2700000000 HC OXYGEN THERAPY PER DAY

## 2023-08-29 PROCEDURE — 82306 VITAMIN D 25 HYDROXY: CPT

## 2023-08-29 PROCEDURE — 80069 RENAL FUNCTION PANEL: CPT

## 2023-08-29 PROCEDURE — 82962 GLUCOSE BLOOD TEST: CPT

## 2023-08-29 PROCEDURE — 93005 ELECTROCARDIOGRAM TRACING: CPT

## 2023-08-29 PROCEDURE — 93923 UPR/LXTR ART STDY 3+ LVLS: CPT

## 2023-08-29 PROCEDURE — 2140000000 HC CCU INTERMEDIATE R&B

## 2023-08-29 PROCEDURE — 83970 ASSAY OF PARATHORMONE: CPT

## 2023-08-29 RX ORDER — INSULIN LISPRO 100 [IU]/ML
0-4 INJECTION, SOLUTION INTRAVENOUS; SUBCUTANEOUS NIGHTLY
Status: DISCONTINUED | OUTPATIENT
Start: 2023-08-29 | End: 2023-09-08 | Stop reason: HOSPADM

## 2023-08-29 RX ORDER — INSULIN LISPRO 100 [IU]/ML
0-8 INJECTION, SOLUTION INTRAVENOUS; SUBCUTANEOUS
Status: DISCONTINUED | OUTPATIENT
Start: 2023-08-29 | End: 2023-09-08 | Stop reason: HOSPADM

## 2023-08-29 RX ORDER — INSULIN LISPRO 100 [IU]/ML
5 INJECTION, SOLUTION INTRAVENOUS; SUBCUTANEOUS
Status: DISCONTINUED | OUTPATIENT
Start: 2023-08-29 | End: 2023-09-07

## 2023-08-29 RX ORDER — INSULIN GLARGINE 100 [IU]/ML
20 INJECTION, SOLUTION SUBCUTANEOUS 2 TIMES DAILY
Status: DISCONTINUED | OUTPATIENT
Start: 2023-08-29 | End: 2023-09-07

## 2023-08-29 RX ORDER — SODIUM BICARBONATE 650 MG/1
650 TABLET ORAL 3 TIMES DAILY
Status: DISCONTINUED | OUTPATIENT
Start: 2023-08-29 | End: 2023-08-30

## 2023-08-29 RX ORDER — POTASSIUM CHLORIDE 20 MEQ/1
40 TABLET, EXTENDED RELEASE ORAL 2 TIMES DAILY
Status: COMPLETED | OUTPATIENT
Start: 2023-08-29 | End: 2023-08-29

## 2023-08-29 RX ADMIN — DOXEPIN HYDROCHLORIDE 25 MG: 25 CAPSULE ORAL at 21:06

## 2023-08-29 RX ADMIN — INSULIN LISPRO 2 UNITS: 100 INJECTION, SOLUTION INTRAVENOUS; SUBCUTANEOUS at 17:42

## 2023-08-29 RX ADMIN — POTASSIUM CHLORIDE 40 MEQ: 1500 TABLET, EXTENDED RELEASE ORAL at 21:05

## 2023-08-29 RX ADMIN — SODIUM CHLORIDE: 9 INJECTION, SOLUTION INTRAVENOUS at 22:33

## 2023-08-29 RX ADMIN — Medication 100 MG: at 09:27

## 2023-08-29 RX ADMIN — METHYLPREDNISOLONE SODIUM SUCCINATE 40 MG: 40 INJECTION, POWDER, FOR SOLUTION INTRAMUSCULAR; INTRAVENOUS at 17:41

## 2023-08-29 RX ADMIN — CEFTRIAXONE 1000 MG: 1 INJECTION, POWDER, FOR SOLUTION INTRAMUSCULAR; INTRAVENOUS at 13:28

## 2023-08-29 RX ADMIN — POTASSIUM CHLORIDE 40 MEQ: 1500 TABLET, EXTENDED RELEASE ORAL at 11:15

## 2023-08-29 RX ADMIN — SODIUM BICARBONATE 650 MG: 650 TABLET ORAL at 21:06

## 2023-08-29 RX ADMIN — INSULIN LISPRO 5 UNITS: 100 INJECTION, SOLUTION INTRAVENOUS; SUBCUTANEOUS at 17:41

## 2023-08-29 RX ADMIN — INSULIN LISPRO 2 UNITS: 100 INJECTION, SOLUTION INTRAVENOUS; SUBCUTANEOUS at 09:30

## 2023-08-29 RX ADMIN — ARFORMOTEROL TARTRATE 15 MCG: 15 SOLUTION RESPIRATORY (INHALATION) at 19:52

## 2023-08-29 RX ADMIN — MIRTAZAPINE 3.75 MG: 7.5 TABLET ORAL at 21:06

## 2023-08-29 RX ADMIN — Medication 3 MG: at 21:06

## 2023-08-29 RX ADMIN — AZITHROMYCIN MONOHYDRATE 500 MG: 500 INJECTION, POWDER, LYOPHILIZED, FOR SOLUTION INTRAVENOUS at 13:39

## 2023-08-29 RX ADMIN — METHYLPREDNISOLONE SODIUM SUCCINATE 40 MG: 40 INJECTION, POWDER, FOR SOLUTION INTRAMUSCULAR; INTRAVENOUS at 00:43

## 2023-08-29 RX ADMIN — INSULIN LISPRO 4 UNITS: 100 INJECTION, SOLUTION INTRAVENOUS; SUBCUTANEOUS at 04:15

## 2023-08-29 RX ADMIN — IPRATROPIUM BROMIDE AND ALBUTEROL SULFATE 1 DOSE: 2.5; .5 SOLUTION RESPIRATORY (INHALATION) at 15:56

## 2023-08-29 RX ADMIN — INSULIN LISPRO 4 UNITS: 100 INJECTION, SOLUTION INTRAVENOUS; SUBCUTANEOUS at 00:43

## 2023-08-29 RX ADMIN — METOPROLOL TARTRATE 6.25 MG: 25 TABLET, FILM COATED ORAL at 21:06

## 2023-08-29 RX ADMIN — METOPROLOL TARTRATE 6.25 MG: 25 TABLET, FILM COATED ORAL at 09:26

## 2023-08-29 RX ADMIN — ARFORMOTEROL TARTRATE 15 MCG: 15 SOLUTION RESPIRATORY (INHALATION) at 07:13

## 2023-08-29 RX ADMIN — CHLORDIAZEPOXIDE HYDROCHLORIDE 25 MG: 25 CAPSULE ORAL at 17:41

## 2023-08-29 RX ADMIN — FOLIC ACID 1 MG: 1 TABLET ORAL at 09:27

## 2023-08-29 RX ADMIN — INSULIN LISPRO 5 UNITS: 100 INJECTION, SOLUTION INTRAVENOUS; SUBCUTANEOUS at 13:26

## 2023-08-29 RX ADMIN — INSULIN LISPRO 4 UNITS: 100 INJECTION, SOLUTION INTRAVENOUS; SUBCUTANEOUS at 13:26

## 2023-08-29 RX ADMIN — IPRATROPIUM BROMIDE AND ALBUTEROL SULFATE 1 DOSE: 2.5; .5 SOLUTION RESPIRATORY (INHALATION) at 11:00

## 2023-08-29 RX ADMIN — ASPIRIN 81 MG: 81 TABLET, COATED ORAL at 09:26

## 2023-08-29 RX ADMIN — CHLORDIAZEPOXIDE HYDROCHLORIDE 25 MG: 25 CAPSULE ORAL at 21:07

## 2023-08-29 RX ADMIN — PANTOPRAZOLE SODIUM 40 MG: 40 TABLET, DELAYED RELEASE ORAL at 06:26

## 2023-08-29 RX ADMIN — CLOPIDOGREL BISULFATE 75 MG: 75 TABLET ORAL at 09:27

## 2023-08-29 RX ADMIN — METHYLPREDNISOLONE SODIUM SUCCINATE 40 MG: 40 INJECTION, POWDER, FOR SOLUTION INTRAMUSCULAR; INTRAVENOUS at 06:26

## 2023-08-29 RX ADMIN — INSULIN GLARGINE 20 UNITS: 100 INJECTION, SOLUTION SUBCUTANEOUS at 09:30

## 2023-08-29 RX ADMIN — IPRATROPIUM BROMIDE AND ALBUTEROL SULFATE 1 DOSE: 2.5; .5 SOLUTION RESPIRATORY (INHALATION) at 07:13

## 2023-08-29 RX ADMIN — ENOXAPARIN SODIUM 40 MG: 100 INJECTION SUBCUTANEOUS at 21:07

## 2023-08-29 RX ADMIN — INSULIN GLARGINE 20 UNITS: 100 INJECTION, SOLUTION SUBCUTANEOUS at 21:07

## 2023-08-29 RX ADMIN — BUDESONIDE 250 MCG: 0.25 SUSPENSION RESPIRATORY (INHALATION) at 19:50

## 2023-08-29 RX ADMIN — BUDESONIDE 250 MCG: 0.25 SUSPENSION RESPIRATORY (INHALATION) at 07:13

## 2023-08-29 RX ADMIN — CHLORDIAZEPOXIDE HYDROCHLORIDE 25 MG: 25 CAPSULE ORAL at 09:28

## 2023-08-29 RX ADMIN — IPRATROPIUM BROMIDE AND ALBUTEROL SULFATE 1 DOSE: 2.5; .5 SOLUTION RESPIRATORY (INHALATION) at 19:50

## 2023-08-29 RX ADMIN — CHLORDIAZEPOXIDE HYDROCHLORIDE 25 MG: 25 CAPSULE ORAL at 13:39

## 2023-08-29 RX ADMIN — SODIUM BICARBONATE 650 MG: 650 TABLET ORAL at 13:32

## 2023-08-29 RX ADMIN — INSULIN LISPRO 5 UNITS: 100 INJECTION, SOLUTION INTRAVENOUS; SUBCUTANEOUS at 09:29

## 2023-08-29 NOTE — PROGRESS NOTES
Vascular lab preliminary results. Bilateral lower extremity arterial segmental exam performed. Right:  PT 1.02  DP 1.13  Digit 0.63    Left:  PT 1.02  DP 1.06  Digit 0.68    Final report pending.

## 2023-08-29 NOTE — PROGRESS NOTES
On admit pt told aide that he had not eaten in 3-4 days because he could not get to kitchen from [address] [facility] rn

## 2023-08-29 NOTE — PROGRESS NOTES
Physician Progress Note      Jamey Colon  CSN #:                  [id number]  :                       1955  ADMIT DATE:       2023 11:48 AM  DISCH DATE:  RESPONDING  PROVIDER #:        Pilo Fragoso MD          QUERY TEXT:    Pt admitted with COPD. Pt noted to have 2 - 4 liters nasal cannula with   respirations up to 30. If possible, please document in the progress notes and   discharge summary if you are evaluating and/or treating any of the following: The medical record reflects the following:  Risk Factors: COPD, tobacco abuse, alcohol abuse  Clinical Indicators: RR 30, requires oxygen to keep Sat's above 90% currently   on 3 liters. No oxygen at home. ABG  Sat 94% on 2 liters, p02 77. SOB, cough,   chest tightness, fatigue. Respiratory distress and wheezing in ED. Treatment: ABG, Labs, oxygen2 - 4 liters, Brovana, Pulmicort, Duonebs,   Solumedrol 40 mg IV, Prednisone 40 mg po,    Thank you  Nicki Lopez RN, BSN, [facility]  [phone]  Options provided:  -- Chronic respiratory failure with hypoxia  -- Chronic respiratory failure with hypercapnia  -- Chronic respiratory failure with hypoxia and hypercapnia  -- Acute on chronic respiratory failure with hypoxia  -- Acute on chronic respiratory failure with hypercapnia  -- Acute on chronic respiratory failure with hypoxia and hypercapnia  -- Hypoxia:  This patient has Hypoxia. -- Other - I will add my own diagnosis  -- Disagree - Not applicable / Not valid  -- Disagree - Clinically unable to determine / Unknown  -- Refer to Clinical Documentation Reviewer    PROVIDER RESPONSE TEXT:    Provider is clinically unable to determine a response to this query. pt with compensatory tachypnea RR 30+ secondary to severe metabolic acidosis   with appropriate respiratory compensation. O2 NC started for comfort. Resp   failure ruled out.     Query created by: Nicki Lopez on 2023 8:43 AM      Electronically signed by:  Bea Corrigan

## 2023-08-29 NOTE — CONSULTS
fields. CVS: regular rate and rhythm  Abdominal: soft, nontender, normal bowel sounds  Extremities: no cyanosis or edema  Skin: warm and dry without rash      Labs:  BMP:   Recent Labs     08/28/23  0021 08/28/23  1242 08/29/23  0139   * 140 143   K 4.2 3.5 3.4*   CL 99 107 107   CO2 13* 10* 18*   PHOS  --  2.2* 2.5   BUN 64* 60* 59*   CREATININE 1.7* 1.4* 1.9*   CALCIUM 8.7* 8.6* 8.6*     CBC:   Recent Labs     08/27/23  1157 08/28/23  0021 08/29/23 0139   WBC 8.2 4.4* 4.1*   HGB 9.7* 9.8* 8.2*   HCT 29.7* 29.7* 24.9*   .8* 118.8* 118.6*    141 123*     LIVER PROFILE:   Recent Labs     08/27/23  1157 08/28/23  0021   AST 23 19   ALT 9 9   LIPASE  --  92*   BILITOT 0.9 0.6   ALKPHOS 66 59     PT/INR: No results for input(s): PROTIME, INR in the last 72 hours. APTT: No results for input(s): APTT in the last 72 hours. BNP:  No results for input(s): BNP in the last 72 hours. Ionized Calcium:No results for input(s): IONCA in the last 72 hours. Magnesium:  Recent Labs     08/27/23  1157   MG 1.7     Phosphorus:  Recent Labs     08/28/23  1242 08/29/23  0139   PHOS 2.2* 2.5     HgbA1C:   Recent Labs     08/27/23  1835   LABA1C 6.4*     Hepatic:   Recent Labs     08/27/23  1157 08/28/23  0021 08/28/23  1242 08/29/23  0139   ALKPHOS 66 59  --   --    ALT 9 9  --   --    AST 23 19  --   --    PROT 7.4 6.0*  --   --    BILITOT 0.9 0.6  --   --    LABALBU 3.8 3.6 3.1* 3.6     Lactic Acid:   Recent Labs     08/27/23  1450   LACTA 1.8     Troponin: No results for input(s): CKTOTAL, CKMB, TROPONINT in the last 72 hours. ABGs: No results for input(s): PH, PCO2, PO2, HCO3, O2SAT in the last 72 hours. CRP:  No results for input(s): CRP in the last 72 hours. Sed Rate:  No results for input(s): SEDRATE in the last 72 hours. Cultures:   No results for input(s): CULTURE in the last 72 hours. Recent Labs     08/27/23  1235   BC No Growth to date. Any change in status will be called.    BLOODCULT2 No Matti Fraga D.O. Date:     08/27/2023 Time:    14:48     NM LUNG SCAN PERFUSION ONLY    Result Date: 8/27/2023  EXAM: LUNG PERFUSION SCAN. HISTORY: Shortness of breath, elevated D-dimer  COMPARISONS: Chest radiograph from same day. VQ scan 06/14/2022  TECHNIQUE:  5.0 mCi macroaggregated albumin utilized for perfusion. FINDINGS: Examination is limited by perfusion only exam.  No moderately large segmental perfusion defect is seen. Small non-segmental perfusion defects are suggested. Low probability for pulmonary embolism. ______________________________________ Electronically signed by: Floridalma Sheehan M.D. Date:     08/27/2023 Time:    16:26     XR CHEST PORTABLE    Result Date: 8/27/2023  EXAM: FRONTAL CHEST RADIOGRAPH(S).  1 VIEW. History: Shortness of breath  Comparison: 08/24/2023      Findings / Impression: Stable cardiomegaly. Sabas Craw No pleural effusion or pneumothorax. No focal airspace opacification. ______________________________________ Electronically signed by: Matti Fraga D.O. Date:     08/27/2023 Time:    14:40        Assessment   1. Stage IIIa chronic kidney disease baseline. 2.  Type II diabetic nephropathy. 3.  Metabolic acidemia. 4.  Hypokalemia. 5.  COPD exacerbation. 6.  History of alcohol abuse. Plan:  1. Agree with IV fluid. 2.  Sodium bicarbonate p.o.  3.  Potassium supplement. 4.  Urinary protein to creatinine ratio. 5.  Baseline renal ultrasound. Thank you for the consult, we appreciate the opportunity to provide care to your patients. Feel free to contact me if I can be of any further assistance.       Sandrine Elliott MD  08/29/23  11:45 AM

## 2023-08-29 NOTE — CARE COORDINATION
Viki spoke to LENCHO at [facility] [phone] Fax [phone]. Viki told admissions coordinator that Pt has agreed to d/c on 9/1 to SNF.    Electronically signed by SHANDRA Bond on 8/29/2023 at 8:40 AM

## 2023-08-30 LAB
ALBUMIN SERPL-MCNC: 3 G/DL (ref 3.5–5.2)
ALP SERPL-CCNC: 50 U/L (ref 40–130)
ALT SERPL-CCNC: 14 U/L (ref 5–41)
ANION GAP SERPL CALCULATED.3IONS-SCNC: 18 MMOL/L (ref 7–19)
AST SERPL-CCNC: 29 U/L (ref 5–40)
BASOPHILS # BLD: 0 K/UL (ref 0–0.2)
BASOPHILS NFR BLD: 0 % (ref 0–1)
BILIRUB SERPL-MCNC: 0.4 MG/DL (ref 0.2–1.2)
BUN SERPL-MCNC: 46 MG/DL (ref 8–23)
CALCIUM SERPL-MCNC: 8 MG/DL (ref 8.8–10.2)
CHLORIDE SERPL-SCNC: 109 MMOL/L (ref 98–111)
CO2 SERPL-SCNC: 16 MMOL/L (ref 22–29)
CREAT SERPL-MCNC: 1.5 MG/DL (ref 0.5–1.2)
EKG P AXIS: NORMAL DEGREES
EKG P-R INTERVAL: NORMAL MS
EKG Q-T INTERVAL: 388 MS
EKG QRS DURATION: 82 MS
EKG QTC CALCULATION (BAZETT): 430 MS
EKG T AXIS: -31 DEGREES
EOSINOPHIL # BLD: 0 K/UL (ref 0–0.6)
EOSINOPHIL NFR BLD: 0.3 % (ref 0–5)
ERYTHROCYTE [DISTWIDTH] IN BLOOD BY AUTOMATED COUNT: 15.3 % (ref 11.5–14.5)
FERRITIN SERPL-MCNC: 1071 NG/ML (ref 30–400)
GLUCOSE BLD-MCNC: 195 MG/DL (ref 70–99)
GLUCOSE BLD-MCNC: 229 MG/DL (ref 70–99)
GLUCOSE BLD-MCNC: 245 MG/DL (ref 70–99)
GLUCOSE BLD-MCNC: 254 MG/DL (ref 70–99)
GLUCOSE SERPL-MCNC: 234 MG/DL (ref 74–109)
HCT VFR BLD AUTO: 23.6 % (ref 42–52)
HGB BLD-MCNC: 7.5 G/DL (ref 14–18)
IMM GRANULOCYTES # BLD: 0.1 K/UL
LYMPHOCYTES # BLD: 0.3 K/UL (ref 1.1–4.5)
LYMPHOCYTES NFR BLD: 6.8 % (ref 20–40)
MCH RBC QN AUTO: 39.9 PG (ref 27–31)
MCHC RBC AUTO-ENTMCNC: 31.8 G/DL (ref 33–37)
MCV RBC AUTO: 125.5 FL (ref 80–94)
MONOCYTES # BLD: 0.2 K/UL (ref 0–0.9)
MONOCYTES NFR BLD: 4.6 % (ref 0–10)
NEUTROPHILS # BLD: 3.4 K/UL (ref 1.5–7.5)
NEUTS SEG NFR BLD: 85.5 % (ref 50–65)
PERFORMED ON: ABNORMAL
PHOSPHATE SERPL-MCNC: 3 MG/DL (ref 2.5–4.5)
PLATELET # BLD AUTO: 100 K/UL (ref 130–400)
PMV BLD AUTO: 11.5 FL (ref 9.4–12.4)
POTASSIUM SERPL-SCNC: 4.2 MMOL/L (ref 3.5–5)
PROCALCITONIN: 3.24 NG/ML (ref 0–0.09)
PROT SERPL-MCNC: 5.2 G/DL (ref 6.6–8.7)
RBC # BLD AUTO: 1.88 M/UL (ref 4.7–6.1)
SODIUM SERPL-SCNC: 143 MMOL/L (ref 136–145)
WBC # BLD AUTO: 4 K/UL (ref 4.8–10.8)

## 2023-08-30 PROCEDURE — 94760 N-INVAS EAR/PLS OXIMETRY 1: CPT

## 2023-08-30 PROCEDURE — 84145 PROCALCITONIN (PCT): CPT

## 2023-08-30 PROCEDURE — 82962 GLUCOSE BLOOD TEST: CPT

## 2023-08-30 PROCEDURE — 6370000000 HC RX 637 (ALT 250 FOR IP): Performed by: INTERNAL MEDICINE

## 2023-08-30 PROCEDURE — 93005 ELECTROCARDIOGRAM TRACING: CPT

## 2023-08-30 PROCEDURE — 85025 COMPLETE CBC W/AUTO DIFF WBC: CPT

## 2023-08-30 PROCEDURE — 94640 AIRWAY INHALATION TREATMENT: CPT

## 2023-08-30 PROCEDURE — 6360000002 HC RX W HCPCS

## 2023-08-30 PROCEDURE — 93010 ELECTROCARDIOGRAM REPORT: CPT | Performed by: INTERNAL MEDICINE

## 2023-08-30 PROCEDURE — 2140000000 HC CCU INTERMEDIATE R&B

## 2023-08-30 PROCEDURE — 36415 COLL VENOUS BLD VENIPUNCTURE: CPT

## 2023-08-30 PROCEDURE — 2580000003 HC RX 258: Performed by: INTERNAL MEDICINE

## 2023-08-30 PROCEDURE — 6370000000 HC RX 637 (ALT 250 FOR IP)

## 2023-08-30 PROCEDURE — 80053 COMPREHEN METABOLIC PANEL: CPT

## 2023-08-30 PROCEDURE — 84100 ASSAY OF PHOSPHORUS: CPT

## 2023-08-30 PROCEDURE — 6360000002 HC RX W HCPCS: Performed by: INTERNAL MEDICINE

## 2023-08-30 PROCEDURE — 2700000000 HC OXYGEN THERAPY PER DAY

## 2023-08-30 PROCEDURE — 2580000003 HC RX 258

## 2023-08-30 RX ORDER — SODIUM BICARBONATE 650 MG/1
1300 TABLET ORAL 3 TIMES DAILY
Status: DISCONTINUED | OUTPATIENT
Start: 2023-08-30 | End: 2023-09-08

## 2023-08-30 RX ADMIN — CHLORDIAZEPOXIDE HYDROCHLORIDE 25 MG: 25 CAPSULE ORAL at 21:27

## 2023-08-30 RX ADMIN — SODIUM BICARBONATE 1300 MG: 650 TABLET ORAL at 13:30

## 2023-08-30 RX ADMIN — INSULIN LISPRO 5 UNITS: 100 INJECTION, SOLUTION INTRAVENOUS; SUBCUTANEOUS at 08:54

## 2023-08-30 RX ADMIN — IPRATROPIUM BROMIDE AND ALBUTEROL SULFATE 1 DOSE: 2.5; .5 SOLUTION RESPIRATORY (INHALATION) at 19:01

## 2023-08-30 RX ADMIN — SODIUM CHLORIDE: 9 INJECTION, SOLUTION INTRAVENOUS at 08:56

## 2023-08-30 RX ADMIN — BUDESONIDE 250 MCG: 0.25 SUSPENSION RESPIRATORY (INHALATION) at 19:01

## 2023-08-30 RX ADMIN — IPRATROPIUM BROMIDE AND ALBUTEROL SULFATE 1 DOSE: 2.5; .5 SOLUTION RESPIRATORY (INHALATION) at 11:07

## 2023-08-30 RX ADMIN — FOLIC ACID 1 MG: 1 TABLET ORAL at 08:53

## 2023-08-30 RX ADMIN — CHLORDIAZEPOXIDE HYDROCHLORIDE 25 MG: 25 CAPSULE ORAL at 17:28

## 2023-08-30 RX ADMIN — Medication 100 MG: at 08:52

## 2023-08-30 RX ADMIN — CHLORDIAZEPOXIDE HYDROCHLORIDE 25 MG: 25 CAPSULE ORAL at 13:30

## 2023-08-30 RX ADMIN — INSULIN GLARGINE 20 UNITS: 100 INJECTION, SOLUTION SUBCUTANEOUS at 08:53

## 2023-08-30 RX ADMIN — INSULIN LISPRO 5 UNITS: 100 INJECTION, SOLUTION INTRAVENOUS; SUBCUTANEOUS at 17:28

## 2023-08-30 RX ADMIN — METOPROLOL TARTRATE 6.25 MG: 25 TABLET, FILM COATED ORAL at 08:53

## 2023-08-30 RX ADMIN — SODIUM BICARBONATE 650 MG: 650 TABLET ORAL at 08:53

## 2023-08-30 RX ADMIN — AZITHROMYCIN MONOHYDRATE 500 MG: 500 INJECTION, POWDER, LYOPHILIZED, FOR SOLUTION INTRAVENOUS at 13:30

## 2023-08-30 RX ADMIN — BUDESONIDE 250 MCG: 0.25 SUSPENSION RESPIRATORY (INHALATION) at 07:22

## 2023-08-30 RX ADMIN — INSULIN GLARGINE 20 UNITS: 100 INJECTION, SOLUTION SUBCUTANEOUS at 21:26

## 2023-08-30 RX ADMIN — SODIUM CHLORIDE, PRESERVATIVE FREE 10 ML: 5 INJECTION INTRAVENOUS at 21:30

## 2023-08-30 RX ADMIN — Medication 3 MG: at 21:27

## 2023-08-30 RX ADMIN — ARFORMOTEROL TARTRATE 15 MCG: 15 SOLUTION RESPIRATORY (INHALATION) at 07:22

## 2023-08-30 RX ADMIN — METOPROLOL TARTRATE 6.25 MG: 25 TABLET, FILM COATED ORAL at 21:27

## 2023-08-30 RX ADMIN — DOXEPIN HYDROCHLORIDE 25 MG: 25 CAPSULE ORAL at 21:27

## 2023-08-30 RX ADMIN — CHLORDIAZEPOXIDE HYDROCHLORIDE 25 MG: 25 CAPSULE ORAL at 08:53

## 2023-08-30 RX ADMIN — INSULIN LISPRO 2 UNITS: 100 INJECTION, SOLUTION INTRAVENOUS; SUBCUTANEOUS at 13:30

## 2023-08-30 RX ADMIN — ARFORMOTEROL TARTRATE 15 MCG: 15 SOLUTION RESPIRATORY (INHALATION) at 19:01

## 2023-08-30 RX ADMIN — CLOPIDOGREL BISULFATE 75 MG: 75 TABLET ORAL at 08:53

## 2023-08-30 RX ADMIN — MIRTAZAPINE 3.75 MG: 7.5 TABLET ORAL at 21:26

## 2023-08-30 RX ADMIN — IPRATROPIUM BROMIDE AND ALBUTEROL SULFATE 1 DOSE: 2.5; .5 SOLUTION RESPIRATORY (INHALATION) at 07:22

## 2023-08-30 RX ADMIN — INSULIN LISPRO 4 UNITS: 100 INJECTION, SOLUTION INTRAVENOUS; SUBCUTANEOUS at 17:28

## 2023-08-30 RX ADMIN — INSULIN LISPRO 5 UNITS: 100 INJECTION, SOLUTION INTRAVENOUS; SUBCUTANEOUS at 13:32

## 2023-08-30 RX ADMIN — METHYLPREDNISOLONE SODIUM SUCCINATE 40 MG: 40 INJECTION, POWDER, FOR SOLUTION INTRAMUSCULAR; INTRAVENOUS at 17:28

## 2023-08-30 RX ADMIN — ENOXAPARIN SODIUM 40 MG: 100 INJECTION SUBCUTANEOUS at 21:30

## 2023-08-30 RX ADMIN — ASPIRIN 81 MG: 81 TABLET, COATED ORAL at 08:53

## 2023-08-30 RX ADMIN — METHYLPREDNISOLONE SODIUM SUCCINATE 40 MG: 40 INJECTION, POWDER, FOR SOLUTION INTRAMUSCULAR; INTRAVENOUS at 05:41

## 2023-08-30 RX ADMIN — IPRATROPIUM BROMIDE AND ALBUTEROL SULFATE 1 DOSE: 2.5; .5 SOLUTION RESPIRATORY (INHALATION) at 14:36

## 2023-08-30 RX ADMIN — SODIUM BICARBONATE 1300 MG: 650 TABLET ORAL at 21:26

## 2023-08-30 RX ADMIN — CEFTRIAXONE 1000 MG: 1 INJECTION, POWDER, FOR SOLUTION INTRAMUSCULAR; INTRAVENOUS at 13:23

## 2023-08-30 RX ADMIN — PANTOPRAZOLE SODIUM 40 MG: 40 TABLET, DELAYED RELEASE ORAL at 05:40

## 2023-08-30 NOTE — PROGRESS NOTES
Nephrology ([facility]) Progress Note      Patient:  Lisa Joshi  YOB: 1955  Date of Service: 8/30/2023  MRN: [id number]   Acct: [de-identified]   Primary Care Physician: No primary care provider on file. Advance Directive: Full Code  Admit Date: 8/27/2023       Hospital Day: 3  Referring Provider: Go Iqbal MD    Patient independently seen and examined, Chart, Consults, Notes, Operative notes, Labs, Cardiology, and Radiology studies reviewed as available. Chief complaint: Abnormal labs. Subjective:  Lisa Joshi is a 79 y.o. male for whom we were consulted for evaluation and treatment of acute kidney injury/chronic kidney disease. Patient has chronic kidney disease/unknown stage, follows with nephrology in [place]. He also has history of alcohol abuse, CVA, COPD, gastroesophageal reflux disease, hyperlipidemia, hypertension and chronic smoker. He presented to emergency room with increasing shortness of breath associated with chest tightness. He also reported generalized fatigue, lack of energy and tiredness. He continues to smoke cigarette and drink alcohol almost every day. He is now admitted for treatment of COPD exacerbation. His serum creatinine on admission was 1.8 mg. This morning he is feeling much better. He is currently being treated for COPD exacerbation.     Allergies:  Influenza virus vaccine and Niacin and related    Medicines:  Current Facility-Administered Medications   Medication Dose Route Frequency Provider Last Rate Last Admin    insulin glargine (LANTUS) injection vial 20 Units  20 Units SubCUTAneous BID Landry Iglesias MD   20 Units at 08/30/23 0853    insulin lispro (HUMALOG) injection vial 5 Units  5 Units SubCUTAneous TID  Landry Iglesias MD   5 Units at 08/30/23 0854    insulin lispro (HUMALOG) injection vial 0-8 Units  0-8 Units SubCUTAneous TID  Landry Iglesias MD   2 Units at 08/29/23 4755 hours. Recent Labs     08/27/23  1235   BC No Growth to date. Any change in status will be called. BLOODCULT2 No Growth to date. Any change in status will be called. No results for input(s): CXSURG in the last 72 hours. Radiology reports as per the Radiologist  Radiology: CT ABDOMEN PELVIS WO CONTRAST Additional Contrast? None    Result Date: 8/28/2023  EXAM: CT ABDOMEN AND PELVIS WITHOUT CONTRAST  HISTORY: abdominal pain  TECHNIQUE: CT acquisition of the abdomen and pelvis without IV contrast administration. CT dose reduction techniques performed: Yes. Coronal and sagittal reconstructions were performed. COMPARISON: CT abdomen pelvis 08/25/2016  FINDINGS: Lung bases clear. Liver normal.  Hyperdense material in the gallbladder, possibly sludge. Normal spleen, pancreas, adrenal glands. Nonspecific bilateral perinephric stranding. No ureteral obstruction. Normal urinary bladder. No free fluid or free gas. Severe calcific atherosclerosis. Normal stomach and small bowel. No evidence of acute appendicitis. Colonic diverticulosis without focal diverticulitis. Gas in the left anterior abdominal subcutaneous fat, likely from medication injection. No acute findings in the bones. Degenerative changes in the spine and both hips. Impression: Possible gallbladder sludge. Nonspecific bilateral perinephric stranding. No ureteral obstruction. Colonic diverticulosis. All CT scans are performed using dose optimization techniques as appropriate to the performed exam and include at least one of the following: Automated exposure control, adjustment of the mA and/or kV according to size, and the use of iterative reconstruction technique. ______________________________________ Electronically signed by: Dillon Lynn D.O. Date:     08/28/2023 Time:    16:46     XR FOOT LEFT (MIN 3 VIEWS)    Result Date: 8/27/2023  EXAM: LEFT FOOT RADIOGRAPH(S). 3 VIEWS.   History: left foot pain  Comparison: none

## 2023-08-30 NOTE — PROGRESS NOTES
III ([address]) 8/27/2023 Yes       Principal Problem:    COPD with acute exacerbation ([address])  Active Problems:    Sepsis ([address])    Alcohol abuse    Respiratory alkalosis    CKD (chronic kidney disease), stage III ([address])  Resolved Problems:    * No resolved hospital problems. *          Brief History/Summary:   Mr. Fortino Zarate, a 79-year-old male smoker, history of CVA, COPD, EtOH abuse, presenting to [facility] (08/27/2023), on account of shortness of breath. Patient reportedly stopped taking all of his meds about a year ago. Patient admitted to [facility], for further work-up and management    COPD, with acute exacerbation  -Oxygen supplementation to keep SPO2 between 88-92%  - Nebulized Bronchodilators scheduled and on as-needed basis. --> Ipratropium-Albuterol (DuoNeb's) nebulizer every 6 hours scheduled  --> Albuterol nebulizer every 6 hours when necessary for shortness of breath and/or wheezing. Arformoterol Tartrate (BROVANA) 15 mcg  Nebs BID   Budesonide (PULMICORT)  500 mcg  Neb Q12H   - Systemic Steroids    --> Methylprednisolone (Solu-Medrol) 40 mg IV every 8 hours  - IV antibiotics   - Supplemental oxygen for respiratory failure. - Continue to monitor    AGMA - severe. Suspect this to be primary  of his severe dyspnea. At this time primary source of this appears to be florid renal failure. LA elevated though mild. EtOH was negative. Salicylate negative. No DKA. Cont IVF    LES - prerenal injury vs ATN. UA reassuring   CT abd pelvis - no signs of  obstruction   Nephrology on board     Folic  Deficiency  Folate<2  Cont PO replacement. Alcohol abuse. Denies Hx of withdrawal.   Reports stopped drinking 3 days ago  Monitor for symptoms of withdrawal.   Stop CIWA. Librium PO scheduled - tolerating well. Hyperglycemia - likely due to steroid. A1C 6.4  Lantus 20BID. Prand bolus 5  ISS  Hypoglycemia precautions. Weaning steroid - monitor BG closely.             Continue management of other chronic medical conditions - see above and orders. Advance Directive: Full Code    ADULT ORAL NUTRITION SUPPLEMENT; Breakfast, Lunch, Dinner; Diabetic Oral Supplement  ADULT DIET; Regular; 4 carb choices (60 gm/meal); Low Sodium (2 gm); No Caffeine         Consults Made:   IP CONSULT TO SOCIAL WORK  IP CONSULT TO NEPHROLOGY      DVT prophylaxis: Enoxaparin    Current medications reviewed  Lab work reviewed  Radiology  films reviewed  Much appreciated treatment recommendations from suspecialities reviewed  Discussed treatment plan with the nurse and addressed all questions/concerns  Discussed with Patient at the bedside in detail . .. he understand and agree with the management plan.       Discharge planning: tbd    Multiple complex medical problems  Morbidity mortality high risk  Medical decision making High complexity         Electronically signed by   Ortega Oliva MD,   Internal Medicine Hospitalist   8/30/2023 10:45 AM

## 2023-08-30 NOTE — PROGRESS NOTES
Pt had exp wheezes this am. Now audible crackles and crackles x4. Cr 1.5, ns @150cc/hr.   [email or web address] rn

## 2023-08-31 ENCOUNTER — APPOINTMENT (OUTPATIENT)
Dept: GENERAL RADIOLOGY | Age: 68
End: 2023-08-31
Payer: MEDICARE

## 2023-08-31 LAB
ALBUMIN SERPL-MCNC: 3 G/DL (ref 3.5–5.2)
ALBUMIN SERPL-MCNC: 3.1 G/DL (ref 3.5–5.2)
ALLENS TEST: ABNORMAL
ALP SERPL-CCNC: 50 U/L (ref 40–130)
ALT SERPL-CCNC: 39 U/L (ref 5–41)
ANION GAP SERPL CALCULATED.3IONS-SCNC: 12 MMOL/L (ref 7–19)
ANION GAP SERPL CALCULATED.3IONS-SCNC: 12 MMOL/L (ref 7–19)
AST SERPL-CCNC: 49 U/L (ref 5–40)
BASE EXCESS ARTERIAL: -3.2 MMOL/L (ref -2–2)
BILIRUB SERPL-MCNC: 0.4 MG/DL (ref 0.2–1.2)
BUN SERPL-MCNC: 37 MG/DL (ref 8–23)
BUN SERPL-MCNC: 41 MG/DL (ref 8–23)
CALCIUM SERPL-MCNC: 8.1 MG/DL (ref 8.8–10.2)
CALCIUM SERPL-MCNC: 8.3 MG/DL (ref 8.8–10.2)
CARBOXYHEMOGLOBIN ARTERIAL: 2.1 % (ref 0–5)
CHLORIDE SERPL-SCNC: 108 MMOL/L (ref 98–111)
CHLORIDE SERPL-SCNC: 108 MMOL/L (ref 98–111)
CO2 SERPL-SCNC: 18 MMOL/L (ref 22–29)
CO2 SERPL-SCNC: 20 MMOL/L (ref 22–29)
CREAT SERPL-MCNC: 1.1 MG/DL (ref 0.5–1.2)
CREAT SERPL-MCNC: 1.2 MG/DL (ref 0.5–1.2)
EKG P AXIS: 70 DEGREES
EKG P AXIS: 74 DEGREES
EKG P-R INTERVAL: 160 MS
EKG P-R INTERVAL: 176 MS
EKG Q-T INTERVAL: 370 MS
EKG Q-T INTERVAL: 396 MS
EKG QRS DURATION: 88 MS
EKG QRS DURATION: 88 MS
EKG QTC CALCULATION (BAZETT): 427 MS
EKG QTC CALCULATION (BAZETT): 447 MS
EKG T AXIS: -56 DEGREES
EKG T AXIS: 45 DEGREES
ERYTHROCYTE [DISTWIDTH] IN BLOOD BY AUTOMATED COUNT: 15.5 % (ref 11.5–14.5)
GLUCOSE BLD-MCNC: 177 MG/DL (ref 70–99)
GLUCOSE BLD-MCNC: 203 MG/DL (ref 70–99)
GLUCOSE BLD-MCNC: 213 MG/DL (ref 70–99)
GLUCOSE BLD-MCNC: 282 MG/DL (ref 70–99)
GLUCOSE SERPL-MCNC: 178 MG/DL (ref 74–109)
GLUCOSE SERPL-MCNC: 258 MG/DL (ref 74–109)
HCO3 ARTERIAL: 20.2 MMOL/L (ref 22–26)
HCT VFR BLD AUTO: 24 % (ref 42–52)
HEMOGLOBIN, ART, EXTENDED: 8.5 G/DL (ref 14–18)
HGB BLD-MCNC: 7.6 G/DL (ref 14–18)
MCH RBC QN AUTO: 39.2 PG (ref 27–31)
MCHC RBC AUTO-ENTMCNC: 31.7 G/DL (ref 33–37)
MCV RBC AUTO: 123.7 FL (ref 80–94)
METHEMOGLOBIN ARTERIAL: 1.6 %
O2 CONTENT ARTERIAL: 11.7 ML/DL
O2 DELIVERY DEVICE: ABNORMAL
O2 SAT, ARTERIAL: 96.1 %
O2 THERAPY: ABNORMAL
OXYGEN FLOW: 3
PCO2 ARTERIAL: 29 MMHG (ref 35–45)
PERFORMED ON: ABNORMAL
PH ARTERIAL: 7.45 (ref 7.35–7.45)
PHOSPHATE SERPL-MCNC: 2.5 MG/DL (ref 2.5–4.5)
PHOSPHATE SERPL-MCNC: 3.4 MG/DL (ref 2.5–4.5)
PLATELET # BLD AUTO: 97 K/UL (ref 130–400)
PMV BLD AUTO: 11.4 FL (ref 9.4–12.4)
PO2 ARTERIAL: 110 MMHG (ref 80–100)
POTASSIUM BLD-SCNC: 4.4 MMOL/L
POTASSIUM SERPL-SCNC: 4.5 MMOL/L (ref 3.5–5)
POTASSIUM SERPL-SCNC: 4.8 MMOL/L (ref 3.5–5)
PROT SERPL-MCNC: 5.3 G/DL (ref 6.6–8.7)
RBC # BLD AUTO: 1.94 M/UL (ref 4.7–6.1)
SAMPLE SOURCE: ABNORMAL
SODIUM SERPL-SCNC: 138 MMOL/L (ref 136–145)
SODIUM SERPL-SCNC: 140 MMOL/L (ref 136–145)
WBC # BLD AUTO: 5.2 K/UL (ref 4.8–10.8)

## 2023-08-31 PROCEDURE — 84100 ASSAY OF PHOSPHORUS: CPT

## 2023-08-31 PROCEDURE — 94760 N-INVAS EAR/PLS OXIMETRY 1: CPT

## 2023-08-31 PROCEDURE — 85027 COMPLETE CBC AUTOMATED: CPT

## 2023-08-31 PROCEDURE — 71045 X-RAY EXAM CHEST 1 VIEW: CPT

## 2023-08-31 PROCEDURE — 2580000003 HC RX 258

## 2023-08-31 PROCEDURE — 82962 GLUCOSE BLOOD TEST: CPT

## 2023-08-31 PROCEDURE — 6360000002 HC RX W HCPCS: Performed by: INTERNAL MEDICINE

## 2023-08-31 PROCEDURE — 6370000000 HC RX 637 (ALT 250 FOR IP)

## 2023-08-31 PROCEDURE — 36415 COLL VENOUS BLD VENIPUNCTURE: CPT

## 2023-08-31 PROCEDURE — 97162 PT EVAL MOD COMPLEX 30 MIN: CPT

## 2023-08-31 PROCEDURE — 6370000000 HC RX 637 (ALT 250 FOR IP): Performed by: INTERNAL MEDICINE

## 2023-08-31 PROCEDURE — 93005 ELECTROCARDIOGRAM TRACING: CPT

## 2023-08-31 PROCEDURE — 82803 BLOOD GASES ANY COMBINATION: CPT

## 2023-08-31 PROCEDURE — 97165 OT EVAL LOW COMPLEX 30 MIN: CPT

## 2023-08-31 PROCEDURE — 80053 COMPREHEN METABOLIC PANEL: CPT

## 2023-08-31 PROCEDURE — 2140000000 HC CCU INTERMEDIATE R&B

## 2023-08-31 PROCEDURE — 97530 THERAPEUTIC ACTIVITIES: CPT

## 2023-08-31 PROCEDURE — 6360000002 HC RX W HCPCS

## 2023-08-31 PROCEDURE — 94640 AIRWAY INHALATION TREATMENT: CPT

## 2023-08-31 PROCEDURE — 2700000000 HC OXYGEN THERAPY PER DAY

## 2023-08-31 RX ADMIN — METOPROLOL TARTRATE 6.25 MG: 25 TABLET, FILM COATED ORAL at 20:28

## 2023-08-31 RX ADMIN — CHLORDIAZEPOXIDE HYDROCHLORIDE 25 MG: 25 CAPSULE ORAL at 20:30

## 2023-08-31 RX ADMIN — CEFTRIAXONE 1000 MG: 1 INJECTION, POWDER, FOR SOLUTION INTRAMUSCULAR; INTRAVENOUS at 13:44

## 2023-08-31 RX ADMIN — CHLORDIAZEPOXIDE HYDROCHLORIDE 25 MG: 25 CAPSULE ORAL at 08:36

## 2023-08-31 RX ADMIN — SODIUM BICARBONATE 1300 MG: 650 TABLET ORAL at 14:24

## 2023-08-31 RX ADMIN — IPRATROPIUM BROMIDE AND ALBUTEROL SULFATE 1 DOSE: 2.5; .5 SOLUTION RESPIRATORY (INHALATION) at 10:04

## 2023-08-31 RX ADMIN — METHYLPREDNISOLONE SODIUM SUCCINATE 40 MG: 40 INJECTION, POWDER, FOR SOLUTION INTRAMUSCULAR; INTRAVENOUS at 06:31

## 2023-08-31 RX ADMIN — ASPIRIN 81 MG: 81 TABLET, COATED ORAL at 08:35

## 2023-08-31 RX ADMIN — AZITHROMYCIN MONOHYDRATE 500 MG: 500 INJECTION, POWDER, LYOPHILIZED, FOR SOLUTION INTRAVENOUS at 13:43

## 2023-08-31 RX ADMIN — ARFORMOTEROL TARTRATE 15 MCG: 15 SOLUTION RESPIRATORY (INHALATION) at 18:45

## 2023-08-31 RX ADMIN — METOPROLOL TARTRATE 6.25 MG: 25 TABLET, FILM COATED ORAL at 08:36

## 2023-08-31 RX ADMIN — Medication 100 MG: at 08:36

## 2023-08-31 RX ADMIN — Medication 3 MG: at 20:29

## 2023-08-31 RX ADMIN — INSULIN GLARGINE 20 UNITS: 100 INJECTION, SOLUTION SUBCUTANEOUS at 08:37

## 2023-08-31 RX ADMIN — IPRATROPIUM BROMIDE AND ALBUTEROL SULFATE 1 DOSE: 2.5; .5 SOLUTION RESPIRATORY (INHALATION) at 14:36

## 2023-08-31 RX ADMIN — INSULIN LISPRO 5 UNITS: 100 INJECTION, SOLUTION INTRAVENOUS; SUBCUTANEOUS at 13:46

## 2023-08-31 RX ADMIN — METHYLPREDNISOLONE SODIUM SUCCINATE 40 MG: 40 INJECTION, POWDER, FOR SOLUTION INTRAMUSCULAR; INTRAVENOUS at 18:26

## 2023-08-31 RX ADMIN — MIRTAZAPINE 3.75 MG: 7.5 TABLET ORAL at 20:29

## 2023-08-31 RX ADMIN — PANTOPRAZOLE SODIUM 40 MG: 40 TABLET, DELAYED RELEASE ORAL at 06:31

## 2023-08-31 RX ADMIN — ENOXAPARIN SODIUM 40 MG: 100 INJECTION SUBCUTANEOUS at 16:26

## 2023-08-31 RX ADMIN — DOXEPIN HYDROCHLORIDE 25 MG: 25 CAPSULE ORAL at 20:29

## 2023-08-31 RX ADMIN — IPRATROPIUM BROMIDE AND ALBUTEROL SULFATE 1 DOSE: 2.5; .5 SOLUTION RESPIRATORY (INHALATION) at 18:54

## 2023-08-31 RX ADMIN — INSULIN GLARGINE 20 UNITS: 100 INJECTION, SOLUTION SUBCUTANEOUS at 21:52

## 2023-08-31 RX ADMIN — INSULIN LISPRO 5 UNITS: 100 INJECTION, SOLUTION INTRAVENOUS; SUBCUTANEOUS at 16:27

## 2023-08-31 RX ADMIN — BUDESONIDE 250 MCG: 0.25 SUSPENSION RESPIRATORY (INHALATION) at 06:41

## 2023-08-31 RX ADMIN — FOLIC ACID 1 MG: 1 TABLET ORAL at 08:36

## 2023-08-31 RX ADMIN — ARFORMOTEROL TARTRATE 15 MCG: 15 SOLUTION RESPIRATORY (INHALATION) at 06:41

## 2023-08-31 RX ADMIN — CHLORDIAZEPOXIDE HYDROCHLORIDE 25 MG: 25 CAPSULE ORAL at 13:47

## 2023-08-31 RX ADMIN — IPRATROPIUM BROMIDE AND ALBUTEROL SULFATE 1 DOSE: 2.5; .5 SOLUTION RESPIRATORY (INHALATION) at 06:55

## 2023-08-31 RX ADMIN — SODIUM CHLORIDE, PRESERVATIVE FREE 10 ML: 5 INJECTION INTRAVENOUS at 08:36

## 2023-08-31 RX ADMIN — CLOPIDOGREL BISULFATE 75 MG: 75 TABLET ORAL at 08:36

## 2023-08-31 RX ADMIN — INSULIN LISPRO 4 UNITS: 100 INJECTION, SOLUTION INTRAVENOUS; SUBCUTANEOUS at 16:27

## 2023-08-31 RX ADMIN — CHLORDIAZEPOXIDE HYDROCHLORIDE 25 MG: 25 CAPSULE ORAL at 16:26

## 2023-08-31 RX ADMIN — SODIUM BICARBONATE 1300 MG: 650 TABLET ORAL at 08:41

## 2023-08-31 RX ADMIN — BUDESONIDE 250 MCG: 0.25 SUSPENSION RESPIRATORY (INHALATION) at 18:45

## 2023-08-31 RX ADMIN — SODIUM BICARBONATE 1300 MG: 650 TABLET ORAL at 20:29

## 2023-08-31 NOTE — PROGRESS NOTES
Limits  Hearing  Hearing: Within functional limits    Cognition   Orientation  Overall Orientation Status: Within Functional Limits  Cognition  Overall Cognitive Status: WFL     Objective   Pulse: 83  Heart Rate Source: Monitor  BP: 123/62  MAP (Calculated): 82  Respirations: 18  SpO2: 98 %  O2 Device: Nasal cannula     Observation/Palpation  Posture: Fair  Observation: O2, polo, IV        AROM RLE (degrees)  RLE AROM: WFL  AROM LLE (degrees)  LLE AROM : WFL  Strength RLE  Strength RLE: Exception  Comment: 3+/5  Strength LLE  Strength LLE: Exception  Comment: 3+/5           Bed mobility  Supine to Sit: Minimal assistance  Sit to Supine: Unable to assess  Scooting: Maximal assistance  Transfers  Sit to Stand: Moderate Assistance;2 Person Assistance  Stand to Sit: Moderate Assistance;2 Person Assistance  Bed to Chair: Moderate assistance;2 Person Assistance  Comment: pt stepped to chair with RW  Ambulation  Surface: Level tile  Device: Rolling Walker  Other Apparatus: O2  Assistance:  Moderate assistance;2 Person assistance  Quality of Gait: slow to initiate steps  Gait Deviations: Slow [place];Decreased step length;Decreased step height  Distance: 2' to chair     Balance  Posture: Fair  Sitting - Static: Fair;+  Sitting - Dynamic: Fair;-  Standing - Static: +;Poor  Standing - Dynamic: Poor           OutComes Score                                                  AM-PAC Score             Tinneti Score       Goals  Short Term Goals  Time Frame for Short Term Goals: 2 wks  Short Term Goal 1: supine to sit indep  Short Term Goal 2: sit to stand indep  Short Term Goal 3: amb. 200' with RW SBA  Short Term Goal 4: bed to chair SBA  Patient Goals   Patient Goals : go home       Education  Patient Education  Education Given To: Patient  Education Provided: Role of Therapy;Plan of Care;Transfer Training;Equipment  Education Provided Comments: use of call light, importance of out of bed to chair, SS  Education Method: Verbal  Barriers to Learning: None  Education Outcome: Verbalized understanding;Demonstrated understanding;Continued education needed      Therapy Time   Individual Concurrent Group Co-treatment   Time In           Time Out           Minutes                   Ish Bailey PT     Electronically signed by Ish Bailey PT on 8/31/2023 at 1:58 PM

## 2023-08-31 NOTE — PROGRESS NOTES
cm/s ; Diameters are measured in mm Pressures +--------------------------------------++--------+-----+----+--------+-----+ ! ! !Right   ! ! Left!        !     ! +--------------------------------------++--------+-----+----+--------+-----+ ! Location                              ! !Pressure! Ratio! !Pressure! Ratio! +--------------------------------------++--------+-----+----+--------+-----+ ! Upper Thigh                           !!151     !1.14 !    !255     !1.92 ! +--------------------------------------++--------+-----+----+--------+-----+ ! Lower Thigh                           !!153     !1.15 !    !161     !1.21 ! +--------------------------------------++--------+-----+----+--------+-----+ ! Calf                                  !!137     !1.03 !    !149     !1.12 ! +--------------------------------------++--------+-----+----+--------+-----+ ! Ankle PT                              !!136     !1.02 !    !136     !1.02 ! +--------------------------------------++--------+-----+----+--------+-----+ ! DPA                                   !!150     !1.13 !    !141     !1.06 ! +--------------------------------------++--------+-----+----+--------+-----+ ! Great Toe                             !!84      !0.63 !    !91      !0.68 ! +--------------------------------------++--------+-----+----+--------+-----+   - Brachial Pressure:Right: 133.   - DERREK:Right: 1.13. Left: 1.06. Plethysmographic Digit Evaluation +---------++--------+-----+---------------++--------+-----+----------------+ ! ! !Right   ! ! Left           !!        !     !                ! +---------++--------+-----+---------------++--------+-----+----------------+ ! Location ! !Pressure! Ratio! PPG Wave Form  ! !Pressure! Ratio! PPG Wave Form   ! +---------++--------+-----+---------------++--------+-----+----------------+ ! Great Toe!!84      !0.63 !               !!91      !0.68 !                ! for further work-up and management    COPD, with acute exacerbation  -Oxygen supplementation to keep SPO2 between 88-92%  - Nebulized Bronchodilators scheduled and on as-needed basis. --> Ipratropium-Albuterol (DuoNeb's) nebulizer every 6 hours scheduled  --> Albuterol nebulizer every 6 hours when necessary for shortness of breath and/or wheezing. Arformoterol Tartrate (BROVANA) 15 mcg  Nebs BID   Budesonide (PULMICORT)  500 mcg  Neb Q12H   - Systemic Steroids    --> Methylprednisolone (Solu-Medrol) 40 mg IV every 8 hours  - IV antibiotics   - Supplemental oxygen for respiratory failure. - Continue to monitor    AGMA - severe. Suspect this to be primary  of his severe dyspnea. At this time primary source of this appears to be florid renal failure. LA elevated though mild. EtOH was negative. Salicylate negative. No DKA. Cont IVF    LES - prerenal injury vs ATN. CT abd pelvis - no signs of  obstruction   Nephrology on board     Folic  Deficiency  Folate<2  Cont PO replacement. Alcohol abuse. Denies Hx of withdrawal.   Reports stopped drinking 3 days ago  Monitor for symptoms of withdrawal.   Stop CIWA. Librium PO scheduled - tolerating well. Hyperglycemia - likely due to steroid. A1C 6.4  Lantus 20BID. Prand bolus 5  ISS  Hypoglycemia precautions. Weaning steroid - monitor BG closely. Continue management of other chronic medical conditions - see above and orders. Advance Directive: Full Code    ADULT ORAL NUTRITION SUPPLEMENT; Breakfast, Lunch, Dinner; Diabetic Oral Supplement  ADULT DIET; Regular; 4 carb choices (60 gm/meal); Low Sodium (2 gm);  No Caffeine         Consults Made:   IP CONSULT TO SOCIAL WORK  IP CONSULT TO NEPHROLOGY      DVT prophylaxis: Enoxaparin    Current medications reviewed  Lab work reviewed  Radiology  films reviewed  Much appreciated treatment recommendations from suspecialities reviewed  Discussed treatment plan with the

## 2023-08-31 NOTE — PROGRESS NOTES
Pt c/o SOB, Shaheen lungs wheezing/crackling, pt appears cyanotic, but O2 remains 99% on 2L per NC. Called RT for breathing tx. Notified Dr. Matheus Martin per perfect serve. Nurse remains at the bedside. 1458: Received order for stat chest XR and ABG    1512: Pt's status updated to kimberley Meza.

## 2023-08-31 NOTE — PROGRESS NOTES
Occupational Therapy  Facility/Department: [facility] PROGRESSIVE CARE  Occupational Therapy Initial Assessment    Name: Lakshmi Taylor  : 89  MRN: [id number]  Date of Service: 2023    Discharge Recommendations:             Patient Diagnosis(es): The primary encounter diagnosis was COPD exacerbation ([address] Diagnoses of Congestive heart failure, unspecified HF chronicity, unspecified heart failure type ([address]), Decreased pulse, and Pain in extremity, unspecified extremity were also pertinent to this visit. Past Medical History:  has a past medical history of Alcohol abuse, Cerebellar stroke, acute ([address]), COPD (chronic obstructive pulmonary disease) ([address]), Degenerative disc disease at L5-S1 level, GERD (gastroesophageal reflux disease), Hyperlipidemia, Hypertension, Iron (Fe) deficiency anemia, Pneumonia, Renal insufficiency, Spinal stenosis, and Ulcer. Past Surgical History:  has a past surgical history that includes hernia repair; Colonoscopy; and Endoscopy, colon, diagnostic. Treatment Diagnosis: COPD exacerbation      Assessment   Performance deficits / Impairments: Decreased functional mobility ; Decreased strength;Decreased balance;Decreased endurance;Decreased ADL status  Assessment: Will progress as tolerated. Will likely need extended therapy before returning home  Treatment Diagnosis: COPD exacerbation  Prognosis: Good  Decision Making: Low Complexity  REQUIRES OT FOLLOW-UP: Yes  Activity Tolerance  Activity Tolerance: Patient Tolerated treatment well        Plan   Occupational Therapy Plan  Times Per Week: 3-5x/week  Times Per Day:  Once a day     Restrictions  Restrictions/Precautions  Restrictions/Precautions: Fall Risk    Subjective   General  Chart Reviewed: Yes  Patient assessed for rehabilitation services?: Yes  Family / Caregiver Present: No  Diagnosis: COPD exacerbation  General Comment  Comments: Pt on 3 liters O2 none at home, chest rattling, nurse present     Social/Functional

## 2023-08-31 NOTE — PROGRESS NOTES
Pt calls out stating \"I can't breath good. \" O2 sats 97-98% on 3L. /89. Pt continues to eat his lunch. PT at bedside and will attempt to get him up to the chair. He is agreeable and hopes it will help him breath a little better.

## 2023-08-31 NOTE — PROGRESS NOTES
Nutrition Assessment     Type and Reason for Visit: Reassess    Nutrition Recommendations/Plan:   Continue current POC. Malnutrition Assessment:  Malnutrition Status: At risk for malnutrition (Comment)    Nutrition Assessment:  Pt improving from a nutritional standpoint with PO intake >50% most meals and wt increase. BG remains elevated in 200s with steroid use. Humalog sliding scale and Lantus 20u BID ordered. Carb Control diet in place with ONS TID as pt had very poor intake prior to admission. Continue current POC and will monitor for changes in status. Estimated Daily Nutrient Needs:  Energy (kcal):  5879-2047 Weight Used for Energy Requirements: Current     Protein (g):   Weight Used for Protein Requirements: Current        Fluid (ml/day):  8141-9681 Method Used for Fluid Requirements: 1 ml/kcal    Nutrition Related Findings:   L foot wound Wound Type: Open Wounds (L foot)    Current Nutrition Therapies:    ADULT ORAL NUTRITION SUPPLEMENT; Breakfast, Lunch, Dinner; Diabetic Oral Supplement  ADULT DIET; Regular; 4 carb choices (60 gm/meal); Low Sodium (2 gm);  No Caffeine    Anthropometric Measures:  Height: 5' 9\" (175.3 cm)  Current Body Wt: 175 lb 6 oz (79.5 kg)   BMI: 25.9    Nutrition Diagnosis:   Inadequate energy intake related to impaired respiratory function, increase demand for energy/nutrients as evidenced by poor intake prior to admission    Nutrition Interventions:   Food and/or Nutrient Delivery: Continue Current Diet, Continue Oral Nutrition Supplement  Nutrition Education/Counseling: No recommendation at this time  Coordination of Nutrition Care: Continue to monitor while inpatient       Goals:  Previous Goal Met: Progressing toward Goal(s)  Goals: PO intake 50% or greater       Nutrition Monitoring and Evaluation:   Behavioral-Environmental Outcomes: None Identified  Food/Nutrient Intake Outcomes: Food and Nutrient Intake, Supplement Intake  Physical Signs/Symptoms Outcomes:

## 2023-08-31 NOTE — PROGRESS NOTES
Jace Beard MD   5 Units at 08/30/23 1728    insulin lispro (HUMALOG) injection vial 0-8 Units  0-8 Units SubCUTAneous TID WC Johnna Hightower MD   4 Units at 08/30/23 1728    insulin lispro (HUMALOG) injection vial 0-4 Units  0-4 Units SubCUTAneous Nightly Johnna Hightower MD        methylPREDNISolone sodium (PF) (SOLU-MEDROL PF) injection 40 mg  40 mg IntraVENous Q12H Johnna Hightower MD   40 mg at 08/31/23 0631    vitamin D (ERGOCALCIFEROL) capsule 50,000 Units  50,000 Units Oral Weekly Johnna Hightower MD   50,000 Units at 08/28/23 0932    ipratropium 0.5 mg-albuterol 2.5 mg (DUONEB) nebulizer solution 1 Dose  1 Dose Inhalation Q4H WA RT Johnna Hightower MD   1 Dose at 08/31/23 0655    glucose chewable tablet 16 g  4 tablet Oral PRN Johnna Hightower MD        dextrose bolus 10% 125 mL  125 mL IntraVENous PRN Johnna Hightower MD        Or    dextrose bolus 10% 250 mL  250 mL IntraVENous PRN Johnna Hightower MD        glucagon injection 1 mg  1 mg SubCUTAneous PRN Johnna Hightower MD        dextrose 10 % infusion   IntraVENous Continuous PRN Johnna Hightower MD        metoprolol tartrate (LOPRESSOR) tablet 6.25 mg  6.25 mg Oral BID Johnna Hightower MD   6.25 mg at 08/31/23 0836    chlordiazePOXIDE (LIBRIUM) capsule 25 mg  25 mg Oral 4x Daily Johnna Hightower MD   25 mg at 08/31/23 0836    aspirin EC tablet 81 mg  81 mg Oral Daily Verline Blazing, APRN - CNP   81 mg at 08/31/23 0835    clopidogrel (PLAVIX) tablet 75 mg  75 mg Oral Daily Verline Blazing, APRN - CNP   75 mg at 08/31/23 0836    doxepin (SINEQUAN) capsule 25 mg  25 mg Oral Nightly Verline Blazing, APRN - CNP   25 mg at 59/72/82 5833    folic acid (FOLVITE) tablet 1 mg  1 mg Oral Daily Verline Blazing, APRN - CNP   1 mg at 08/31/23 0836    melatonin tablet 3 mg  3 mg Oral Nightly Verline Blazing, APRN - CNP   3 mg at 08/30/23 2127    mirtazapine (REMERON) tablet 3.75 mg  3.75 mg Oral Nightly Fran hours. Radiology reports as per the Radiologist  Radiology: CT ABDOMEN PELVIS WO CONTRAST Additional Contrast? None    Result Date: 8/28/2023  EXAM: CT ABDOMEN AND PELVIS WITHOUT CONTRAST  HISTORY: abdominal pain  TECHNIQUE: CT acquisition of the abdomen and pelvis without IV contrast administration. CT dose reduction techniques performed: Yes. Coronal and sagittal reconstructions were performed. COMPARISON: CT abdomen pelvis 08/25/2016  FINDINGS: Lung bases clear. Liver normal.  Hyperdense material in the gallbladder, possibly sludge. Normal spleen, pancreas, adrenal glands. Nonspecific bilateral perinephric stranding. No ureteral obstruction. Normal urinary bladder. No free fluid or free gas. Severe calcific atherosclerosis. Normal stomach and small bowel. No evidence of acute appendicitis. Colonic diverticulosis without focal diverticulitis. Gas in the left anterior abdominal subcutaneous fat, likely from medication injection. No acute findings in the bones. Degenerative changes in the spine and both hips. Impression: Possible gallbladder sludge. Nonspecific bilateral perinephric stranding. No ureteral obstruction. Colonic diverticulosis. All CT scans are performed using dose optimization techniques as appropriate to the performed exam and include at least one of the following: Automated exposure control, adjustment of the mA and/or kV according to size, and the use of iterative reconstruction technique. ______________________________________ Electronically signed by: Pricilla Poon D.O. Date:     08/28/2023 Time:    16:46     XR FOOT LEFT (MIN 3 VIEWS)    Result Date: 8/27/2023  EXAM: LEFT FOOT RADIOGRAPH(S). 3 VIEWS. History: left foot pain  Comparison: none      Findings / Impression: No fracture or dislocation. ______________________________________ Electronically signed by: Pricilla Poon D.O.  Date:     08/27/2023 Time:    14:48     NM LUNG SCAN PERFUSION ONLY    Result Date:

## 2023-08-31 NOTE — PROGRESS NOTES
Physical Therapy  Name: Lakshmi Taylor  MRN:  [id number]  Date of service:  8/31/2023    Pt. drowsy and declined to participate in therapy at this time. Will f/u at a later time.     Electronically signed by Radha Auguste PT on 8/31/2023 at 11:47 AM

## 2023-09-01 LAB
ALBUMIN SERPL-MCNC: 2.9 G/DL (ref 3.5–5.2)
ALP SERPL-CCNC: 54 U/L (ref 40–130)
ALT SERPL-CCNC: 85 U/L (ref 5–41)
ANION GAP SERPL CALCULATED.3IONS-SCNC: 11 MMOL/L (ref 7–19)
AST SERPL-CCNC: 70 U/L (ref 5–40)
BACTERIA BLD CULT ORG #2: NORMAL
BACTERIA BLD CULT: NORMAL
BILIRUB SERPL-MCNC: 0.4 MG/DL (ref 0.2–1.2)
BUN SERPL-MCNC: 36 MG/DL (ref 8–23)
CALCIUM SERPL-MCNC: 8.5 MG/DL (ref 8.8–10.2)
CHLORIDE SERPL-SCNC: 110 MMOL/L (ref 98–111)
CO2 SERPL-SCNC: 20 MMOL/L (ref 22–29)
CREAT SERPL-MCNC: 1.1 MG/DL (ref 0.5–1.2)
ERYTHROCYTE [DISTWIDTH] IN BLOOD BY AUTOMATED COUNT: 16.1 % (ref 11.5–14.5)
GLUCOSE BLD-MCNC: 196 MG/DL (ref 70–99)
GLUCOSE BLD-MCNC: 241 MG/DL (ref 70–99)
GLUCOSE BLD-MCNC: 262 MG/DL (ref 70–99)
GLUCOSE BLD-MCNC: 283 MG/DL (ref 70–99)
GLUCOSE SERPL-MCNC: 193 MG/DL (ref 74–109)
HCT VFR BLD AUTO: 25.9 % (ref 42–52)
HGB BLD-MCNC: 8.2 G/DL (ref 14–18)
MCH RBC QN AUTO: 38.7 PG (ref 27–31)
MCHC RBC AUTO-ENTMCNC: 31.7 G/DL (ref 33–37)
MCV RBC AUTO: 122.2 FL (ref 80–94)
PERFORMED ON: ABNORMAL
PLATELET # BLD AUTO: 108 K/UL (ref 130–400)
PMV BLD AUTO: 12.1 FL (ref 9.4–12.4)
POTASSIUM SERPL-SCNC: 4.8 MMOL/L (ref 3.5–5)
PROT SERPL-MCNC: 5.4 G/DL (ref 6.6–8.7)
RBC # BLD AUTO: 2.12 M/UL (ref 4.7–6.1)
SODIUM SERPL-SCNC: 141 MMOL/L (ref 136–145)
WBC # BLD AUTO: 6.7 K/UL (ref 4.8–10.8)

## 2023-09-01 PROCEDURE — 2580000003 HC RX 258

## 2023-09-01 PROCEDURE — 82962 GLUCOSE BLOOD TEST: CPT

## 2023-09-01 PROCEDURE — 2140000000 HC CCU INTERMEDIATE R&B

## 2023-09-01 PROCEDURE — 94760 N-INVAS EAR/PLS OXIMETRY 1: CPT

## 2023-09-01 PROCEDURE — 2700000000 HC OXYGEN THERAPY PER DAY

## 2023-09-01 PROCEDURE — 6360000002 HC RX W HCPCS

## 2023-09-01 PROCEDURE — 6360000002 HC RX W HCPCS: Performed by: INTERNAL MEDICINE

## 2023-09-01 PROCEDURE — 6370000000 HC RX 637 (ALT 250 FOR IP): Performed by: INTERNAL MEDICINE

## 2023-09-01 PROCEDURE — 99222 1ST HOSP IP/OBS MODERATE 55: CPT | Performed by: SPECIALIST

## 2023-09-01 PROCEDURE — 36415 COLL VENOUS BLD VENIPUNCTURE: CPT

## 2023-09-01 PROCEDURE — 85027 COMPLETE CBC AUTOMATED: CPT

## 2023-09-01 PROCEDURE — 80053 COMPREHEN METABOLIC PANEL: CPT

## 2023-09-01 PROCEDURE — 6370000000 HC RX 637 (ALT 250 FOR IP)

## 2023-09-01 PROCEDURE — 93005 ELECTROCARDIOGRAM TRACING: CPT

## 2023-09-01 PROCEDURE — 94640 AIRWAY INHALATION TREATMENT: CPT

## 2023-09-01 RX ORDER — ERGOCALCIFEROL 1.25 MG/1
50000 CAPSULE ORAL WEEKLY
Qty: 5 CAPSULE | Refills: 0 | Status: SHIPPED | OUTPATIENT
Start: 2023-09-04 | End: 2023-10-03

## 2023-09-01 RX ORDER — NICOTINE 21 MG/24HR
1 PATCH, TRANSDERMAL 24 HOURS TRANSDERMAL DAILY
Qty: 30 PATCH | Refills: 1 | Status: SHIPPED | OUTPATIENT
Start: 2023-09-02

## 2023-09-01 RX ORDER — METHYLPREDNISOLONE 4 MG/1
TABLET ORAL
Qty: 1 KIT | Refills: 0 | Status: SHIPPED | OUTPATIENT
Start: 2023-09-01 | End: 2023-09-08 | Stop reason: HOSPADM

## 2023-09-01 RX ADMIN — SODIUM CHLORIDE, PRESERVATIVE FREE 10 ML: 5 INJECTION INTRAVENOUS at 08:41

## 2023-09-01 RX ADMIN — INSULIN GLARGINE 20 UNITS: 100 INJECTION, SOLUTION SUBCUTANEOUS at 08:39

## 2023-09-01 RX ADMIN — SODIUM BICARBONATE 1300 MG: 650 TABLET ORAL at 12:51

## 2023-09-01 RX ADMIN — IPRATROPIUM BROMIDE AND ALBUTEROL SULFATE 1 DOSE: 2.5; .5 SOLUTION RESPIRATORY (INHALATION) at 07:03

## 2023-09-01 RX ADMIN — CLOPIDOGREL BISULFATE 75 MG: 75 TABLET ORAL at 08:39

## 2023-09-01 RX ADMIN — BUDESONIDE 250 MCG: 0.25 SUSPENSION RESPIRATORY (INHALATION) at 19:04

## 2023-09-01 RX ADMIN — METHYLPREDNISOLONE SODIUM SUCCINATE 40 MG: 40 INJECTION, POWDER, FOR SOLUTION INTRAMUSCULAR; INTRAVENOUS at 05:21

## 2023-09-01 RX ADMIN — METHYLPREDNISOLONE SODIUM SUCCINATE 40 MG: 40 INJECTION, POWDER, FOR SOLUTION INTRAMUSCULAR; INTRAVENOUS at 16:50

## 2023-09-01 RX ADMIN — INSULIN GLARGINE 20 UNITS: 100 INJECTION, SOLUTION SUBCUTANEOUS at 20:31

## 2023-09-01 RX ADMIN — ARFORMOTEROL TARTRATE 15 MCG: 15 SOLUTION RESPIRATORY (INHALATION) at 07:08

## 2023-09-01 RX ADMIN — Medication 3 MG: at 20:33

## 2023-09-01 RX ADMIN — IPRATROPIUM BROMIDE AND ALBUTEROL SULFATE 1 DOSE: 2.5; .5 SOLUTION RESPIRATORY (INHALATION) at 10:38

## 2023-09-01 RX ADMIN — CHLORDIAZEPOXIDE HYDROCHLORIDE 25 MG: 25 CAPSULE ORAL at 16:50

## 2023-09-01 RX ADMIN — INSULIN LISPRO 5 UNITS: 100 INJECTION, SOLUTION INTRAVENOUS; SUBCUTANEOUS at 12:51

## 2023-09-01 RX ADMIN — CHLORDIAZEPOXIDE HYDROCHLORIDE 25 MG: 25 CAPSULE ORAL at 08:38

## 2023-09-01 RX ADMIN — BUDESONIDE 250 MCG: 0.25 SUSPENSION RESPIRATORY (INHALATION) at 07:08

## 2023-09-01 RX ADMIN — INSULIN LISPRO 5 UNITS: 100 INJECTION, SOLUTION INTRAVENOUS; SUBCUTANEOUS at 16:51

## 2023-09-01 RX ADMIN — IPRATROPIUM BROMIDE AND ALBUTEROL SULFATE 1 DOSE: 2.5; .5 SOLUTION RESPIRATORY (INHALATION) at 14:37

## 2023-09-01 RX ADMIN — ASPIRIN 81 MG: 81 TABLET, COATED ORAL at 08:38

## 2023-09-01 RX ADMIN — CHLORDIAZEPOXIDE HYDROCHLORIDE 25 MG: 25 CAPSULE ORAL at 20:33

## 2023-09-01 RX ADMIN — METOPROLOL TARTRATE 6.25 MG: 25 TABLET, FILM COATED ORAL at 20:33

## 2023-09-01 RX ADMIN — CHLORDIAZEPOXIDE HYDROCHLORIDE 25 MG: 25 CAPSULE ORAL at 12:51

## 2023-09-01 RX ADMIN — INSULIN LISPRO 4 UNITS: 100 INJECTION, SOLUTION INTRAVENOUS; SUBCUTANEOUS at 12:51

## 2023-09-01 RX ADMIN — FOLIC ACID 1 MG: 1 TABLET ORAL at 08:39

## 2023-09-01 RX ADMIN — ENOXAPARIN SODIUM 40 MG: 100 INJECTION SUBCUTANEOUS at 16:50

## 2023-09-01 RX ADMIN — METOPROLOL TARTRATE 6.25 MG: 25 TABLET, FILM COATED ORAL at 08:38

## 2023-09-01 RX ADMIN — ARFORMOTEROL TARTRATE 15 MCG: 15 SOLUTION RESPIRATORY (INHALATION) at 19:04

## 2023-09-01 RX ADMIN — SODIUM BICARBONATE 1300 MG: 650 TABLET ORAL at 20:33

## 2023-09-01 RX ADMIN — PANTOPRAZOLE SODIUM 40 MG: 40 TABLET, DELAYED RELEASE ORAL at 05:21

## 2023-09-01 RX ADMIN — DOXEPIN HYDROCHLORIDE 25 MG: 25 CAPSULE ORAL at 20:33

## 2023-09-01 RX ADMIN — Medication 100 MG: at 08:39

## 2023-09-01 RX ADMIN — SODIUM BICARBONATE 1300 MG: 650 TABLET ORAL at 08:38

## 2023-09-01 RX ADMIN — MIRTAZAPINE 3.75 MG: 7.5 TABLET ORAL at 20:33

## 2023-09-01 RX ADMIN — IPRATROPIUM BROMIDE AND ALBUTEROL SULFATE 1 DOSE: 2.5; .5 SOLUTION RESPIRATORY (INHALATION) at 19:04

## 2023-09-01 RX ADMIN — INSULIN LISPRO 4 UNITS: 100 INJECTION, SOLUTION INTRAVENOUS; SUBCUTANEOUS at 16:50

## 2023-09-01 NOTE — CARE COORDINATION
Trang spoke to admission coordinator at [facility] wanting to know if Pt will d/c today. Trang has messaged dr. Evaristo Boo to see about d/c summary. Electronically signed by SHANDRA Clemente on 9/1/2023 at 12:05 PM    TRANG spoke to Dr. Claribel Jennings MD who has approved Pt to d/c to [facility] today. Trang has notified Raj Lao to let her know Pt will d/c today.    Electronically signed by SHANDRA Clemente on 9/1/2023 at 12:22 PM

## 2023-09-01 NOTE — PROGRESS NOTES
Physical Therapy  Name: Gia Verma  MRN:  [id number]  Date of service:  9/1/2023 09/01/23 1416   Subjective   Subjective Attempt with patient laying in bed stating her just feels too weak and tired right bethel and declines mobility at this time. Will continue to follow.          Electronically signed by Loretta Metzger PTA on 9/1/2023 at 2:17 PM

## 2023-09-01 NOTE — PROGRESS NOTES
CNP   25 mg at 38/43/21 8942    folic acid (FOLVITE) tablet 1 mg  1 mg Oral Daily Mary Gratz, APRN - CNP   1 mg at 09/01/23 5212    melatonin tablet 3 mg  3 mg Oral Nightly Mary Gratz, APRN - CNP   3 mg at 08/31/23 2029    mirtazapine (REMERON) tablet 3.75 mg  3.75 mg Oral Nightly Mary Gratz, APRN - CNP   3.75 mg at 08/31/23 2029    pantoprazole (PROTONIX) tablet 40 mg  40 mg Oral QAM AC Mary Gratz, APRN - CNP   40 mg at 09/01/23 0521    sodium chloride flush 0.9 % injection 5-40 mL  5-40 mL IntraVENous 2 times per day Mary Gratz, APRN - CNP   10 mL at 09/01/23 0841    sodium chloride flush 0.9 % injection 5-40 mL  5-40 mL IntraVENous PRN Mary Gratz, APRN - CNP        0.9 % sodium chloride infusion   IntraVENous PRN Mary Gratz, APRN - CNP        ondansetron (ZOFRAN-ODT) disintegrating tablet 4 mg  4 mg Oral Q8H PRN Mary Gratz, APRN - CNP        Or    ondansetron [facility]) injection 4 mg  4 mg IntraVENous Q6H PRN Mary Gratz, APRN - CNP        polyethylene glycol (GLYCOLAX) packet 17 g  17 g Oral Daily PRN Mary Gratz, APRN - CNP        acetaminophen (TYLENOL) tablet 650 mg  650 mg Oral Q6H PRN Mary Gratz, APRN - CNP   650 mg at 08/28/23 9573    Or    acetaminophen (TYLENOL) suppository 650 mg  650 mg Rectal Q6H PRN Mary Gratz, APRN - CNP        0.9 % sodium chloride infusion   IntraVENous Continuous Polina Sebastian MD 30 mL/hr at 08/31/23 1021 Rate Change at 08/31/23 1021    arformoterol tartrate (BROVANA) nebulizer solution 15 mcg  15 mcg Nebulization BID RT Mary Shaq, APRN - CNP   15 mcg at 09/01/23 0708    budesonide (PULMICORT) nebulizer suspension 250 mcg  0.25 mg Nebulization BID RT Mary Shaq, APRN - CNP   250 mcg at 09/01/23 0979    thiamine mononitrate tablet 100 mg  100 mg Oral Daily KRYSTIAN De Guzman CNP   100 mg at 09/01/23 0839    nicotine (NICODERM CQ) 14 MG/24HR 1 patch  1 patch TransDERmal Daily KRYSTIAN De Guzman CNP   1 patch at 09/01/23

## 2023-09-01 NOTE — PROGRESS NOTES
Occupational Therapy  Pt in bed upon arrival for therapy. Pt states he is not feeling well today and refuses therapy this pm. Will continue to follow.  Electronically signed by MYRANDA Tinajero on 9/1/2023 at 5:27 PM

## 2023-09-01 NOTE — PROGRESS NOTES
HISTORY: Shortness of breath, elevated D-dimer  COMPARISONS: Chest radiograph from same day. VQ scan 06/14/2022  TECHNIQUE:  5.0 mCi macroaggregated albumin utilized for perfusion. FINDINGS: Examination is limited by perfusion only exam.  No moderately large segmental perfusion defect is seen. Small non-segmental perfusion defects are suggested. Low probability for pulmonary embolism. ______________________________________ Electronically signed by: Ra Byrd M.D. Date:     08/27/2023 Time:    16:26     XR CHEST PORTABLE    Result Date: 8/27/2023  EXAM: FRONTAL CHEST RADIOGRAPH(S).  1 VIEW. History: Shortness of breath  Comparison: 08/24/2023      Findings / Impression: Stable cardiomegaly. Patel Portillomel No pleural effusion or pneumothorax. No focal airspace opacification. ______________________________________ Electronically signed by: Laura Rinne D.O. Date:     08/27/2023 Time:    14:40        Assessment   1. Acute kidney injury/improved  2. Intravascular volume depletion. 3.  Metabolic acidemia. 4.  Hypokalemia. 5.  COPD exacerbation. 6.  History of alcohol abuse. 7.  Stage II chronic kidney disease baseline. 8.  Type II diabetic nephropathy. Plan:  1.  P.o. sodium bicarbonate. 2.  Potassium supplement as needed.     Inez Miner MD  09/01/23  11:07 AM

## 2023-09-01 NOTE — PROGRESS NOTES
Received call from Dr. Vikas Freeman, noted pt had abnormal CT abd on 8/28, will consult GI for further work up. DC cancelled.

## 2023-09-01 NOTE — CONSULTS
the management of this patient. Electronically signed by Fede Grigsby MD on 9/1/23 at 4:42 PM CDT    I sent the following communication to [facility] GI clinic:    Hi Ms. Radha Naranjo,      Please schedule follow-up in the [facility]  clinic in 2-4 weeks to follow-up on GERD, consider EGD to rule out Guzman's esophagus and outpatient screening colonoscopy. Also needs work-up of elevated AST and ALT. Has elevated ferritin. Thank you. Fede Grigsby MD    I am a locum tenens patient. I do not have an office practice. Therefore I will not be able to follow-up on this patient as outpatient basis. Locum tenens possibilities    To whom it may concern. I am a locum tenens physician. Therefore,  I do not have an office practice. I will not be able to follow-up on any patient as an outpatient basis. I will not be able to follow-up on the results of pending labs, pathology reports,  imaging studies. I will not be able to or do any outpatient procedures. I will not be able to provide continuity of care of any kind to any patient . I  will not be able to keep in touch with any patient by any means such as phone, text or by e-mail because of the nature of my responsibilities as a locum tenens physician. When the patient is discharged, my patient physician relationship is over. The patient will need to follow-up with other physicians in the hospital when I am not on-call. After discharge, patient will need to follow-up with their primary care doctor, gastroenterologist or liver doctor for continuity of care, check on pending labs, check on pending pathology and check on results of the pending imaging studies-such as x-rays, ultrasound, CAT scan, MRI, HIDA scan, gastric emptying scan or any other studies. For ER physicians, hospitalists and other physicians involved in patient care:  If a patient needs follow-up GI care, please schedule follow-up with patient's PCP, gastroenterologist or hepatologist on their health plan. Thank you.     Luis Reid MD

## 2023-09-01 NOTE — PLAN OF CARE
Problem: Discharge Planning  Goal: Discharge to home or other facility with appropriate resources  Outcome: Progressing     Problem: Skin/Tissue Integrity  Goal: Absence of new skin breakdown  Description: 1. Monitor for areas of redness and/or skin breakdown  2. Assess vascular access sites hourly  3. Every 4-6 hours minimum:  Change oxygen saturation probe site  4. Every 4-6 hours:  If on nasal continuous positive airway pressure, respiratory therapy assess nares and determine need for appliance change or resting period.   Outcome: Progressing     Problem: Safety - Adult  Goal: Free from fall injury  Outcome: Progressing     Problem: Nutrition Deficit:  Goal: Optimize nutritional status  Outcome: Progressing     Problem: Chronic Conditions and Co-morbidities  Goal: Patient's chronic conditions and co-morbidity symptoms are monitored and maintained or improved  Outcome: Progressing

## 2023-09-02 LAB
ALBUMIN SERPL-MCNC: 2.9 G/DL (ref 3.5–5.2)
ALP SERPL-CCNC: 45 U/L (ref 40–130)
ALT SERPL-CCNC: 97 U/L (ref 5–41)
ANION GAP SERPL CALCULATED.3IONS-SCNC: 8 MMOL/L (ref 7–19)
AST SERPL-CCNC: 63 U/L (ref 5–40)
BILIRUB SERPL-MCNC: 0.3 MG/DL (ref 0.2–1.2)
BUN SERPL-MCNC: 36 MG/DL (ref 8–23)
CALCIUM SERPL-MCNC: 8.1 MG/DL (ref 8.8–10.2)
CHLORIDE SERPL-SCNC: 108 MMOL/L (ref 98–111)
CO2 SERPL-SCNC: 24 MMOL/L (ref 22–29)
CREAT SERPL-MCNC: 1.2 MG/DL (ref 0.5–1.2)
EKG P AXIS: 41 DEGREES
EKG P-R INTERVAL: 146 MS
EKG Q-T INTERVAL: 396 MS
EKG QRS DURATION: 78 MS
EKG QTC CALCULATION (BAZETT): 432 MS
EKG T AXIS: 6 DEGREES
ERYTHROCYTE [DISTWIDTH] IN BLOOD BY AUTOMATED COUNT: 16.1 % (ref 11.5–14.5)
GLUCOSE BLD-MCNC: 112 MG/DL (ref 70–99)
GLUCOSE BLD-MCNC: 144 MG/DL (ref 70–99)
GLUCOSE BLD-MCNC: 178 MG/DL (ref 70–99)
GLUCOSE BLD-MCNC: 266 MG/DL (ref 70–99)
GLUCOSE SERPL-MCNC: 155 MG/DL (ref 74–109)
HCT VFR BLD AUTO: 22.3 % (ref 42–52)
HGB BLD-MCNC: 7.1 G/DL (ref 14–18)
MCH RBC QN AUTO: 39 PG (ref 27–31)
MCHC RBC AUTO-ENTMCNC: 31.8 G/DL (ref 33–37)
MCV RBC AUTO: 122.5 FL (ref 80–94)
PERFORMED ON: ABNORMAL
PLATELET # BLD AUTO: 114 K/UL (ref 130–400)
PMV BLD AUTO: 11.9 FL (ref 9.4–12.4)
POTASSIUM SERPL-SCNC: 4.9 MMOL/L (ref 3.5–5)
PROT SERPL-MCNC: 4.5 G/DL (ref 6.6–8.7)
RBC # BLD AUTO: 1.82 M/UL (ref 4.7–6.1)
SODIUM SERPL-SCNC: 140 MMOL/L (ref 136–145)
WBC # BLD AUTO: 6.6 K/UL (ref 4.8–10.8)

## 2023-09-02 PROCEDURE — 80053 COMPREHEN METABOLIC PANEL: CPT

## 2023-09-02 PROCEDURE — 2700000000 HC OXYGEN THERAPY PER DAY

## 2023-09-02 PROCEDURE — 2580000003 HC RX 258

## 2023-09-02 PROCEDURE — 6370000000 HC RX 637 (ALT 250 FOR IP): Performed by: INTERNAL MEDICINE

## 2023-09-02 PROCEDURE — 36415 COLL VENOUS BLD VENIPUNCTURE: CPT

## 2023-09-02 PROCEDURE — 6360000002 HC RX W HCPCS

## 2023-09-02 PROCEDURE — 94760 N-INVAS EAR/PLS OXIMETRY 1: CPT

## 2023-09-02 PROCEDURE — 2140000000 HC CCU INTERMEDIATE R&B

## 2023-09-02 PROCEDURE — 82962 GLUCOSE BLOOD TEST: CPT

## 2023-09-02 PROCEDURE — 85027 COMPLETE CBC AUTOMATED: CPT

## 2023-09-02 PROCEDURE — 94640 AIRWAY INHALATION TREATMENT: CPT

## 2023-09-02 PROCEDURE — 6370000000 HC RX 637 (ALT 250 FOR IP)

## 2023-09-02 PROCEDURE — 6360000002 HC RX W HCPCS: Performed by: INTERNAL MEDICINE

## 2023-09-02 RX ORDER — CHLORDIAZEPOXIDE HYDROCHLORIDE 25 MG/1
25 CAPSULE, GELATIN COATED ORAL 2 TIMES DAILY
Status: COMPLETED | OUTPATIENT
Start: 2023-09-02 | End: 2023-09-04

## 2023-09-02 RX ADMIN — IPRATROPIUM BROMIDE AND ALBUTEROL SULFATE 1 DOSE: 2.5; .5 SOLUTION RESPIRATORY (INHALATION) at 10:28

## 2023-09-02 RX ADMIN — SODIUM CHLORIDE, PRESERVATIVE FREE 10 ML: 5 INJECTION INTRAVENOUS at 21:38

## 2023-09-02 RX ADMIN — CHLORDIAZEPOXIDE HYDROCHLORIDE 25 MG: 25 CAPSULE ORAL at 21:37

## 2023-09-02 RX ADMIN — SODIUM CHLORIDE, PRESERVATIVE FREE 10 ML: 5 INJECTION INTRAVENOUS at 08:55

## 2023-09-02 RX ADMIN — DOXEPIN HYDROCHLORIDE 25 MG: 25 CAPSULE ORAL at 21:37

## 2023-09-02 RX ADMIN — METHYLPREDNISOLONE SODIUM SUCCINATE 40 MG: 40 INJECTION, POWDER, FOR SOLUTION INTRAMUSCULAR; INTRAVENOUS at 06:32

## 2023-09-02 RX ADMIN — ASPIRIN 81 MG: 81 TABLET, COATED ORAL at 08:47

## 2023-09-02 RX ADMIN — Medication 3 MG: at 21:37

## 2023-09-02 RX ADMIN — PANTOPRAZOLE SODIUM 40 MG: 40 TABLET, DELAYED RELEASE ORAL at 06:32

## 2023-09-02 RX ADMIN — FOLIC ACID 1 MG: 1 TABLET ORAL at 08:47

## 2023-09-02 RX ADMIN — IPRATROPIUM BROMIDE AND ALBUTEROL SULFATE 1 DOSE: 2.5; .5 SOLUTION RESPIRATORY (INHALATION) at 15:05

## 2023-09-02 RX ADMIN — METHYLPREDNISOLONE SODIUM SUCCINATE 40 MG: 40 INJECTION, POWDER, FOR SOLUTION INTRAMUSCULAR; INTRAVENOUS at 17:32

## 2023-09-02 RX ADMIN — SODIUM BICARBONATE 1300 MG: 650 TABLET ORAL at 21:37

## 2023-09-02 RX ADMIN — INSULIN LISPRO 4 UNITS: 100 INJECTION, SOLUTION INTRAVENOUS; SUBCUTANEOUS at 17:46

## 2023-09-02 RX ADMIN — SODIUM BICARBONATE 1300 MG: 650 TABLET ORAL at 13:04

## 2023-09-02 RX ADMIN — ARFORMOTEROL TARTRATE 15 MCG: 15 SOLUTION RESPIRATORY (INHALATION) at 06:26

## 2023-09-02 RX ADMIN — BUDESONIDE 250 MCG: 0.25 SUSPENSION RESPIRATORY (INHALATION) at 18:34

## 2023-09-02 RX ADMIN — IPRATROPIUM BROMIDE AND ALBUTEROL SULFATE 1 DOSE: 2.5; .5 SOLUTION RESPIRATORY (INHALATION) at 06:18

## 2023-09-02 RX ADMIN — INSULIN GLARGINE 20 UNITS: 100 INJECTION, SOLUTION SUBCUTANEOUS at 08:51

## 2023-09-02 RX ADMIN — CHLORDIAZEPOXIDE HYDROCHLORIDE 25 MG: 25 CAPSULE ORAL at 08:47

## 2023-09-02 RX ADMIN — IPRATROPIUM BROMIDE AND ALBUTEROL SULFATE 1 DOSE: 2.5; .5 SOLUTION RESPIRATORY (INHALATION) at 18:34

## 2023-09-02 RX ADMIN — INSULIN GLARGINE 20 UNITS: 100 INJECTION, SOLUTION SUBCUTANEOUS at 21:37

## 2023-09-02 RX ADMIN — ENOXAPARIN SODIUM 40 MG: 100 INJECTION SUBCUTANEOUS at 17:32

## 2023-09-02 RX ADMIN — SODIUM BICARBONATE 1300 MG: 650 TABLET ORAL at 08:47

## 2023-09-02 RX ADMIN — METOPROLOL TARTRATE 6.25 MG: 25 TABLET, FILM COATED ORAL at 08:47

## 2023-09-02 RX ADMIN — BUDESONIDE 250 MCG: 0.25 SUSPENSION RESPIRATORY (INHALATION) at 06:26

## 2023-09-02 RX ADMIN — ARFORMOTEROL TARTRATE 15 MCG: 15 SOLUTION RESPIRATORY (INHALATION) at 18:34

## 2023-09-02 RX ADMIN — Medication 100 MG: at 08:47

## 2023-09-02 RX ADMIN — METOPROLOL TARTRATE 6.25 MG: 25 TABLET, FILM COATED ORAL at 21:37

## 2023-09-02 RX ADMIN — CLOPIDOGREL BISULFATE 75 MG: 75 TABLET ORAL at 08:47

## 2023-09-02 RX ADMIN — MIRTAZAPINE 3.75 MG: 7.5 TABLET ORAL at 21:37

## 2023-09-02 NOTE — ACP (ADVANCE CARE PLANNING)
Advance Care Planning     Advance Care Planning Inpatient Note  [facility] Care Department    Today's Date: 9/1/2023  Unit: [address] Decision Makers:       Primary Decision Maker: Onel Bashir [facility] - [phone]       Electronically signed by Zoe Lazo on 9/1/2023 at 8:34 PM

## 2023-09-02 NOTE — DISCHARGE SUMMARY
Date/Time    TSH 2.880 06/14/2022 09:11 AM     Troponin T: No results for input(s): TROPONINI in the last 72 hours. Pro-BNP: No results for input(s): BNP in the last 72 hours. INR: No results for input(s): INR in the last 72 hours. ABGs:   Lab Results   Component Value Date/Time    PHART 7.450 08/31/2023 03:17 PM    PO2ART 110.0 08/31/2023 03:17 PM    MFM9WEO 29.0 08/31/2023 03:17 PM     UA:No results for input(s): NITRITE, COLORU, PHUR, LABCAST, WBCUA, RBCUA, MUCUS, TRICHOMONAS, YEAST, BACTERIA, CLARITYU, SPECGRAV, LEUKOCYTESUR, UROBILINOGEN, BILIRUBINUR, BLOODU, GLUCOSEU, AMORPHOUS in the last 72 hours. Invalid input(s): Beverly Clarissa      Culture Results:    No results for input(s): CXSURG in the last 720 hours. Blood Culture Recent:   Recent Labs     08/27/23  1235   BC No growth after 5 days of incubation. Cultures:   No results for input(s): CULTURE in the last 72 hours. No results for input(s): BC, Judy Booty in the last 72 hours. No results for input(s): CXSURG in the last 72 hours. Recent Labs     08/31/23  0128 08/31/23  0620   PHOS 2.5 3.4       Recent Labs     08/31/23  0620 09/01/23  0710 09/02/23  0224   AST 49* 70* 63*   ALT 39 85* 97*   BILITOT 0.4 0.4 0.3   ALKPHOS 50 54 45             Significant Diagnostic Studies:   CT ABDOMEN PELVIS WO CONTRAST Additional Contrast? None    Result Date: 8/28/2023  EXAM: CT ABDOMEN AND PELVIS WITHOUT CONTRAST  HISTORY: abdominal pain  TECHNIQUE: CT acquisition of the abdomen and pelvis without IV contrast administration. CT dose reduction techniques performed: Yes. Coronal and sagittal reconstructions were performed. COMPARISON: CT abdomen pelvis 08/25/2016  FINDINGS: Lung bases clear. Liver normal.  Hyperdense material in the gallbladder, possibly sludge. Normal spleen, pancreas, adrenal glands. Nonspecific bilateral perinephric stranding. No ureteral obstruction. Normal urinary bladder. No free fluid or free gas.   Severe calcific Phone: [phone]  Relation: Child         Time Spent on discharge is   36 mins  in the examination, evaluation, counseling and review of medications and discharge plan. Electronically signed by   Shona Mortensen MD,   Internal Medicine Hospitalist   9/2/2023 5:11 PM      Thank you No primary care provider on file. for the opportunity to be involved in this patient's care. If you have any questions or concerns please feel free to contact me at [phone]        EMR Dragon/Transcription disclaimer:   Much of this encounter note is an electronic transcription/translation of spoken language to printed text.  The electronic translation of spoken language may permit erroneous, or at times, nonsensical words or phrases to be inadvertently transcribed; although attempts have made to review the note for such errors, some may still exist.

## 2023-09-02 NOTE — PROGRESS NOTES
09/01/23 2025   Encounter Summary   Encounter Overview/Reason  Advance Care Planning   Service Provided For: Patient   Support System Family members   Complexity of Encounter Moderate   Begin Time 1830   End Time  1845   Total Time Calculated 15 min   Spiritual/Emotional needs   Type Spiritual Support   Advance Care Planning   Type ACP conversation   Assessment/Intervention/Outcome   Assessment Coping   Intervention Explored/Affirmed feelings, thoughts, concerns   Outcome Expressed feelings, needs, and concerns   Plan and Referrals   Plan/Referrals Continue to visit, (comment)      assisted the patient and talked with his son Daisy Sands in [place], patient named Daisy Sands as HCDM. Patient stated that he want to be full code. ACP Notes completed. Updated pt's nurse.     Electronically signed by Dwayne Gonzalez on 9/1/2023 at 8:34 PM

## 2023-09-02 NOTE — PROGRESS NOTES
Nephrology ([facility]) Progress Note      Patient:  Rex Ellsworth  YOB: 1955  Date of Service: 9/2/2023  MRN: [id number]   Acct: [de-identified]   Primary Care Physician: No primary care provider on file. Advance Directive: Full Code  Admit Date: 8/27/2023       Hospital Day: 6  Referring Provider: Ramiro Sung MD    Patient independently seen and examined, Chart, Consults, Notes, Operative notes, Labs, Cardiology, and Radiology studies reviewed as available. Chief complaint: Abnormal labs. Subjective:  Rex Ellsworth is a 79 y.o. male for whom we were consulted for evaluation and treatment of acute kidney injury/chronic kidney disease. Patient has chronic kidney disease/unknown stage, follows with nephrology in [place]. He also has history of alcohol abuse, CVA, COPD, gastroesophageal reflux disease, hyperlipidemia, hypertension and chronic smoker. He presented to emergency room with increasing shortness of breath associated with chest tightness. He also reported generalized fatigue, lack of energy and tiredness. He continues to smoke cigarette and drink alcohol almost every day. He is now admitted for treatment of COPD exacerbation. His serum creatinine on admission was 1.8 mg. Hospital course remarkable for IV fluid administration and has significant improvement of renal function. This morning patient feels well. His renal function continues to improve.   However he still has shortness of breath due to COPD    Allergies:  Influenza virus vaccine and Niacin and related    Medicines:  Current Facility-Administered Medications   Medication Dose Route Frequency Provider Last Rate Last Admin    sodium bicarbonate tablet 1,300 mg  1,300 mg Oral TID Umer Lutz MD   1,300 mg at 09/02/23 0847    insulin glargine (LANTUS) injection vial 20 Units  20 Units SubCUTAneous BID Phong Leslie MD   20 Units at 09/02/23 0851    insulin lispro (HUMALOG) acquisition of the abdomen and pelvis without IV contrast administration. CT dose reduction techniques performed: Yes. Coronal and sagittal reconstructions were performed. COMPARISON: CT abdomen pelvis 08/25/2016  FINDINGS: Lung bases clear. Liver normal.  Hyperdense material in the gallbladder, possibly sludge. Normal spleen, pancreas, adrenal glands. Nonspecific bilateral perinephric stranding. No ureteral obstruction. Normal urinary bladder. No free fluid or free gas. Severe calcific atherosclerosis. Normal stomach and small bowel. No evidence of acute appendicitis. Colonic diverticulosis without focal diverticulitis. Gas in the left anterior abdominal subcutaneous fat, likely from medication injection. No acute findings in the bones. Degenerative changes in the spine and both hips. Impression: Possible gallbladder sludge. Nonspecific bilateral perinephric stranding. No ureteral obstruction. Colonic diverticulosis. All CT scans are performed using dose optimization techniques as appropriate to the performed exam and include at least one of the following: Automated exposure control, adjustment of the mA and/or kV according to size, and the use of iterative reconstruction technique. ______________________________________ Electronically signed by: Elena Arias D.O. Date:     08/28/2023 Time:    16:46     XR FOOT LEFT (MIN 3 VIEWS)    Result Date: 8/27/2023  EXAM: LEFT FOOT RADIOGRAPH(S). 3 VIEWS. History: left foot pain  Comparison: none      Findings / Impression: No fracture or dislocation. ______________________________________ Electronically signed by: Elena Arias D.O. Date:     08/27/2023 Time:    14:48     NM LUNG SCAN PERFUSION ONLY    Result Date: 8/27/2023  EXAM: LUNG PERFUSION SCAN. HISTORY: Shortness of breath, elevated D-dimer  COMPARISONS: Chest radiograph from same day. VQ scan 06/14/2022  TECHNIQUE:  5.0 mCi macroaggregated albumin utilized for perfusion.

## 2023-09-02 NOTE — PLAN OF CARE
Problem: Discharge Planning  Goal: Discharge to home or other facility with appropriate resources  Outcome: Not Progressing     Problem: Safety - Adult  Goal: Free from fall injury  Outcome: Progressing     Problem: Nutrition Deficit:  Goal: Optimize nutritional status  Outcome: Progressing     Problem: Chronic Conditions and Co-morbidities  Goal: Patient's chronic conditions and co-morbidity symptoms are monitored and maintained or improved  Outcome: Not Progressing     Problem: Discharge Planning  Goal: Discharge to home or other facility with appropriate resources  Outcome: Not Progressing     Problem: Chronic Conditions and Co-morbidities  Goal: Patient's chronic conditions and co-morbidity symptoms are monitored and maintained or improved  Outcome: Not Progressing

## 2023-09-02 NOTE — PROGRESS NOTES
oropharyngeal erythema. Eyes:      General: No scleral icterus. Right eye: No discharge. Left eye: No discharge. Extraocular Movements: Extraocular movements intact. Conjunctiva/sclera: Conjunctivae normal.      Pupils: Pupils are equal, round, and reactive to light. Cardiovascular:      Rate and Rhythm: Normal rate and regular rhythm. Pulses: Normal pulses. Heart sounds: Normal heart sounds. No murmur heard. No friction rub. No gallop. Pulmonary:      Effort: Pulmonary effort is normal. No respiratory distress. Breath sounds: Normal breath sounds. No stridor. No wheezing, rhonchi or rales. Chest:      Chest wall: No tenderness. Abdominal:      General: Bowel sounds are normal. There is no distension. Palpations: Abdomen is soft. Tenderness: There is no abdominal tenderness. There is no guarding or rebound. Musculoskeletal:         General: No swelling, tenderness, deformity or signs of injury. Normal range of motion. Cervical back: Normal range of motion and neck supple. No rigidity. No muscular tenderness. Right lower leg: No edema. Left lower leg: No edema. Skin:     General: Skin is warm and dry. Capillary Refill: Capillary refill takes less than 2 seconds. Coloration: Skin is not jaundiced or pale. Findings: No bruising, erythema, lesion or rash. Neurological:      General: No focal deficit present. Mental Status: He is alert and oriented to person, place, and time. Cranial Nerves: No cranial nerve deficit. Sensory: No sensory deficit. Motor: No weakness. Coordination: Coordination normal.   Psychiatric:         Mood and Affect: Mood normal.         Behavior: Behavior normal.         Thought Content:  Thought content normal.         Judgment: Judgment normal.         Assessment/plan:     Patient Active Problem List    Diagnosis Date Noted    Hypotension 06/12/2022     Priority: Medium Enoxaparin    Current medications reviewed  Lab work reviewed  Radiology  films reviewed  Much appreciated treatment recommendations from suspecialities reviewed  Discussed treatment plan with the nurse and addressed all questions/concerns  Discussed with Patient at the bedside in detail . .. he understand and agree with the management plan.       Discharge planning: tbd    Multiple complex medical problems  Morbidity mortality high risk  Medical decision making High complexity         Electronically signed by   Linda Mohan MD,   Internal Medicine Hospitalist   9/2/2023 11:00 AM

## 2023-09-03 LAB
ALBUMIN SERPL-MCNC: 3.2 G/DL (ref 3.5–5.2)
ALP SERPL-CCNC: 51 U/L (ref 40–130)
ALT SERPL-CCNC: 134 U/L (ref 5–41)
ANION GAP SERPL CALCULATED.3IONS-SCNC: 10 MMOL/L (ref 7–19)
AST SERPL-CCNC: 80 U/L (ref 5–40)
BILIRUB SERPL-MCNC: 0.5 MG/DL (ref 0.2–1.2)
BUN SERPL-MCNC: 38 MG/DL (ref 8–23)
CALCIUM SERPL-MCNC: 8.5 MG/DL (ref 8.8–10.2)
CHLORIDE SERPL-SCNC: 106 MMOL/L (ref 98–111)
CO2 SERPL-SCNC: 24 MMOL/L (ref 22–29)
CREAT SERPL-MCNC: 1.3 MG/DL (ref 0.5–1.2)
ERYTHROCYTE [DISTWIDTH] IN BLOOD BY AUTOMATED COUNT: 16.3 % (ref 11.5–14.5)
GLUCOSE BLD-MCNC: 139 MG/DL (ref 70–99)
GLUCOSE BLD-MCNC: 146 MG/DL (ref 70–99)
GLUCOSE BLD-MCNC: 224 MG/DL (ref 70–99)
GLUCOSE BLD-MCNC: 94 MG/DL (ref 70–99)
GLUCOSE SERPL-MCNC: 169 MG/DL (ref 74–109)
HCT VFR BLD AUTO: 26.4 % (ref 42–52)
HGB BLD-MCNC: 8.3 G/DL (ref 14–18)
MCH RBC QN AUTO: 39 PG (ref 27–31)
MCHC RBC AUTO-ENTMCNC: 31.4 G/DL (ref 33–37)
MCV RBC AUTO: 123.9 FL (ref 80–94)
PERFORMED ON: ABNORMAL
PERFORMED ON: NORMAL
PLATELET # BLD AUTO: 130 K/UL (ref 130–400)
PMV BLD AUTO: 11.9 FL (ref 9.4–12.4)
POTASSIUM SERPL-SCNC: 4.6 MMOL/L (ref 3.5–5)
POTASSIUM SERPL-SCNC: 5.2 MMOL/L (ref 3.5–5)
PROT SERPL-MCNC: 5 G/DL (ref 6.6–8.7)
RBC # BLD AUTO: 2.13 M/UL (ref 4.7–6.1)
SODIUM SERPL-SCNC: 140 MMOL/L (ref 136–145)
WBC # BLD AUTO: 7.8 K/UL (ref 4.8–10.8)

## 2023-09-03 PROCEDURE — 2140000000 HC CCU INTERMEDIATE R&B

## 2023-09-03 PROCEDURE — 84132 ASSAY OF SERUM POTASSIUM: CPT

## 2023-09-03 PROCEDURE — 6370000000 HC RX 637 (ALT 250 FOR IP): Performed by: INTERNAL MEDICINE

## 2023-09-03 PROCEDURE — 2580000003 HC RX 258

## 2023-09-03 PROCEDURE — 6370000000 HC RX 637 (ALT 250 FOR IP)

## 2023-09-03 PROCEDURE — 80053 COMPREHEN METABOLIC PANEL: CPT

## 2023-09-03 PROCEDURE — 36415 COLL VENOUS BLD VENIPUNCTURE: CPT

## 2023-09-03 PROCEDURE — 6360000002 HC RX W HCPCS: Performed by: INTERNAL MEDICINE

## 2023-09-03 PROCEDURE — 82962 GLUCOSE BLOOD TEST: CPT

## 2023-09-03 PROCEDURE — 2700000000 HC OXYGEN THERAPY PER DAY

## 2023-09-03 PROCEDURE — 94760 N-INVAS EAR/PLS OXIMETRY 1: CPT

## 2023-09-03 PROCEDURE — 94640 AIRWAY INHALATION TREATMENT: CPT

## 2023-09-03 PROCEDURE — 85027 COMPLETE CBC AUTOMATED: CPT

## 2023-09-03 PROCEDURE — 6360000002 HC RX W HCPCS

## 2023-09-03 RX ADMIN — MIRTAZAPINE 3.75 MG: 7.5 TABLET ORAL at 20:18

## 2023-09-03 RX ADMIN — BUDESONIDE 250 MCG: 0.25 SUSPENSION RESPIRATORY (INHALATION) at 18:19

## 2023-09-03 RX ADMIN — BUDESONIDE 250 MCG: 0.25 SUSPENSION RESPIRATORY (INHALATION) at 06:38

## 2023-09-03 RX ADMIN — CHLORDIAZEPOXIDE HYDROCHLORIDE 25 MG: 25 CAPSULE ORAL at 20:18

## 2023-09-03 RX ADMIN — CHLORDIAZEPOXIDE HYDROCHLORIDE 25 MG: 25 CAPSULE ORAL at 09:28

## 2023-09-03 RX ADMIN — ARFORMOTEROL TARTRATE 15 MCG: 15 SOLUTION RESPIRATORY (INHALATION) at 18:19

## 2023-09-03 RX ADMIN — ASPIRIN 81 MG: 81 TABLET, COATED ORAL at 09:28

## 2023-09-03 RX ADMIN — CLOPIDOGREL BISULFATE 75 MG: 75 TABLET ORAL at 09:28

## 2023-09-03 RX ADMIN — SODIUM BICARBONATE 1300 MG: 650 TABLET ORAL at 14:34

## 2023-09-03 RX ADMIN — IPRATROPIUM BROMIDE AND ALBUTEROL SULFATE 1 DOSE: 2.5; .5 SOLUTION RESPIRATORY (INHALATION) at 06:49

## 2023-09-03 RX ADMIN — INSULIN GLARGINE 20 UNITS: 100 INJECTION, SOLUTION SUBCUTANEOUS at 20:19

## 2023-09-03 RX ADMIN — Medication 3 MG: at 20:18

## 2023-09-03 RX ADMIN — METHYLPREDNISOLONE SODIUM SUCCINATE 40 MG: 40 INJECTION, POWDER, FOR SOLUTION INTRAMUSCULAR; INTRAVENOUS at 05:15

## 2023-09-03 RX ADMIN — SODIUM BICARBONATE 1300 MG: 650 TABLET ORAL at 20:18

## 2023-09-03 RX ADMIN — PANTOPRAZOLE SODIUM 40 MG: 40 TABLET, DELAYED RELEASE ORAL at 05:14

## 2023-09-03 RX ADMIN — ENOXAPARIN SODIUM 40 MG: 100 INJECTION SUBCUTANEOUS at 17:22

## 2023-09-03 RX ADMIN — Medication 100 MG: at 09:28

## 2023-09-03 RX ADMIN — IPRATROPIUM BROMIDE AND ALBUTEROL SULFATE 1 DOSE: 2.5; .5 SOLUTION RESPIRATORY (INHALATION) at 10:36

## 2023-09-03 RX ADMIN — SODIUM BICARBONATE 1300 MG: 650 TABLET ORAL at 09:28

## 2023-09-03 RX ADMIN — FOLIC ACID 1 MG: 1 TABLET ORAL at 09:28

## 2023-09-03 RX ADMIN — METHYLPREDNISOLONE SODIUM SUCCINATE 40 MG: 40 INJECTION, POWDER, FOR SOLUTION INTRAMUSCULAR; INTRAVENOUS at 17:22

## 2023-09-03 RX ADMIN — SODIUM CHLORIDE, PRESERVATIVE FREE 10 ML: 5 INJECTION INTRAVENOUS at 20:19

## 2023-09-03 RX ADMIN — DOXEPIN HYDROCHLORIDE 25 MG: 25 CAPSULE ORAL at 20:19

## 2023-09-03 RX ADMIN — METOPROLOL TARTRATE 6.25 MG: 25 TABLET, FILM COATED ORAL at 09:28

## 2023-09-03 RX ADMIN — ARFORMOTEROL TARTRATE 15 MCG: 15 SOLUTION RESPIRATORY (INHALATION) at 06:38

## 2023-09-03 RX ADMIN — IPRATROPIUM BROMIDE AND ALBUTEROL SULFATE 1 DOSE: 2.5; .5 SOLUTION RESPIRATORY (INHALATION) at 18:19

## 2023-09-03 RX ADMIN — INSULIN GLARGINE 20 UNITS: 100 INJECTION, SOLUTION SUBCUTANEOUS at 09:35

## 2023-09-03 RX ADMIN — IPRATROPIUM BROMIDE AND ALBUTEROL SULFATE 1 DOSE: 2.5; .5 SOLUTION RESPIRATORY (INHALATION) at 14:13

## 2023-09-03 RX ADMIN — METOPROLOL TARTRATE 6.25 MG: 25 TABLET, FILM COATED ORAL at 20:18

## 2023-09-03 RX ADMIN — SODIUM ZIRCONIUM CYCLOSILICATE 10 G: 10 POWDER, FOR SUSPENSION ORAL at 12:10

## 2023-09-03 RX ADMIN — SODIUM CHLORIDE, PRESERVATIVE FREE 10 ML: 5 INJECTION INTRAVENOUS at 09:35

## 2023-09-03 NOTE — PROGRESS NOTES
Nephrology ([facility]) Progress Note      Patient:  Latonya Colon  YOB: 1955  Date of Service: 9/3/2023  MRN: [id number]   Acct: [de-identified]   Primary Care Physician: No primary care provider on file. Advance Directive: Full Code  Admit Date: 8/27/2023       Hospital Day: 7  Referring Provider: Sachin Banks MD    Patient independently seen and examined, Chart, Consults, Notes, Operative notes, Labs, Cardiology, and Radiology studies reviewed as available. Chief complaint: Abnormal labs. Subjective:  Latonya Colon is a 79 y.o. male for whom we were consulted for evaluation and treatment of acute kidney injury/chronic kidney disease. Patient has chronic kidney disease/unknown stage, follows with nephrology in [place]. He also has history of alcohol abuse, CVA, COPD, gastroesophageal reflux disease, hyperlipidemia, hypertension and chronic smoker. He presented to emergency room with increasing shortness of breath associated with chest tightness. He also reported generalized fatigue, lack of energy and tiredness. He continues to smoke cigarette and drink alcohol almost every day. He is now admitted for treatment of COPD exacerbation. His serum creatinine on admission was 1.8 mg. Hospital course remarkable for IV fluid administration and has significant improvement of renal function. This morning patient feels well. However he has shortness of breath due to COPD exacerbation.   His renal function stabilized    Allergies:  Influenza virus vaccine and Niacin and related    Medicines:  Current Facility-Administered Medications   Medication Dose Route Frequency Provider Last Rate Last Admin    chlordiazePOXIDE (LIBRIUM) capsule 25 mg  25 mg Oral BID Sachin Banks MD   25 mg at 09/03/23 2009    sodium bicarbonate tablet 1,300 mg  1,300 mg Oral TID Rafat Rodriguez MD   1,300 mg at 09/03/23 0928    insulin glargine (LANTUS) injection vial 20 Units  20 Units 140 140   K 4.8 4.9 5.2*    108 106   CO2 20* 24 24   BUN 36* 36* 38*   CREATININE 1.1 1.2 1.3*   CALCIUM 8.5* 8.1* 8.5*     CBC:   Recent Labs     09/01/23  0710 09/02/23  0224 09/03/23  0149   WBC 6.7 6.6 7.8   HGB 8.2* 7.1* 8.3*   HCT 25.9* 22.3* 26.4*   .2* 122.5* 123.9*   * 114* 130     LIVER PROFILE:   Recent Labs     09/01/23  0710 09/02/23  0224 09/03/23  0149   AST 70* 63* 80*   ALT 85* 97* 134*   BILITOT 0.4 0.3 0.5   ALKPHOS 54 45 51     PT/INR: No results for input(s): PROTIME, INR in the last 72 hours. APTT: No results for input(s): APTT in the last 72 hours. BNP:  No results for input(s): BNP in the last 72 hours. Ionized Calcium:No results for input(s): IONCA in the last 72 hours. Magnesium:  No results for input(s): MG in the last 72 hours. Phosphorus:  No results for input(s): PHOS in the last 72 hours. HgbA1C:   No results for input(s): LABA1C in the last 72 hours. Hepatic:   Recent Labs     09/01/23 0710 09/02/23 0224 09/03/23 0149   ALKPHOS 54 45 51   ALT 85* 97* 134*   AST 70* 63* 80*   PROT 5.4* 4.5* 5.0*   BILITOT 0.4 0.3 0.5   LABALBU 2.9* 2.9* 3.2*     Lactic Acid:   No results for input(s): LACTA in the last 72 hours. Troponin: No results for input(s): CKTOTAL, CKMB, TROPONINT in the last 72 hours. ABGs: No results for input(s): PH, PCO2, PO2, HCO3, O2SAT in the last 72 hours. CRP:  No results for input(s): CRP in the last 72 hours. Sed Rate:  No results for input(s): SEDRATE in the last 72 hours. Cultures:   No results for input(s): CULTURE in the last 72 hours. No results for input(s): BC, Zully Andrew in the last 72 hours. No results for input(s): CXSURG in the last 72 hours.     Radiology reports as per the Radiologist  Radiology: CT ABDOMEN PELVIS WO CONTRAST Additional Contrast? None    Result Date: 8/28/2023  EXAM: CT ABDOMEN AND PELVIS WITHOUT CONTRAST  HISTORY: abdominal pain  TECHNIQUE: CT acquisition of the abdomen and pelvis

## 2023-09-03 NOTE — PROGRESS NOTES
Physician Progress Note      Caro Foley  [facility] #:                  [id number]  :                       1955  ADMIT DATE:       2023 11:48 AM  DISCH DATE:  RESPONDING  PROVIDER #: Reyna Lowe MD MPH          QUERY TEXT:    Patient admitted with copd exacerbation. Documentation reflects Sepsis in   progress note on  and H & P. If possible, please document in the progress   notes and discharge summary if *** was: The medical record reflects the following:  Risk Factors: COPD, Alcohol abuse, CKD III  Clinical Indicators: lactic acid 2.5, WBC 8.2, 4.4, procalcitonin 3.24. Pulse   110, 120, No source of infection. Treatment: IVF 30 ml/hr, Rocephin 1,000 IVPB, Azithromycin 500 mg IVPB. labs. Thank you  Fela Healy RN, BSN, [facility]  [phone]  Options provided:  -- Sepsis confirmed after study  -- Sepsis  treated and resolved  -- Sepsis ruled out after study  -- Other - I will add my own diagnosis  -- Disagree - Not applicable / Not valid  -- Disagree - Clinically unable to determine / Unknown  -- Refer to Clinical Documentation Reviewer    PROVIDER RESPONSE TEXT:    Sepsis confirmed after study.     Query created by: Fela Healy on 2023 9:40 AM      Electronically signed by:  Reyna Lowe MD MPH 9/3/2023 11:13 AM

## 2023-09-03 NOTE — PLAN OF CARE
Problem: Discharge Planning  Goal: Discharge to home or other facility with appropriate resources  9/2/2023 2152 by Yany Langford RN  Outcome: Progressing  9/2/2023 1523 by Patel Tejada RN  Outcome: Not Progressing     Problem: Skin/Tissue Integrity  Goal: Absence of new skin breakdown  Description: 1. Monitor for areas of redness and/or skin breakdown  2. Assess vascular access sites hourly  3. Every 4-6 hours minimum:  Change oxygen saturation probe site  4. Every 4-6 hours:  If on nasal continuous positive airway pressure, respiratory therapy assess nares and determine need for appliance change or resting period.   Outcome: Progressing     Problem: Safety - Adult  Goal: Free from fall injury  9/2/2023 2152 by Yany Langford RN  Outcome: Progressing  9/2/2023 1523 by Patel Tejada RN  Outcome: Progressing     Problem: Nutrition Deficit:  Goal: Optimize nutritional status  9/2/2023 2152 by Yany Langford RN  Outcome: Progressing  9/2/2023 1523 by Patel Tejada RN  Outcome: Progressing     Problem: Chronic Conditions and Co-morbidities  Goal: Patient's chronic conditions and co-morbidity symptoms are monitored and maintained or improved  9/2/2023 2152 by Yany Langford RN  Outcome: Progressing  9/2/2023 1523 by Patel Tejada RN  Outcome: Not Progressing     Problem: Discharge Planning  Goal: Discharge to home or other facility with appropriate resources  9/2/2023 2152 by Yany Langford RN  Outcome: Progressing  9/2/2023 1523 by Patel Tejada RN  Outcome: Not Progressing     Problem: Chronic Conditions and Co-morbidities  Goal: Patient's chronic conditions and co-morbidity symptoms are monitored and maintained or improved  9/2/2023 2152 by Yany Langford RN  Outcome: Progressing  9/2/2023 1523 by Patel Tejada RN  Outcome: Not Progressing   Electronically signed by Yany Langford RN on 9/2/2023 at 9:52 PM

## 2023-09-03 NOTE — PROGRESS NOTES
Regular; 4 carb choices (60 gm/meal); Low Sodium (2 gm); No Caffeine       Labs:   CBC with DIFF:   Recent Labs     09/01/23  0710 09/02/23  0224 09/03/23  0149   WBC 6.7 6.6 7.8   RBC 2.12* 1.82* 2.13*   HGB 8.2* 7.1* 8.3*   HCT 25.9* 22.3* 26.4*   .2* 122.5* 123.9*   MCH 38.7* 39.0* 39.0*   MCHC 31.7* 31.8* 31.4*   RDW 16.1* 16.1* 16.3*   * 114* 130   MPV 12.1 11.9 11.9         CMP/BMP:   Recent Labs     09/01/23 0710 09/02/23 0224 09/03/23 0149    140 140   K 4.8 4.9 5.2*    108 106   CO2 20* 24 24   ANIONGAP 11 8 10   GLUCOSE 193* 155* 169*   BUN 36* 36* 38*   CREATININE 1.1 1.2 1.3*   LABGLOM >60 >60 >60   CALCIUM 8.5* 8.1* 8.5*   PROT 5.4* 4.5* 5.0*   LABALBU 2.9* 2.9* 3.2*   BILITOT 0.4 0.3 0.5   ALKPHOS 54 45 51   ALT 85* 97* 134*   AST 70* 63* 80*           CRP:  No results for input(s): CRP in the last 72 hours. Sed Rate:  No results for input(s): SEDRATE in the last 72 hours. HgBA1c:  No components found for: HGBA1C  FLP:    Lab Results   Component Value Date/Time    TRIG 232 03/11/2016 05:44 AM    HDL 58 03/11/2016 05:44 AM    LDLCALC 48 03/11/2016 05:44 AM    LDLDIRECT 111 02/14/2016 03:02 AM     TSH:    Lab Results   Component Value Date/Time    TSH 2.880 06/14/2022 09:11 AM     Troponin T:   No results for input(s): TROPONINI in the last 72 hours. Pro-BNP: No results for input(s): BNP in the last 72 hours. INR: No results for input(s): INR in the last 72 hours. ABGs:   Lab Results   Component Value Date/Time    PHART 7.450 08/31/2023 03:17 PM    PO2ART 110.0 08/31/2023 03:17 PM    TSC9EKT 29.0 08/31/2023 03:17 PM     UA:  No results for input(s): NITRITE, COLORU, PHUR, LABCAST, WBCUA, RBCUA, MUCUS, TRICHOMONAS, YEAST, BACTERIA, CLARITYU, SPECGRAV, LEUKOCYTESUR, UROBILINOGEN, BILIRUBINUR, BLOODU, GLUCOSEU, AMORPHOUS in the last 72 hours. Invalid input(s): [place] Files        Culture Results:    No results for input(s): CXSURG in the last 720 hours.     Blood gm/meal); Low Sodium (2 gm); No Caffeine         Consults Made:   IP CONSULT TO SOCIAL WORK  IP CONSULT TO NEPHROLOGY  IP CONSULT TO GI      DVT prophylaxis: Enoxaparin    Current medications reviewed  Lab work reviewed  Radiology  films reviewed  Much appreciated treatment recommendations from suspecialities reviewed  Discussed treatment plan with the nurse and addressed all questions/concerns  Discussed with Patient at the bedside in detail . .. he understand and agree with the management plan.       Discharge planning: tbd    Multiple complex medical problems  Morbidity mortality high risk  Medical decision making High complexity         Electronically signed by   Twan Huerta MD,   Internal Medicine Hospitalist   9/3/2023 8:49 AM

## 2023-09-04 LAB
ALBUMIN SERPL-MCNC: 3.2 G/DL (ref 3.5–5.2)
ALP SERPL-CCNC: 54 U/L (ref 40–130)
ALT SERPL-CCNC: 193 U/L (ref 5–41)
ANION GAP SERPL CALCULATED.3IONS-SCNC: 13 MMOL/L (ref 7–19)
AST SERPL-CCNC: 109 U/L (ref 5–40)
BILIRUB SERPL-MCNC: 0.6 MG/DL (ref 0.2–1.2)
BUN SERPL-MCNC: 39 MG/DL (ref 8–23)
CALCIUM SERPL-MCNC: 8.7 MG/DL (ref 8.8–10.2)
CHLORIDE SERPL-SCNC: 106 MMOL/L (ref 98–111)
CO2 SERPL-SCNC: 23 MMOL/L (ref 22–29)
CREAT SERPL-MCNC: 1.4 MG/DL (ref 0.5–1.2)
ERYTHROCYTE [DISTWIDTH] IN BLOOD BY AUTOMATED COUNT: 16.1 % (ref 11.5–14.5)
GLUCOSE BLD-MCNC: 133 MG/DL (ref 70–99)
GLUCOSE BLD-MCNC: 144 MG/DL (ref 70–99)
GLUCOSE BLD-MCNC: 180 MG/DL (ref 70–99)
GLUCOSE BLD-MCNC: 97 MG/DL (ref 70–99)
GLUCOSE SERPL-MCNC: 179 MG/DL (ref 74–109)
HCT VFR BLD AUTO: 27.9 % (ref 42–52)
HGB BLD-MCNC: 8.8 G/DL (ref 14–18)
MCH RBC QN AUTO: 38.8 PG (ref 27–31)
MCHC RBC AUTO-ENTMCNC: 31.5 G/DL (ref 33–37)
MCV RBC AUTO: 122.9 FL (ref 80–94)
PERFORMED ON: ABNORMAL
PERFORMED ON: NORMAL
PLATELET # BLD AUTO: 144 K/UL (ref 130–400)
PMV BLD AUTO: 11.5 FL (ref 9.4–12.4)
POTASSIUM SERPL-SCNC: 4.9 MMOL/L (ref 3.5–5)
PROT SERPL-MCNC: 5.1 G/DL (ref 6.6–8.7)
RBC # BLD AUTO: 2.27 M/UL (ref 4.7–6.1)
SODIUM SERPL-SCNC: 142 MMOL/L (ref 136–145)
WBC # BLD AUTO: 9.3 K/UL (ref 4.8–10.8)

## 2023-09-04 PROCEDURE — 2700000000 HC OXYGEN THERAPY PER DAY

## 2023-09-04 PROCEDURE — 2580000003 HC RX 258

## 2023-09-04 PROCEDURE — 6370000000 HC RX 637 (ALT 250 FOR IP): Performed by: INTERNAL MEDICINE

## 2023-09-04 PROCEDURE — 94760 N-INVAS EAR/PLS OXIMETRY 1: CPT

## 2023-09-04 PROCEDURE — 6360000002 HC RX W HCPCS: Performed by: INTERNAL MEDICINE

## 2023-09-04 PROCEDURE — 85027 COMPLETE CBC AUTOMATED: CPT

## 2023-09-04 PROCEDURE — 80053 COMPREHEN METABOLIC PANEL: CPT

## 2023-09-04 PROCEDURE — 6360000002 HC RX W HCPCS

## 2023-09-04 PROCEDURE — 36415 COLL VENOUS BLD VENIPUNCTURE: CPT

## 2023-09-04 PROCEDURE — 94640 AIRWAY INHALATION TREATMENT: CPT

## 2023-09-04 PROCEDURE — 82962 GLUCOSE BLOOD TEST: CPT

## 2023-09-04 PROCEDURE — 2140000000 HC CCU INTERMEDIATE R&B

## 2023-09-04 PROCEDURE — 6370000000 HC RX 637 (ALT 250 FOR IP)

## 2023-09-04 RX ADMIN — SODIUM CHLORIDE, PRESERVATIVE FREE 10 ML: 5 INJECTION INTRAVENOUS at 21:40

## 2023-09-04 RX ADMIN — ARFORMOTEROL TARTRATE 15 MCG: 15 SOLUTION RESPIRATORY (INHALATION) at 19:31

## 2023-09-04 RX ADMIN — ERGOCALCIFEROL 50000 UNITS: 1.25 CAPSULE ORAL at 08:45

## 2023-09-04 RX ADMIN — METHYLPREDNISOLONE SODIUM SUCCINATE 40 MG: 40 INJECTION, POWDER, FOR SOLUTION INTRAMUSCULAR; INTRAVENOUS at 06:25

## 2023-09-04 RX ADMIN — ASPIRIN 81 MG: 81 TABLET, COATED ORAL at 08:45

## 2023-09-04 RX ADMIN — MIRTAZAPINE 3.75 MG: 7.5 TABLET ORAL at 21:39

## 2023-09-04 RX ADMIN — DOXEPIN HYDROCHLORIDE 25 MG: 25 CAPSULE ORAL at 21:39

## 2023-09-04 RX ADMIN — FOLIC ACID 1 MG: 1 TABLET ORAL at 08:45

## 2023-09-04 RX ADMIN — CLOPIDOGREL BISULFATE 75 MG: 75 TABLET ORAL at 08:45

## 2023-09-04 RX ADMIN — Medication 100 MG: at 08:45

## 2023-09-04 RX ADMIN — IPRATROPIUM BROMIDE AND ALBUTEROL SULFATE 1 DOSE: 2.5; .5 SOLUTION RESPIRATORY (INHALATION) at 14:09

## 2023-09-04 RX ADMIN — SODIUM BICARBONATE 1300 MG: 650 TABLET ORAL at 21:39

## 2023-09-04 RX ADMIN — ARFORMOTEROL TARTRATE 15 MCG: 15 SOLUTION RESPIRATORY (INHALATION) at 06:28

## 2023-09-04 RX ADMIN — METOPROLOL TARTRATE 6.25 MG: 25 TABLET, FILM COATED ORAL at 21:39

## 2023-09-04 RX ADMIN — METOPROLOL TARTRATE 6.25 MG: 25 TABLET, FILM COATED ORAL at 08:45

## 2023-09-04 RX ADMIN — CHLORDIAZEPOXIDE HYDROCHLORIDE 25 MG: 25 CAPSULE ORAL at 08:45

## 2023-09-04 RX ADMIN — INSULIN GLARGINE 20 UNITS: 100 INJECTION, SOLUTION SUBCUTANEOUS at 21:40

## 2023-09-04 RX ADMIN — ENOXAPARIN SODIUM 40 MG: 100 INJECTION SUBCUTANEOUS at 17:58

## 2023-09-04 RX ADMIN — SODIUM CHLORIDE, PRESERVATIVE FREE 10 ML: 5 INJECTION INTRAVENOUS at 08:49

## 2023-09-04 RX ADMIN — SODIUM BICARBONATE 1300 MG: 650 TABLET ORAL at 08:45

## 2023-09-04 RX ADMIN — INSULIN GLARGINE 20 UNITS: 100 INJECTION, SOLUTION SUBCUTANEOUS at 09:03

## 2023-09-04 RX ADMIN — BUDESONIDE 250 MCG: 0.25 SUSPENSION RESPIRATORY (INHALATION) at 19:31

## 2023-09-04 RX ADMIN — SODIUM ZIRCONIUM CYCLOSILICATE 10 G: 10 POWDER, FOR SUSPENSION ORAL at 08:45

## 2023-09-04 RX ADMIN — Medication 3 MG: at 21:39

## 2023-09-04 RX ADMIN — IPRATROPIUM BROMIDE AND ALBUTEROL SULFATE 1 DOSE: 2.5; .5 SOLUTION RESPIRATORY (INHALATION) at 10:21

## 2023-09-04 RX ADMIN — METHYLPREDNISOLONE SODIUM SUCCINATE 40 MG: 40 INJECTION, POWDER, FOR SOLUTION INTRAMUSCULAR; INTRAVENOUS at 17:58

## 2023-09-04 RX ADMIN — IPRATROPIUM BROMIDE AND ALBUTEROL SULFATE 1 DOSE: 2.5; .5 SOLUTION RESPIRATORY (INHALATION) at 06:40

## 2023-09-04 RX ADMIN — IPRATROPIUM BROMIDE AND ALBUTEROL SULFATE 1 DOSE: 2.5; .5 SOLUTION RESPIRATORY (INHALATION) at 19:31

## 2023-09-04 RX ADMIN — BUDESONIDE 250 MCG: 0.25 SUSPENSION RESPIRATORY (INHALATION) at 06:28

## 2023-09-04 NOTE — PROGRESS NOTES
[facility] Progress Note    Patient:  Jun Welsh  YOB: 1955  Date of Service: 9/4/2023  MRN: [id number]   Acct: [de-identified]   Primary Care Physician: No primary care provider on file. Advance Directive: Full Code  Admit Date: 8/27/2023       Hospital Day: 8        CHIEF COMPLAINT:     Chief Complaint   Patient presents with    Shortness of Breath     X2 weeks, seen at [facility] Wednesday and sent home       9/4/2023 10:34 AM  Subjective / Interval History:   09/04/2023  No acute changes or acute overnight event reported. Patient doing well overall. Continues to endorse intermittent shortness of breath. Laying comfortably in bed in no acute distress. Denies any acute complaints or distress at this time. 09/03/2023  No acute changes or acute overnight event reported. Patient doing well. No new complaints. Denies any acute complaints or distress at this time. Shortness of breath improved. 09/02/2023  Patient seen and examined. Doing well. No new complaints. No acute changes or acute overnight event reported. Laying comfortably in bed in no acute distress. Denies any acute complaints or distress at this time. Continues to endorse shortness of breath.      09/01/2023  Patient doing well. No new complaints. No acute changes or acute overnight event reported. Reportedly still having some dyspnea. Endorses generalized weakness and fatigue. Laying comfortably in bed in no apparent acute distress however. 08/31/2023  No acute changes or acute overnight event reported. Patient doing well. No new complaints. Laying comfortably in bed in no acute distress. Shortness of breath improved. Denies any acute complaints or distress at this time. 08/30/2023  Patient seen and examined. Doing well. No new complaints. No acute changes or acute overnight event reported. Laying comfortably in bed in no acute distress. Shortness of breath improved.   Denies any acute presenting to [facility] (08/27/2023), on account of shortness of breath. Patient reportedly stopped taking all of his meds about a year ago. Patient admitted to [facility], for further work-up and management    Abdominal Pain  Possible gallbladder sludge  Transaminitis  CT Abd/Pelvis WO Con (08/28/2023): Impression: Possible gallbladder sludge. Nonspecific bilateral perinephric stranding. No ureteral obstruction. Colonic diverticulosis. Continue symptomatic and supportive management  Monitor CMP  GI consulted     COPD, with acute exacerbation  -Oxygen supplementation to keep SPO2 between 88-92%  - Nebulized Bronchodilators scheduled and on as-needed basis. --> Ipratropium-Albuterol (DuoNeb's) nebulizer every 6 hours scheduled  --> Albuterol nebulizer every 6 hours when necessary for shortness of breath and/or wheezing. Arformoterol Tartrate (BROVANA) 15 mcg  Nebs BID   Budesonide (PULMICORT)  500 mcg  Neb Q12H   - Systemic Steroids    --> Methylprednisolone (Solu-Medrol) 40 mg IV every 12 hours  - Continue to monitor    AGMA - severe. Suspect this to be primary  of his severe dyspnea. At this time primary source of this appears to be florid renal failure. LA elevated though mild. EtOH was negative. Salicylate negative. No DKA. Cont IVF    LES - prerenal injury vs ATN. CT abd pelvis - no signs of  obstruction   Nephrology on board     Folic  Deficiency  Folate<2  Cont PO replacement. Alcohol abuse. Denies Hx of withdrawal.   Reports stopped drinking 3 days ago  Monitor for symptoms of withdrawal.   Stop CIWA. Librium PO scheduled - tolerating well. Hyperglycemia - likely due to steroid. A1C 6.4  Lantus 20 BID. Prand bolus 5  ISS  Hypoglycemia precautions. Monitor BG closely. Continue management of other chronic medical conditions - see above and orders.           Advance Directive: Full Code    ADULT ORAL NUTRITION SUPPLEMENT; Breakfast, Lunch, Dinner; Diabetic Oral

## 2023-09-04 NOTE — PROGRESS NOTES
Nephrology ([facility]) Progress Note    Patient:  Lexus Welsh  YOB: 1955  Date of Service: 9/4/2023  MRN: [id number]   Acct: [de-identified]   Primary Care Physician: No primary care provider on file. Advance Directive: Full Code  Admit Date: 8/27/2023       Hospital Day: 8  Referring Provider: Heaven Linares MD    Patient independently seen and examined, Chart, Consults, Notes, Operative notes, Labs, Cardiology, and Radiology studies reviewed as available. Subjective:  Lexus Welsh is a 79 y.o. male for whom we were consulted for evaluation and treatment of acute kidney injury/chronic kidney disease. Patient has chronic kidney disease/unknown stage, follows with nephrology in [place], [place]. He also has history of alcohol abuse, CVA, COPD, gastroesophageal reflux disease, hyperlipidemia, hypertension and chronic smoker. He presented to emergency room with increasing shortness of breath associated with chest tightness. He also reported generalized fatigue, lack of energy and tiredness. He continues to smoke cigarette and drink alcohol almost every day. He is now admitted for treatment of COPD exacerbation. His serum creatinine on admission was 1.8. Hospital course remarkable for IV fluid administration and has significant improvement of renal function. Today, no overnight events. Still complains of weakness but denied other complaints upon questioning.     Allergies:  Influenza virus vaccine and Niacin and related    Medicines:  Current Facility-Administered Medications   Medication Dose Route Frequency Provider Last Rate Last Admin    sodium bicarbonate tablet 1,300 mg  1,300 mg Oral TID Shanel Mas MD   1,300 mg at 09/04/23 0845    insulin glargine (LANTUS) injection vial 20 Units  20 Units SubCUTAneous BID Johnna Hightower MD   20 Units at 09/04/23 0903    insulin lispro (HUMALOG) injection vial 5 Units  5 Units SubCUTAneous TID  [facility] !!Pressure! Ratio! !Pressure! Ratio! +--------------------------------------++--------+-----+----+--------+-----+ ! Upper Thigh                           !!151     !1.14 !    !255     !1.92 ! +--------------------------------------++--------+-----+----+--------+-----+ ! Lower Thigh                           !!153     !1.15 !    !161     !1.21 ! +--------------------------------------++--------+-----+----+--------+-----+ ! Calf                                  !!137     !1.03 !    !149     !1.12 ! +--------------------------------------++--------+-----+----+--------+-----+ ! Ankle PT                              !!136     !1.02 !    !136     !1.02 ! +--------------------------------------++--------+-----+----+--------+-----+ ! DPA                                   !!150     !1.13 !    !141     !1.06 ! +--------------------------------------++--------+-----+----+--------+-----+ ! Great Toe                             !!84      !0.63 !    !91      !0.68 ! +--------------------------------------++--------+-----+----+--------+-----+   - Brachial Pressure:Right: 133.   - DERREK:Right: 1.13. Left: 1.06. Plethysmographic Digit Evaluation +---------++--------+-----+---------------++--------+-----+----------------+ ! ! !Right   ! ! Left           !!        !     !                ! +---------++--------+-----+---------------++--------+-----+----------------+ ! Location ! !Pressure! Ratio! PPG Wave Form  ! !Pressure! Ratio! PPG Wave Form   ! +---------++--------+-----+---------------++--------+-----+----------------+ ! Great Toe!!84      !0.63 !               !!91      !0.68 !                ! +---------++--------+-----+---------------++--------+-----+----------------+       Assessment   Acute kidney injury/prerenal  Chronic kidney disease stage II  Diabetes type 2 with diabetic nephropathy  Metabolic acidosis  Hyperkalemia    Plan:  Discussed with patient, nursing  Work-up reviewed today  Monitor labs  Monitor

## 2023-09-05 LAB
ANION GAP SERPL CALCULATED.3IONS-SCNC: 9 MMOL/L (ref 7–19)
BASOPHILS # BLD: 0 K/UL (ref 0–0.2)
BASOPHILS NFR BLD: 0 % (ref 0–1)
BUN SERPL-MCNC: 38 MG/DL (ref 8–23)
CALCIUM SERPL-MCNC: 8.8 MG/DL (ref 8.8–10.2)
CHLORIDE SERPL-SCNC: 108 MMOL/L (ref 98–111)
CO2 SERPL-SCNC: 25 MMOL/L (ref 22–29)
CREAT SERPL-MCNC: 1.5 MG/DL (ref 0.5–1.2)
EOSINOPHIL # BLD: 0.1 K/UL (ref 0–0.6)
EOSINOPHIL NFR BLD: 0.9 % (ref 0–5)
ERYTHROCYTE [DISTWIDTH] IN BLOOD BY AUTOMATED COUNT: 15.9 % (ref 11.5–14.5)
GLUCOSE BLD-MCNC: 130 MG/DL (ref 70–99)
GLUCOSE BLD-MCNC: 133 MG/DL (ref 70–99)
GLUCOSE BLD-MCNC: 231 MG/DL (ref 70–99)
GLUCOSE BLD-MCNC: 75 MG/DL (ref 70–99)
GLUCOSE SERPL-MCNC: 86 MG/DL (ref 74–109)
HCT VFR BLD AUTO: 29 % (ref 42–52)
HGB BLD-MCNC: 9.1 G/DL (ref 14–18)
IMM GRANULOCYTES # BLD: 0.2 K/UL
LYMPHOCYTES # BLD: 0.9 K/UL (ref 1.1–4.5)
LYMPHOCYTES NFR BLD: 9.8 % (ref 20–40)
MCH RBC QN AUTO: 39.2 PG (ref 27–31)
MCHC RBC AUTO-ENTMCNC: 31.4 G/DL (ref 33–37)
MCV RBC AUTO: 125 FL (ref 80–94)
MONOCYTES # BLD: 0.6 K/UL (ref 0–0.9)
MONOCYTES NFR BLD: 6.8 % (ref 0–10)
NEUTROPHILS # BLD: 7 K/UL (ref 1.5–7.5)
NEUTS SEG NFR BLD: 80.1 % (ref 50–65)
PERFORMED ON: ABNORMAL
PERFORMED ON: NORMAL
PLATELET # BLD AUTO: 158 K/UL (ref 130–400)
PMV BLD AUTO: 11.8 FL (ref 9.4–12.4)
POTASSIUM SERPL-SCNC: 4.7 MMOL/L (ref 3.5–5)
RBC # BLD AUTO: 2.32 M/UL (ref 4.7–6.1)
SODIUM SERPL-SCNC: 142 MMOL/L (ref 136–145)
WBC # BLD AUTO: 8.7 K/UL (ref 4.8–10.8)

## 2023-09-05 PROCEDURE — 2700000000 HC OXYGEN THERAPY PER DAY

## 2023-09-05 PROCEDURE — 6360000002 HC RX W HCPCS

## 2023-09-05 PROCEDURE — 6370000000 HC RX 637 (ALT 250 FOR IP): Performed by: INTERNAL MEDICINE

## 2023-09-05 PROCEDURE — 94760 N-INVAS EAR/PLS OXIMETRY 1: CPT

## 2023-09-05 PROCEDURE — 6370000000 HC RX 637 (ALT 250 FOR IP)

## 2023-09-05 PROCEDURE — 85025 COMPLETE CBC W/AUTO DIFF WBC: CPT

## 2023-09-05 PROCEDURE — 80048 BASIC METABOLIC PNL TOTAL CA: CPT

## 2023-09-05 PROCEDURE — 82962 GLUCOSE BLOOD TEST: CPT

## 2023-09-05 PROCEDURE — 97530 THERAPEUTIC ACTIVITIES: CPT

## 2023-09-05 PROCEDURE — 93005 ELECTROCARDIOGRAM TRACING: CPT

## 2023-09-05 PROCEDURE — 2580000003 HC RX 258: Performed by: INTERNAL MEDICINE

## 2023-09-05 PROCEDURE — 94640 AIRWAY INHALATION TREATMENT: CPT

## 2023-09-05 PROCEDURE — 6360000002 HC RX W HCPCS: Performed by: INTERNAL MEDICINE

## 2023-09-05 PROCEDURE — 36415 COLL VENOUS BLD VENIPUNCTURE: CPT

## 2023-09-05 PROCEDURE — 1210000000 HC MED SURG R&B

## 2023-09-05 PROCEDURE — 2580000003 HC RX 258

## 2023-09-05 PROCEDURE — 97535 SELF CARE MNGMENT TRAINING: CPT

## 2023-09-05 RX ADMIN — IPRATROPIUM BROMIDE AND ALBUTEROL SULFATE 1 DOSE: 2.5; .5 SOLUTION RESPIRATORY (INHALATION) at 10:38

## 2023-09-05 RX ADMIN — SODIUM BICARBONATE 1300 MG: 650 TABLET ORAL at 14:52

## 2023-09-05 RX ADMIN — ENOXAPARIN SODIUM 40 MG: 100 INJECTION SUBCUTANEOUS at 16:32

## 2023-09-05 RX ADMIN — BUDESONIDE 250 MCG: 0.25 SUSPENSION RESPIRATORY (INHALATION) at 06:50

## 2023-09-05 RX ADMIN — METHYLPREDNISOLONE SODIUM SUCCINATE 40 MG: 40 INJECTION, POWDER, FOR SOLUTION INTRAMUSCULAR; INTRAVENOUS at 06:21

## 2023-09-05 RX ADMIN — SODIUM BICARBONATE 1300 MG: 650 TABLET ORAL at 21:17

## 2023-09-05 RX ADMIN — Medication 100 MG: at 08:55

## 2023-09-05 RX ADMIN — IPRATROPIUM BROMIDE AND ALBUTEROL SULFATE 1 DOSE: 2.5; .5 SOLUTION RESPIRATORY (INHALATION) at 19:58

## 2023-09-05 RX ADMIN — METOPROLOL TARTRATE 6.25 MG: 25 TABLET, FILM COATED ORAL at 08:54

## 2023-09-05 RX ADMIN — Medication 3 MG: at 21:17

## 2023-09-05 RX ADMIN — METHYLPREDNISOLONE SODIUM SUCCINATE 40 MG: 40 INJECTION, POWDER, FOR SOLUTION INTRAMUSCULAR; INTRAVENOUS at 16:32

## 2023-09-05 RX ADMIN — IPRATROPIUM BROMIDE AND ALBUTEROL SULFATE 1 DOSE: 2.5; .5 SOLUTION RESPIRATORY (INHALATION) at 14:20

## 2023-09-05 RX ADMIN — SODIUM CHLORIDE: 9 INJECTION, SOLUTION INTRAVENOUS at 22:21

## 2023-09-05 RX ADMIN — CLOPIDOGREL BISULFATE 75 MG: 75 TABLET ORAL at 08:54

## 2023-09-05 RX ADMIN — BUDESONIDE 250 MCG: 0.25 SUSPENSION RESPIRATORY (INHALATION) at 19:58

## 2023-09-05 RX ADMIN — ASPIRIN 81 MG: 81 TABLET, COATED ORAL at 08:53

## 2023-09-05 RX ADMIN — SODIUM BICARBONATE 1300 MG: 650 TABLET ORAL at 08:54

## 2023-09-05 RX ADMIN — MIRTAZAPINE 3.75 MG: 7.5 TABLET ORAL at 21:17

## 2023-09-05 RX ADMIN — INSULIN GLARGINE 20 UNITS: 100 INJECTION, SOLUTION SUBCUTANEOUS at 21:16

## 2023-09-05 RX ADMIN — SODIUM CHLORIDE, PRESERVATIVE FREE 10 ML: 5 INJECTION INTRAVENOUS at 21:18

## 2023-09-05 RX ADMIN — FOLIC ACID 1 MG: 1 TABLET ORAL at 08:55

## 2023-09-05 RX ADMIN — ARFORMOTEROL TARTRATE 15 MCG: 15 SOLUTION RESPIRATORY (INHALATION) at 19:58

## 2023-09-05 RX ADMIN — PANTOPRAZOLE SODIUM 40 MG: 40 TABLET, DELAYED RELEASE ORAL at 06:21

## 2023-09-05 RX ADMIN — ARFORMOTEROL TARTRATE 15 MCG: 15 SOLUTION RESPIRATORY (INHALATION) at 06:50

## 2023-09-05 RX ADMIN — IPRATROPIUM BROMIDE AND ALBUTEROL SULFATE 1 DOSE: 2.5; .5 SOLUTION RESPIRATORY (INHALATION) at 06:50

## 2023-09-05 RX ADMIN — METOPROLOL TARTRATE 6.25 MG: 25 TABLET, FILM COATED ORAL at 21:17

## 2023-09-05 RX ADMIN — DOXEPIN HYDROCHLORIDE 25 MG: 25 CAPSULE ORAL at 21:17

## 2023-09-05 NOTE — PROGRESS NOTES
Occupational Therapy     09/05/23 1500   Subjective   Subjective Pt in bed upon arrival for therapy. pt in soiled bedding and had spilled some of his lunch in his bed. Pt required encouragement to participate. Pt presents with tremor. Pain Assessment   Pain Assessment None - Denies Pain   Vitals   O2 Device Nasal cannula   Cognition   Overall Cognitive Status WFL   Orientation   Overall Orientation Status WFL   Bed Mobility Training   Bed Mobility Training Yes   Overall Level of Assistance Maximum assistance;Assist X2   Interventions Verbal cues; Tactile cues;Manual cues   Supine to Sit Maximum assistance;Assist X2   Sit to Supine Maximum assistance;Assist X2   Scooting Maximum assistance;Assist X2   Transfer Training   Transfer Training Yes   Overall Level of Assistance Maximum assistance;Assist X2  Elissa Hdz)   Balance   Sitting Impaired   Sitting - Static Poor (constant support)   Sitting - Dynamic Poor (constant support)   Standing Impaired   Standing - Static Poor   Standing - Dynamic Poor   ADL   Feeding Minimal assistance   Grooming Moderate assistance   UE Bathing Moderate assistance;Maximum assistance   LE Bathing Maximum assistance;Dependent/Total   UE Dressing Moderate assistance;Maximum assistance   LE Dressing Maximum assistance;Dependent/Total   Toileting Maximum assistance;Dependent/Total   Additional Comments Difficulty standing and low activitiy tolerance   Assessment   Assessment Tx focused on bed mobility, sitting EOB and STS mobility at Elissa Hdz level with Max assist x1-2. Pt presents with rigidity and fear of falling. Pt assisted to get cleaned up and reposition in bed.    Activity Tolerance Patient limited by fatigue;Patient limited by endurance   Discharge Recommendations Patient would benefit from continued therapy after discharge;24 hour supervision or assist   Occupational Therapy Plan   Times Per Week 3-5x/week   Times Per Day Once a day   OT Plan of Care   Tuesday X     Electronically

## 2023-09-05 NOTE — PROGRESS NOTES
Physical Therapy     09/05/23 1500   Restrictions/Precautions   Restrictions/Precautions Fall Risk;Seizure   Required Braces or Orthoses? No   Subjective   Subjective reluctant to tx but agreed to therapy after max encouragement   Subjective   Pain no c/o pain   Bed mobility   Supine to Sit Maximum assistance   Sit to Supine Maximum assistance   Scooting Maximal assistance   Bed Mobility Comments pt required max cuing for sequence. initially sat EOB with min/modA x 1 that progressed to CGA after max cuing and bilat feet supported on floor. Transfers   Sit to Stand Moderate Assistance;2 Person Assistance   Stand to Sit Moderate Assistance;2 Person Assistance   Comment STS inside SS. did not transfer to chair due to pt inability to maintain sitting balance in SS- pt with increaseing fatigue displayed significant forward flex posture with modA x 1 to help maintain sitting upright in SS. Short Term Goals   Time Frame for Short Term Goals 2 wks   Short Term Goal 1 supine to sit indep   Short Term Goal 2 sit to stand indep   Short Term Goal 3 amb. 200' with RW SBA   Short Term Goal 4 bed to chair SBA   Activity Tolerance   Activity Tolerance Patient limited by fatigue;Patient limited by endurance   Assessment   Assessment pt unable to be transfered to chair with SS at this time due to sitting weakness/instability noted inside SS despite max cuing for posture. Pt able to stand for 5-8seconds for placement of SS pads but notable shaking/weakness in BUE noted. Pt left in bed with HOB elevated towards chair mode.    PT Plan of Care   Tuesday X   Safety Devices   Type of Devices Left in bed;Gait belt;Bed alarm in place;Call light within reach     Electronically signed by Morro Khoury PTA on 9/5/2023 at 3:27 PM

## 2023-09-05 NOTE — PROGRESS NOTES
Report called to [facility] on 4th. Patient's son updated.     Electronically signed by Emy Loya RN on 9/5/2023 at 4:42 PM

## 2023-09-05 NOTE — PROGRESS NOTES
Nutrition Assessment     Type and Reason for Visit: Reassess    Nutrition Recommendations/Plan:   Continue current POC. Malnutrition Assessment:  Malnutrition Status: At risk for malnutrition (Comment)    Nutrition Assessment:  Pt continues to improve nutritionally. Intake avg >75% most meals. BG improving, remained <200mg/dL over the past 24 hrs. Continue Carb Control diet and ONS. No other needs at this time. Estimated Daily Nutrient Needs:  Energy (kcal):  2638-5032 Weight Used for Energy Requirements: Current     Protein (g):   Weight Used for Protein Requirements: Current        Fluid (ml/day):  1058-1697 Method Used for Fluid Requirements: 1 ml/kcal    Nutrition Related Findings:   L foot wound Wound Type: Open Wounds (L foot)    Current Nutrition Therapies:    ADULT ORAL NUTRITION SUPPLEMENT; Breakfast, Lunch, Dinner; Diabetic Oral Supplement  ADULT DIET; Regular; 4 carb choices (60 gm/meal); Low Sodium (2 gm);  No Caffeine    Anthropometric Measures:  Height: 5' 9\" (175.3 cm)  Current Body Wt: 171 lb 1.6 oz (77.6 kg)   BMI: 25.3    Nutrition Diagnosis:   Inadequate energy intake related to impaired respiratory function, increase demand for energy/nutrients as evidenced by poor intake prior to admission    Nutrition Interventions:   Food and/or Nutrient Delivery: Continue Current Diet, Continue Oral Nutrition Supplement  Nutrition Education/Counseling: No recommendation at this time  Coordination of Nutrition Care: Continue to monitor while inpatient       Goals:  Previous Goal Met: Progressing toward Goal(s)  Goals: PO intake 50% or greater       Nutrition Monitoring and Evaluation:   Behavioral-Environmental Outcomes: None Identified  Food/Nutrient Intake Outcomes: Food and Nutrient Intake, Supplement Intake  Physical Signs/Symptoms Outcomes: Biochemical Data, Weight, Nutrition Focused Physical Findings    Discharge Planning:    No discharge needs at this time     Tawana Brown, MS, RD, LD,

## 2023-09-05 NOTE — PROGRESS NOTES
Occupational Therapy  Pt declines therapy this am, prior to lunch. Pt wants to stay sleeping. Will continue to follow.  Electronically signed by MYRANDA Peñaloza on 9/5/2023 at 12:50 PM

## 2023-09-05 NOTE — PROGRESS NOTES
Nephrology ([facility]) Progress Note    Patient:  Lionel Espinoza  YOB: 1955  Date of Service: 9/5/2023  MRN: [id number]   Acct: [de-identified]   Primary Care Physician: No primary care provider on file. Advance Directive: Full Code  Admit Date: 8/27/2023       Hospital Day: 9  Referring Provider: Jerel Hernandez MD    Patient independently seen and examined, Chart, Consults, Notes, Operative notes, Labs, Cardiology, and Radiology studies reviewed as available. Subjective:  Lionel Espinoza is a 79 y.o. male for whom we were consulted for evaluation and treatment of acute kidney injury/chronic kidney disease. Patient has chronic kidney disease/unknown stage, follows with nephrology in [place], [place]. He also has history of alcohol abuse, CVA, COPD, gastroesophageal reflux disease, hyperlipidemia, hypertension and chronic smoker. He presented to emergency room with increasing shortness of breath associated with chest tightness. He also reported generalized fatigue, lack of energy and tiredness. He continues to smoke cigarette and drink alcohol almost every day. He is now admitted for treatment of COPD exacerbation. His serum creatinine on admission was 1.8. Hospital course remarkable for IV fluid administration and has significant improvement of renal function. Today, no overnight events. Still complains of weakness but denied other complaints upon questioning. Tolerating diet.     Allergies:  Influenza virus vaccine and Niacin and related    Medicines:  Current Facility-Administered Medications   Medication Dose Route Frequency Provider Last Rate Last Admin    sodium bicarbonate tablet 1,300 mg  1,300 mg Oral TID Polina Sebastian MD   1,300 mg at 09/05/23 0854    insulin glargine (LANTUS) injection vial 20 Units  20 Units SubCUTAneous BID Madi Cardenas MD   20 Units at 09/04/23 2140    insulin lispro (HUMALOG) injection vial 5 Units  5 Units SubCUTAneous TID WC +--------------------------------------++--------+-----+----+--------+-----+ ! Upper Thigh                           !!151     !1.14 !    !255     !1.92 ! +--------------------------------------++--------+-----+----+--------+-----+ ! Lower Thigh                           !!153     !1.15 !    !161     !1.21 ! +--------------------------------------++--------+-----+----+--------+-----+ ! Calf                                  !!137     !1.03 !    !149     !1.12 ! +--------------------------------------++--------+-----+----+--------+-----+ ! Ankle PT                              !!136     !1.02 !    !136     !1.02 ! +--------------------------------------++--------+-----+----+--------+-----+ ! DPA                                   !!150     !1.13 !    !141     !1.06 ! +--------------------------------------++--------+-----+----+--------+-----+ ! Great Toe                             !!84      !0.63 !    !91      !0.68 ! +--------------------------------------++--------+-----+----+--------+-----+   - Brachial Pressure:Right: 133.   - DERREK:Right: 1.13. Left: 1.06. Plethysmographic Digit Evaluation +---------++--------+-----+---------------++--------+-----+----------------+ ! ! !Right   ! ! Left           !!        !     !                ! +---------++--------+-----+---------------++--------+-----+----------------+ ! Location ! !Pressure! Ratio! PPG Wave Form  ! !Pressure! Ratio! PPG Wave Form   ! +---------++--------+-----+---------------++--------+-----+----------------+ ! Great Toe!!84      !0.63 !               !!91      !0.68 !                ! +---------++--------+-----+---------------++--------+-----+----------------+       Assessment   Acute kidney injury/prerenal  Chronic kidney disease stage II  Diabetes type 2 with diabetic nephropathy  Metabolic acidosis  Hyperkalemia    Plan:  Discussed with patient, nursing  Work-up reviewed today  Monitor labs  [facility] levels [facility]

## 2023-09-05 NOTE — PROGRESS NOTES
[facility] Progress Note    Patient:  Yahir Gallo  YOB: 1955  Date of Service: 9/5/2023  MRN: [id number]   Acct: [de-identified]   Primary Care Physician: No primary care provider on file. Advance Directive: Full Code  Admit Date: 8/27/2023       Hospital Day: 9        CHIEF COMPLAINT:     Chief Complaint   Patient presents with    Shortness of Breath     X2 weeks, seen at [facility] Wednesday and sent home       9/5/2023 12:31 PM  Subjective / Interval History:   09/05/2023  Patient seen and examined this AM.  No new complaints. Lying comfortably in bed in no acute distress. Denies any acute complaints or distress at this time. 09/04/2023  No acute changes or acute overnight event reported. Patient doing well overall. Continues to endorse intermittent shortness of breath. Laying comfortably in bed in no acute distress. Denies any acute complaints or distress at this time. 09/03/2023  No acute changes or acute overnight event reported. Patient doing well. No new complaints. Denies any acute complaints or distress at this time. Shortness of breath improved. 09/02/2023  Patient seen and examined. Doing well. No new complaints. No acute changes or acute overnight event reported. Laying comfortably in bed in no acute distress. Denies any acute complaints or distress at this time. Continues to endorse shortness of breath.      09/01/2023  Patient doing well. No new complaints. No acute changes or acute overnight event reported. Reportedly still having some dyspnea. Endorses generalized weakness and fatigue. Laying comfortably in bed in no apparent acute distress however. 08/31/2023  No acute changes or acute overnight event reported. Patient doing well. No new complaints. Laying comfortably in bed in no acute distress. Shortness of breath improved. Denies any acute complaints or distress at this time. 08/30/2023  Patient seen and examined.   Doing bolus **OR** dextrose bolus, glucagon (rDNA), dextrose, sodium chloride flush, sodium chloride, ondansetron **OR** ondansetron, polyethylene glycol, acetaminophen **OR** acetaminophen  ADULT ORAL NUTRITION SUPPLEMENT; Breakfast, Lunch, Dinner; Diabetic Oral Supplement  ADULT DIET; Regular; 4 carb choices (60 gm/meal); Low Sodium (2 gm); No Caffeine       Labs:   CBC with DIFF:   Recent Labs     09/03/23  0149 09/04/23  0113   WBC 7.8 9.3   RBC 2.13* 2.27*   HGB 8.3* 8.8*   HCT 26.4* 27.9*   .9* 122.9*   MCH 39.0* 38.8*   MCHC 31.4* 31.5*   RDW 16.3* 16.1*    144   MPV 11.9 11.5         CMP/BMP:   Recent Labs     09/03/23  0149 09/03/23  1151 09/04/23  0113 09/05/23  0141     --  142 142   K 5.2* 4.6 4.9 4.7     --  106 108   CO2 24  --  23 25   ANIONGAP 10  --  13 9   GLUCOSE 169*  --  179* 86   BUN 38*  --  39* 38*   CREATININE 1.3*  --  1.4* 1.5*   LABGLOM >60  --  55* 51*   CALCIUM 8.5*  --  8.7* 8.8   PROT 5.0*  --  5.1*  --    LABALBU 3.2*  --  3.2*  --    BILITOT 0.5  --  0.6  --    ALKPHOS 51  --  54  --    *  --  193*  --    AST 80*  --  109*  --            CRP:  No results for input(s): CRP in the last 72 hours. Sed Rate:  No results for input(s): SEDRATE in the last 72 hours. HgBA1c:  No components found for: HGBA1C  FLP:    Lab Results   Component Value Date/Time    TRIG 232 03/11/2016 05:44 AM    HDL 58 03/11/2016 05:44 AM    LDLCALC 48 03/11/2016 05:44 AM    LDLDIRECT 111 02/14/2016 03:02 AM     TSH:    Lab Results   Component Value Date/Time    TSH 2.880 06/14/2022 09:11 AM     Troponin T:   No results for input(s): TROPONINI in the last 72 hours. Pro-BNP: No results for input(s): BNP in the last 72 hours. INR: No results for input(s): INR in the last 72 hours.   ABGs:   Lab Results   Component Value Date/Time    PHART 7.450 08/31/2023 03:17 PM    PO2ART 110.0 08/31/2023 03:17 PM    ZMD7CQV 29.0 08/31/2023 03:17 PM     UA:  No results for input(s): NITRITE,

## 2023-09-06 ENCOUNTER — APPOINTMENT (OUTPATIENT)
Dept: MRI IMAGING | Age: 68
End: 2023-09-06
Payer: MEDICARE

## 2023-09-06 LAB
ALLENS TEST: ABNORMAL
ANION GAP SERPL CALCULATED.3IONS-SCNC: 11 MMOL/L (ref 7–19)
BASE EXCESS ARTERIAL: 4.7 MMOL/L (ref -2–2)
BASOPHILS # BLD: 0 K/UL (ref 0–0.2)
BASOPHILS # BLD: 0 K/UL (ref 0–0.2)
BASOPHILS NFR BLD: 0 % (ref 0–1)
BASOPHILS NFR BLD: 0 % (ref 0–1)
BUN SERPL-MCNC: 37 MG/DL (ref 8–23)
CALCIUM SERPL-MCNC: 8.8 MG/DL (ref 8.8–10.2)
CARBOXYHEMOGLOBIN ARTERIAL: 2.6 % (ref 0–5)
CHLORIDE SERPL-SCNC: 106 MMOL/L (ref 98–111)
CO2 SERPL-SCNC: 23 MMOL/L (ref 22–29)
CREAT SERPL-MCNC: 1 MG/DL (ref 0.5–1.2)
EOSINOPHIL # BLD: 0 K/UL (ref 0–0.6)
EOSINOPHIL # BLD: 0 K/UL (ref 0–0.6)
EOSINOPHIL NFR BLD: 0 % (ref 0–5)
EOSINOPHIL NFR BLD: 0 % (ref 0–5)
ERYTHROCYTE [DISTWIDTH] IN BLOOD BY AUTOMATED COUNT: 14.9 % (ref 11.5–14.5)
ERYTHROCYTE [DISTWIDTH] IN BLOOD BY AUTOMATED COUNT: 15.1 % (ref 11.5–14.5)
GLUCOSE BLD-MCNC: 146 MG/DL (ref 70–99)
GLUCOSE BLD-MCNC: 209 MG/DL (ref 70–99)
GLUCOSE BLD-MCNC: 244 MG/DL (ref 70–99)
GLUCOSE BLD-MCNC: 349 MG/DL (ref 70–99)
GLUCOSE SERPL-MCNC: 178 MG/DL (ref 74–109)
HCO3 ARTERIAL: 28.1 MMOL/L (ref 22–26)
HCT VFR BLD AUTO: 23.6 % (ref 42–52)
HCT VFR BLD AUTO: 27.7 % (ref 42–52)
HEMOCCULT STL QL: NORMAL
HEMOGLOBIN, ART, EXTENDED: 9.6 G/DL (ref 14–18)
HGB BLD-MCNC: 7.4 G/DL (ref 14–18)
HGB BLD-MCNC: 8.4 G/DL (ref 14–18)
IMM GRANULOCYTES # BLD: 0.1 K/UL
IMM GRANULOCYTES # BLD: 0.2 K/UL
LYMPHOCYTES # BLD: 0.2 K/UL (ref 1.1–4.5)
LYMPHOCYTES # BLD: 0.3 K/UL (ref 1.1–4.5)
LYMPHOCYTES NFR BLD: 2.6 % (ref 20–40)
LYMPHOCYTES NFR BLD: 4.5 % (ref 20–40)
MCH RBC QN AUTO: 37.8 PG (ref 27–31)
MCH RBC QN AUTO: 38.1 PG (ref 27–31)
MCHC RBC AUTO-ENTMCNC: 30.3 G/DL (ref 33–37)
MCHC RBC AUTO-ENTMCNC: 31.4 G/DL (ref 33–37)
MCV RBC AUTO: 121.6 FL (ref 80–94)
MCV RBC AUTO: 124.8 FL (ref 80–94)
METHEMOGLOBIN ARTERIAL: 1.3 %
MONOCYTES # BLD: 0.2 K/UL (ref 0–0.9)
MONOCYTES # BLD: 0.3 K/UL (ref 0–0.9)
MONOCYTES NFR BLD: 2.3 % (ref 0–10)
MONOCYTES NFR BLD: 3.9 % (ref 0–10)
NEUTROPHILS # BLD: 6.2 K/UL (ref 1.5–7.5)
NEUTROPHILS # BLD: 7.5 K/UL (ref 1.5–7.5)
NEUTS SEG NFR BLD: 89.9 % (ref 50–65)
NEUTS SEG NFR BLD: 93.2 % (ref 50–65)
O2 CONTENT ARTERIAL: 12.9 ML/DL
O2 DELIVERY DEVICE: ABNORMAL
O2 SAT, ARTERIAL: 94.9 %
O2 THERAPY: ABNORMAL
OXYGEN FLOW: 2
PCO2 ARTERIAL: 36 MMHG (ref 35–45)
PERFORMED ON: ABNORMAL
PH ARTERIAL: 7.5 (ref 7.35–7.45)
PLATELET # BLD AUTO: 153 K/UL (ref 130–400)
PLATELET # BLD AUTO: 171 K/UL (ref 130–400)
PMV BLD AUTO: 11.6 FL (ref 9.4–12.4)
PMV BLD AUTO: 12 FL (ref 9.4–12.4)
PO2 ARTERIAL: 90 MMHG (ref 80–100)
POTASSIUM BLD-SCNC: 5.3 MMOL/L
POTASSIUM SERPL-SCNC: 5.3 MMOL/L (ref 3.5–5)
RBC # BLD AUTO: 1.94 M/UL (ref 4.7–6.1)
RBC # BLD AUTO: 2.22 M/UL (ref 4.7–6.1)
SAMPLE SOURCE: ABNORMAL
SODIUM SERPL-SCNC: 140 MMOL/L (ref 136–145)
WBC # BLD AUTO: 6.9 K/UL (ref 4.8–10.8)
WBC # BLD AUTO: 8 K/UL (ref 4.8–10.8)

## 2023-09-06 PROCEDURE — 36415 COLL VENOUS BLD VENIPUNCTURE: CPT

## 2023-09-06 PROCEDURE — 2700000000 HC OXYGEN THERAPY PER DAY

## 2023-09-06 PROCEDURE — 6370000000 HC RX 637 (ALT 250 FOR IP): Performed by: INTERNAL MEDICINE

## 2023-09-06 PROCEDURE — 94640 AIRWAY INHALATION TREATMENT: CPT

## 2023-09-06 PROCEDURE — 6370000000 HC RX 637 (ALT 250 FOR IP)

## 2023-09-06 PROCEDURE — 85025 COMPLETE CBC W/AUTO DIFF WBC: CPT

## 2023-09-06 PROCEDURE — 36600 WITHDRAWAL OF ARTERIAL BLOOD: CPT

## 2023-09-06 PROCEDURE — 80048 BASIC METABOLIC PNL TOTAL CA: CPT

## 2023-09-06 PROCEDURE — 6360000002 HC RX W HCPCS

## 2023-09-06 PROCEDURE — 70551 MRI BRAIN STEM W/O DYE: CPT

## 2023-09-06 PROCEDURE — 82272 OCCULT BLD FECES 1-3 TESTS: CPT

## 2023-09-06 PROCEDURE — 93005 ELECTROCARDIOGRAM TRACING: CPT

## 2023-09-06 PROCEDURE — 1210000000 HC MED SURG R&B

## 2023-09-06 PROCEDURE — 6360000002 HC RX W HCPCS: Performed by: INTERNAL MEDICINE

## 2023-09-06 PROCEDURE — 82962 GLUCOSE BLOOD TEST: CPT

## 2023-09-06 PROCEDURE — 2580000003 HC RX 258

## 2023-09-06 PROCEDURE — 94760 N-INVAS EAR/PLS OXIMETRY 1: CPT

## 2023-09-06 PROCEDURE — 82803 BLOOD GASES ANY COMBINATION: CPT

## 2023-09-06 RX ORDER — PREDNISONE 20 MG/1
40 TABLET ORAL DAILY
Status: DISCONTINUED | OUTPATIENT
Start: 2023-09-06 | End: 2023-09-08 | Stop reason: HOSPADM

## 2023-09-06 RX ADMIN — INSULIN LISPRO 2 UNITS: 100 INJECTION, SOLUTION INTRAVENOUS; SUBCUTANEOUS at 18:54

## 2023-09-06 RX ADMIN — SODIUM BICARBONATE 1300 MG: 650 TABLET ORAL at 10:30

## 2023-09-06 RX ADMIN — BUDESONIDE 250 MCG: 0.25 SUSPENSION RESPIRATORY (INHALATION) at 18:50

## 2023-09-06 RX ADMIN — SODIUM BICARBONATE 1300 MG: 650 TABLET ORAL at 16:08

## 2023-09-06 RX ADMIN — Medication 100 MG: at 10:30

## 2023-09-06 RX ADMIN — ARFORMOTEROL TARTRATE 15 MCG: 15 SOLUTION RESPIRATORY (INHALATION) at 18:50

## 2023-09-06 RX ADMIN — SODIUM ZIRCONIUM CYCLOSILICATE 10 G: 10 POWDER, FOR SUSPENSION ORAL at 12:02

## 2023-09-06 RX ADMIN — INSULIN GLARGINE 20 UNITS: 100 INJECTION, SOLUTION SUBCUTANEOUS at 20:21

## 2023-09-06 RX ADMIN — CARBIDOPA AND LEVODOPA 1 TABLET: 10; 100 TABLET ORAL at 20:21

## 2023-09-06 RX ADMIN — PREDNISONE 40 MG: 20 TABLET ORAL at 16:08

## 2023-09-06 RX ADMIN — BUDESONIDE 250 MCG: 0.25 SUSPENSION RESPIRATORY (INHALATION) at 07:34

## 2023-09-06 RX ADMIN — ASPIRIN 81 MG: 81 TABLET, COATED ORAL at 10:30

## 2023-09-06 RX ADMIN — ENOXAPARIN SODIUM 40 MG: 100 INJECTION SUBCUTANEOUS at 18:53

## 2023-09-06 RX ADMIN — IPRATROPIUM BROMIDE AND ALBUTEROL SULFATE 1 DOSE: 2.5; .5 SOLUTION RESPIRATORY (INHALATION) at 14:55

## 2023-09-06 RX ADMIN — CLOPIDOGREL BISULFATE 75 MG: 75 TABLET ORAL at 10:30

## 2023-09-06 RX ADMIN — DOXEPIN HYDROCHLORIDE 25 MG: 25 CAPSULE ORAL at 20:21

## 2023-09-06 RX ADMIN — METOPROLOL TARTRATE 6.25 MG: 25 TABLET, FILM COATED ORAL at 20:21

## 2023-09-06 RX ADMIN — PANTOPRAZOLE SODIUM 40 MG: 40 TABLET, DELAYED RELEASE ORAL at 06:05

## 2023-09-06 RX ADMIN — SODIUM BICARBONATE 1300 MG: 650 TABLET ORAL at 20:21

## 2023-09-06 RX ADMIN — CARBIDOPA AND LEVODOPA 1 TABLET: 10; 100 TABLET ORAL at 16:08

## 2023-09-06 RX ADMIN — METOPROLOL TARTRATE 6.25 MG: 25 TABLET, FILM COATED ORAL at 10:30

## 2023-09-06 RX ADMIN — INSULIN LISPRO 5 UNITS: 100 INJECTION, SOLUTION INTRAVENOUS; SUBCUTANEOUS at 18:53

## 2023-09-06 RX ADMIN — SODIUM CHLORIDE, PRESERVATIVE FREE 10 ML: 5 INJECTION INTRAVENOUS at 20:22

## 2023-09-06 RX ADMIN — ARFORMOTEROL TARTRATE 15 MCG: 15 SOLUTION RESPIRATORY (INHALATION) at 07:34

## 2023-09-06 RX ADMIN — IPRATROPIUM BROMIDE AND ALBUTEROL SULFATE 1 DOSE: 2.5; .5 SOLUTION RESPIRATORY (INHALATION) at 11:35

## 2023-09-06 RX ADMIN — METHYLPREDNISOLONE SODIUM SUCCINATE 40 MG: 40 INJECTION, POWDER, FOR SOLUTION INTRAMUSCULAR; INTRAVENOUS at 06:06

## 2023-09-06 RX ADMIN — INSULIN LISPRO 6 UNITS: 100 INJECTION, SOLUTION INTRAVENOUS; SUBCUTANEOUS at 13:57

## 2023-09-06 RX ADMIN — IPRATROPIUM BROMIDE AND ALBUTEROL SULFATE 1 DOSE: 2.5; .5 SOLUTION RESPIRATORY (INHALATION) at 07:34

## 2023-09-06 RX ADMIN — FOLIC ACID 1 MG: 1 TABLET ORAL at 10:30

## 2023-09-06 RX ADMIN — Medication 3 MG: at 20:21

## 2023-09-06 RX ADMIN — IPRATROPIUM BROMIDE AND ALBUTEROL SULFATE 1 DOSE: 2.5; .5 SOLUTION RESPIRATORY (INHALATION) at 18:45

## 2023-09-06 NOTE — PROGRESS NOTES
Hospitalist Progress Note        PCP: No primary care provider on file. Date of Admission: 8/27/2023    Length of Stay: 10    Chief Complaint: from home, no PO intake, severe dyspnea, ongoing alcohol abuse. Tobacco abuse. Stopped taking all of his meds about a year ago. Subjective:     Pt continues to be weak. Hgb down to 7.4. Pt has ongoing resting tremor - so much so that he is struggling with even simple tasks like feeding self.            Medications:  Reviewed    Infusion Medications    dextrose      sodium chloride       Scheduled Medications    predniSONE  40 mg Oral Daily    sodium zirconium cyclosilicate  10 g Oral Daily    sodium bicarbonate  1,300 mg Oral TID    insulin glargine  20 Units SubCUTAneous BID    insulin lispro  5 Units SubCUTAneous TID WC    insulin lispro  0-8 Units SubCUTAneous TID WC    insulin lispro  0-4 Units SubCUTAneous Nightly    vitamin D  50,000 Units Oral Weekly    ipratropium 0.5 mg-albuterol 2.5 mg  1 Dose Inhalation Q4H WA RT    metoprolol tartrate  6.25 mg Oral BID    aspirin  81 mg Oral Daily    clopidogrel  75 mg Oral Daily    doxepin  25 mg Oral Nightly    folic acid  1 mg Oral Daily    melatonin  3 mg Oral Nightly    mirtazapine  3.75 mg Oral Nightly    pantoprazole  40 mg Oral QAM AC    sodium chloride flush  5-40 mL IntraVENous 2 times per day    arformoterol tartrate  15 mcg Nebulization BID RT    budesonide  0.25 mg Nebulization BID RT    thiamine  100 mg Oral Daily    nicotine  1 patch TransDERmal Daily    enoxaparin  40 mg SubCUTAneous Daily     PRN Meds: glucose, dextrose bolus **OR** dextrose bolus, glucagon (rDNA), dextrose, sodium chloride flush, sodium chloride, ondansetron **OR** ondansetron, polyethylene glycol, acetaminophen **OR** acetaminophen      Intake/Output Summary (Last 24 hours) at 9/6/2023 1355  Last data filed at 9/6/2023 1036  Gross per 24 hour   Intake --   Output 950 ml   Net -950 ml       Physical Exam Performed:    BP (!) 1.015 08/28/2023 09:13 AM    GLUCOSEU Negative 08/28/2023 09:13 AM       Radiology:  XR CHEST PORTABLE   Final Result       Persist enlargement of the cardiomediastinal silhouette. Atherosclerotic vascular disease. Left basilar atelectasis. No edema, consolidation, or pleural fluid.                   ______________________________________    Electronically signed by: Penny Alvares M.D. Date:     08/31/2023   Time:    15:54       US RENAL LIMITED   Final Result       Mildly increased bilateral renal cortical echogenicity which can be seen with medical renal disease.               ______________________________________    Electronically signed by: Flavio Virgen M.D. Date:     08/29/2023   Time:    13:06       VL Lower Extremity Arterial Segmental Pressures W Ppg   Final Result      CT ABDOMEN PELVIS WO CONTRAST Additional Contrast? None   Final Result   Impression:   Possible gallbladder sludge. Nonspecific bilateral perinephric stranding. No ureteral obstruction. Colonic diverticulosis. All CT scans are performed using dose optimization techniques as appropriate to the performed exam and include    at least one of the following: Automated exposure control, adjustment of the mA and/or kV according to size, and the use of iterative reconstruction technique. ______________________________________    Electronically signed by: Karley Caldera D.O. Date:     08/28/2023   Time:    16:46       NM LUNG SCAN PERFUSION ONLY   Final Result       Low probability for pulmonary embolism. ______________________________________    Electronically signed by: Geoff Pandya M.D. Date:     08/27/2023   Time:    16:26       XR FOOT LEFT (MIN 3 VIEWS)   Final Result   Findings / Impression: No fracture or dislocation. ______________________________________    Electronically signed by: Karley Caldera D.O.    Date:     08/27/2023   Time:    14:48       XR CHEST PORTABLE

## 2023-09-07 ENCOUNTER — APPOINTMENT (OUTPATIENT)
Dept: MRI IMAGING | Age: 68
End: 2023-09-07
Payer: MEDICARE

## 2023-09-07 LAB
ALBUMIN SERPL-MCNC: 3.2 G/DL (ref 3.5–5.2)
ALP SERPL-CCNC: 53 U/L (ref 40–130)
ALT SERPL-CCNC: 76 U/L (ref 5–41)
ANION GAP SERPL CALCULATED.3IONS-SCNC: 13 MMOL/L (ref 7–19)
AST SERPL-CCNC: 22 U/L (ref 5–40)
B PARAP IS1001 DNA NPH QL NAA+NON-PROBE: NOT DETECTED
B PERT.PT PRMT NPH QL NAA+NON-PROBE: NOT DETECTED
BASOPHILS # BLD: 0 K/UL (ref 0–0.2)
BASOPHILS NFR BLD: 0 % (ref 0–1)
BILIRUB DIRECT SERPL-MCNC: 0.2 MG/DL (ref 0–0.3)
BILIRUB INDIRECT SERPL-MCNC: 0.2 MG/DL (ref 0.1–1)
BILIRUB SERPL-MCNC: 0.4 MG/DL (ref 0.2–1.2)
BUN SERPL-MCNC: 42 MG/DL (ref 8–23)
C PNEUM DNA NPH QL NAA+NON-PROBE: NOT DETECTED
CALCIUM SERPL-MCNC: 8.8 MG/DL (ref 8.8–10.2)
CHLORIDE SERPL-SCNC: 104 MMOL/L (ref 98–111)
CO2 SERPL-SCNC: 24 MMOL/L (ref 22–29)
CREAT SERPL-MCNC: 1.3 MG/DL (ref 0.5–1.2)
EKG P AXIS: 43 DEGREES
EKG P AXIS: 54 DEGREES
EKG P AXIS: 64 DEGREES
EKG P AXIS: 69 DEGREES
EKG P-R INTERVAL: 146 MS
EKG P-R INTERVAL: 154 MS
EKG P-R INTERVAL: 164 MS
EKG P-R INTERVAL: 166 MS
EKG Q-T INTERVAL: 364 MS
EKG Q-T INTERVAL: 366 MS
EKG Q-T INTERVAL: 370 MS
EKG Q-T INTERVAL: 380 MS
EKG QRS DURATION: 78 MS
EKG QRS DURATION: 80 MS
EKG QTC CALCULATION (BAZETT): 397 MS
EKG QTC CALCULATION (BAZETT): 403 MS
EKG QTC CALCULATION (BAZETT): 413 MS
EKG QTC CALCULATION (BAZETT): 415 MS
EKG T AXIS: 2 DEGREES
EKG T AXIS: 52 DEGREES
EKG T AXIS: 63 DEGREES
EKG T AXIS: 69 DEGREES
EOSINOPHIL # BLD: 0 K/UL (ref 0–0.6)
EOSINOPHIL NFR BLD: 0 % (ref 0–5)
ERYTHROCYTE [DISTWIDTH] IN BLOOD BY AUTOMATED COUNT: 14.8 % (ref 11.5–14.5)
FLUAV RNA NPH QL NAA+NON-PROBE: NOT DETECTED
FLUBV RNA NPH QL NAA+NON-PROBE: NOT DETECTED
GLUCOSE BLD-MCNC: 134 MG/DL (ref 70–99)
GLUCOSE BLD-MCNC: 171 MG/DL (ref 70–99)
GLUCOSE BLD-MCNC: 204 MG/DL (ref 70–99)
GLUCOSE BLD-MCNC: 242 MG/DL (ref 70–99)
GLUCOSE BLD-MCNC: 285 MG/DL (ref 70–99)
GLUCOSE BLD-MCNC: 317 MG/DL (ref 70–99)
GLUCOSE SERPL-MCNC: 249 MG/DL (ref 74–109)
HADV DNA NPH QL NAA+NON-PROBE: NOT DETECTED
HCOV 229E RNA NPH QL NAA+NON-PROBE: NOT DETECTED
HCOV HKU1 RNA NPH QL NAA+NON-PROBE: NOT DETECTED
HCOV NL63 RNA NPH QL NAA+NON-PROBE: NOT DETECTED
HCOV OC43 RNA NPH QL NAA+NON-PROBE: NOT DETECTED
HCT VFR BLD AUTO: 25.3 % (ref 42–52)
HGB BLD-MCNC: 7.8 G/DL (ref 14–18)
HMPV RNA NPH QL NAA+NON-PROBE: NOT DETECTED
HPIV1 RNA NPH QL NAA+NON-PROBE: NOT DETECTED
HPIV2 RNA NPH QL NAA+NON-PROBE: NOT DETECTED
HPIV3 RNA NPH QL NAA+NON-PROBE: NOT DETECTED
HPIV4 RNA NPH QL NAA+NON-PROBE: NOT DETECTED
IMM GRANULOCYTES # BLD: 0.1 K/UL
LIPASE SERPL-CCNC: 102 U/L (ref 13–60)
LYMPHOCYTES # BLD: 0.4 K/UL (ref 1.1–4.5)
LYMPHOCYTES NFR BLD: 4.6 % (ref 20–40)
M PNEUMO DNA NPH QL NAA+NON-PROBE: NOT DETECTED
MCH RBC QN AUTO: 38.2 PG (ref 27–31)
MCHC RBC AUTO-ENTMCNC: 30.8 G/DL (ref 33–37)
MCV RBC AUTO: 124 FL (ref 80–94)
MONOCYTES # BLD: 0.4 K/UL (ref 0–0.9)
MONOCYTES NFR BLD: 4.7 % (ref 0–10)
NEUTROPHILS # BLD: 7 K/UL (ref 1.5–7.5)
NEUTS SEG NFR BLD: 89.8 % (ref 50–65)
PERFORMED ON: ABNORMAL
PLATELET # BLD AUTO: 157 K/UL (ref 130–400)
PMV BLD AUTO: 11.1 FL (ref 9.4–12.4)
POTASSIUM SERPL-SCNC: 5.2 MMOL/L (ref 3.5–5)
PROT SERPL-MCNC: 5 G/DL (ref 6.6–8.7)
RBC # BLD AUTO: 2.04 M/UL (ref 4.7–6.1)
RSV RNA NPH QL NAA+NON-PROBE: NOT DETECTED
RV+EV RNA NPH QL NAA+NON-PROBE: NOT DETECTED
SARS-COV-2 RNA NPH QL NAA+NON-PROBE: NOT DETECTED
SODIUM SERPL-SCNC: 141 MMOL/L (ref 136–145)
WBC # BLD AUTO: 7.8 K/UL (ref 4.8–10.8)

## 2023-09-07 PROCEDURE — 83690 ASSAY OF LIPASE: CPT

## 2023-09-07 PROCEDURE — 1210000000 HC MED SURG R&B

## 2023-09-07 PROCEDURE — 6370000000 HC RX 637 (ALT 250 FOR IP): Performed by: INTERNAL MEDICINE

## 2023-09-07 PROCEDURE — 0202U NFCT DS 22 TRGT SARS-COV-2: CPT

## 2023-09-07 PROCEDURE — 2700000000 HC OXYGEN THERAPY PER DAY

## 2023-09-07 PROCEDURE — 6370000000 HC RX 637 (ALT 250 FOR IP)

## 2023-09-07 PROCEDURE — 6360000002 HC RX W HCPCS

## 2023-09-07 PROCEDURE — 85025 COMPLETE CBC W/AUTO DIFF WBC: CPT

## 2023-09-07 PROCEDURE — 82962 GLUCOSE BLOOD TEST: CPT

## 2023-09-07 PROCEDURE — 80076 HEPATIC FUNCTION PANEL: CPT

## 2023-09-07 PROCEDURE — 94760 N-INVAS EAR/PLS OXIMETRY 1: CPT

## 2023-09-07 PROCEDURE — 2580000003 HC RX 258

## 2023-09-07 PROCEDURE — 2580000003 HC RX 258: Performed by: INTERNAL MEDICINE

## 2023-09-07 PROCEDURE — 36415 COLL VENOUS BLD VENIPUNCTURE: CPT

## 2023-09-07 PROCEDURE — 80048 BASIC METABOLIC PNL TOTAL CA: CPT

## 2023-09-07 PROCEDURE — 94640 AIRWAY INHALATION TREATMENT: CPT

## 2023-09-07 PROCEDURE — 86341 ISLET CELL ANTIBODY: CPT

## 2023-09-07 RX ORDER — SODIUM CHLORIDE 9 MG/ML
INJECTION, SOLUTION INTRAVENOUS CONTINUOUS
Status: ACTIVE | OUTPATIENT
Start: 2023-09-07 | End: 2023-09-07

## 2023-09-07 RX ORDER — INSULIN GLARGINE 100 [IU]/ML
22 INJECTION, SOLUTION SUBCUTANEOUS 2 TIMES DAILY
Status: DISCONTINUED | OUTPATIENT
Start: 2023-09-07 | End: 2023-09-08 | Stop reason: HOSPADM

## 2023-09-07 RX ORDER — INSULIN LISPRO 100 [IU]/ML
7 INJECTION, SOLUTION INTRAVENOUS; SUBCUTANEOUS
Status: DISCONTINUED | OUTPATIENT
Start: 2023-09-07 | End: 2023-09-08 | Stop reason: HOSPADM

## 2023-09-07 RX ADMIN — SODIUM CHLORIDE, PRESERVATIVE FREE 10 ML: 5 INJECTION INTRAVENOUS at 20:45

## 2023-09-07 RX ADMIN — FOLIC ACID 1 MG: 1 TABLET ORAL at 10:41

## 2023-09-07 RX ADMIN — ARFORMOTEROL TARTRATE 15 MCG: 15 SOLUTION RESPIRATORY (INHALATION) at 06:55

## 2023-09-07 RX ADMIN — CLOPIDOGREL BISULFATE 75 MG: 75 TABLET ORAL at 10:40

## 2023-09-07 RX ADMIN — INSULIN LISPRO 4 UNITS: 100 INJECTION, SOLUTION INTRAVENOUS; SUBCUTANEOUS at 20:44

## 2023-09-07 RX ADMIN — DOXEPIN HYDROCHLORIDE 25 MG: 25 CAPSULE ORAL at 20:44

## 2023-09-07 RX ADMIN — ARFORMOTEROL TARTRATE 15 MCG: 15 SOLUTION RESPIRATORY (INHALATION) at 18:33

## 2023-09-07 RX ADMIN — Medication 3 MG: at 20:44

## 2023-09-07 RX ADMIN — SODIUM CHLORIDE: 9 INJECTION, SOLUTION INTRAVENOUS at 10:56

## 2023-09-07 RX ADMIN — BUDESONIDE 250 MCG: 0.25 SUSPENSION RESPIRATORY (INHALATION) at 18:33

## 2023-09-07 RX ADMIN — IPRATROPIUM BROMIDE AND ALBUTEROL SULFATE 1 DOSE: 2.5; .5 SOLUTION RESPIRATORY (INHALATION) at 18:33

## 2023-09-07 RX ADMIN — PANTOPRAZOLE SODIUM 40 MG: 40 TABLET, DELAYED RELEASE ORAL at 06:20

## 2023-09-07 RX ADMIN — PREDNISONE 40 MG: 20 TABLET ORAL at 10:40

## 2023-09-07 RX ADMIN — IPRATROPIUM BROMIDE AND ALBUTEROL SULFATE 1 DOSE: 2.5; .5 SOLUTION RESPIRATORY (INHALATION) at 06:55

## 2023-09-07 RX ADMIN — CARBIDOPA AND LEVODOPA 1 TABLET: 10; 100 TABLET ORAL at 15:18

## 2023-09-07 RX ADMIN — ASPIRIN 81 MG: 81 TABLET, COATED ORAL at 10:40

## 2023-09-07 RX ADMIN — Medication 100 MG: at 10:40

## 2023-09-07 RX ADMIN — INSULIN GLARGINE 22 UNITS: 100 INJECTION, SOLUTION SUBCUTANEOUS at 20:45

## 2023-09-07 RX ADMIN — IPRATROPIUM BROMIDE AND ALBUTEROL SULFATE 1 DOSE: 2.5; .5 SOLUTION RESPIRATORY (INHALATION) at 10:45

## 2023-09-07 RX ADMIN — METOPROLOL TARTRATE 6.25 MG: 25 TABLET, FILM COATED ORAL at 20:44

## 2023-09-07 RX ADMIN — BUDESONIDE 250 MCG: 0.25 SUSPENSION RESPIRATORY (INHALATION) at 06:55

## 2023-09-07 RX ADMIN — INSULIN LISPRO 2 UNITS: 100 INJECTION, SOLUTION INTRAVENOUS; SUBCUTANEOUS at 17:40

## 2023-09-07 RX ADMIN — SODIUM ZIRCONIUM CYCLOSILICATE 10 G: 10 POWDER, FOR SUSPENSION ORAL at 10:40

## 2023-09-07 RX ADMIN — SODIUM BICARBONATE 1300 MG: 650 TABLET ORAL at 20:44

## 2023-09-07 RX ADMIN — METOPROLOL TARTRATE 6.25 MG: 25 TABLET, FILM COATED ORAL at 10:41

## 2023-09-07 RX ADMIN — CARBIDOPA AND LEVODOPA 1 TABLET: 10; 100 TABLET ORAL at 20:44

## 2023-09-07 RX ADMIN — ENOXAPARIN SODIUM 40 MG: 100 INJECTION SUBCUTANEOUS at 17:39

## 2023-09-07 RX ADMIN — CARBIDOPA AND LEVODOPA 1 TABLET: 10; 100 TABLET ORAL at 10:40

## 2023-09-07 RX ADMIN — IPRATROPIUM BROMIDE AND ALBUTEROL SULFATE 1 DOSE: 2.5; .5 SOLUTION RESPIRATORY (INHALATION) at 16:00

## 2023-09-07 RX ADMIN — SODIUM BICARBONATE 1300 MG: 650 TABLET ORAL at 10:41

## 2023-09-07 RX ADMIN — SODIUM BICARBONATE 1300 MG: 650 TABLET ORAL at 15:18

## 2023-09-07 NOTE — PROGRESS NOTES
Physical Therapy  Name: Paige Garcia  MRN:  [id number]  Date of service:  9/7/2023    Patient out of room for test at this time. Physical Therapy will continue to follow and progress as able.     Electronically signed by Carlo Arechiga PTA on 9/7/2023 at 9:49 AM

## 2023-09-07 NOTE — PROGRESS NOTES
Hospitalist Progress Note        PCP: No primary care provider on file. Date of Admission: 8/27/2023    Length of Stay: 11    Chief Complaint: from home, no PO intake, severe dyspnea, ongoing alcohol abuse. Tobacco abuse. Stopped taking all of his meds about a year ago. Subjective:     Tremor much improved. Pt reports he does not feel good in general today. Diffuse myalgia and arthralgia. Afebrile.                Medications:  Reviewed    Infusion Medications    sodium chloride 150 mL/hr at 09/07/23 1056    dextrose      sodium chloride       Scheduled Medications    insulin lispro  7 Units SubCUTAneous TID WC    insulin glargine  22 Units SubCUTAneous BID    predniSONE  40 mg Oral Daily    sodium zirconium cyclosilicate  10 g Oral Daily    carbidopa-levodopa  1 tablet Oral TID    sodium bicarbonate  1,300 mg Oral TID    insulin lispro  0-8 Units SubCUTAneous TID WC    insulin lispro  0-4 Units SubCUTAneous Nightly    vitamin D  50,000 Units Oral Weekly    ipratropium 0.5 mg-albuterol 2.5 mg  1 Dose Inhalation Q4H WA RT    metoprolol tartrate  6.25 mg Oral BID    aspirin  81 mg Oral Daily    clopidogrel  75 mg Oral Daily    doxepin  25 mg Oral Nightly    folic acid  1 mg Oral Daily    melatonin  3 mg Oral Nightly    pantoprazole  40 mg Oral QAM AC    sodium chloride flush  5-40 mL IntraVENous 2 times per day    arformoterol tartrate  15 mcg Nebulization BID RT    budesonide  0.25 mg Nebulization BID RT    thiamine  100 mg Oral Daily    nicotine  1 patch TransDERmal Daily    enoxaparin  40 mg SubCUTAneous Daily     PRN Meds: glucose, dextrose bolus **OR** dextrose bolus, glucagon (rDNA), dextrose, sodium chloride flush, sodium chloride, ondansetron **OR** ondansetron, polyethylene glycol, acetaminophen **OR** acetaminophen      Intake/Output Summary (Last 24 hours) at 9/7/2023 1338  Last data filed at 9/7/2023 1039  Gross per 24 hour   Intake 0 ml   Output 300 ml   Net -300 ml         Physical

## 2023-09-07 NOTE — PLAN OF CARE
Problem: Discharge Planning  Goal: Discharge to home or other facility with appropriate resources  9/7/2023 0025 by Pierce Romo RN  Outcome: Progressing  9/6/2023 1604 by Rios Barragan RN  Outcome: Progressing     Problem: Skin/Tissue Integrity  Goal: Absence of new skin breakdown  Description: 1. Monitor for areas of redness and/or skin breakdown  2. Assess vascular access sites hourly  3. Every 4-6 hours minimum:  Change oxygen saturation probe site  4. Every 4-6 hours:  If on nasal continuous positive airway pressure, respiratory therapy assess nares and determine need for appliance change or resting period.   9/7/2023 0025 by Pierce Romo RN  Outcome: Progressing  9/6/2023 1604 by Rios Barragan RN  Outcome: Progressing     Problem: Safety - Adult  Goal: Free from fall injury  9/7/2023 0025 by Pierce Romo RN  Outcome: Progressing  9/6/2023 1604 by Riso Barragan RN  Outcome: Progressing     Problem: Nutrition Deficit:  Goal: Optimize nutritional status  9/7/2023 0025 by Pierce Romo RN  Outcome: Progressing  9/6/2023 1604 by Rios Barragan RN  Outcome: Progressing     Problem: Chronic Conditions and Co-morbidities  Goal: Patient's chronic conditions and co-morbidity symptoms are monitored and maintained or improved  9/7/2023 0025 by Pierce Romo RN  Outcome: Progressing  9/6/2023 1604 by Rios Barragan RN  Outcome: Progressing     Problem: ABCDS Injury Assessment  Goal: Absence of physical injury  Outcome: Progressing

## 2023-09-07 NOTE — ACP (ADVANCE CARE PLANNING)
Advance Care Planning     Advance Care Planning Inpatient Note  [facility]    Today's Date: 2023  Unit: [facility] 4 ONCOLOGY UNIT    Received request from rounding. Upon review of chart and communication with care team, patient's decision making abilities are not in question. . Patient was/were present in the room during visit. Goals of ACP Conversation:  Discuss advance care planning documents  Facilitate a discussion related to patient's goals of care as they align with the patient's values and beliefs. Health Care Decision Makers:       Primary Decision Maker: David Philippe Child - [phone]  Summary:  Completed New Documents    Advance Care Planning Documents (Patient Wishes):  Living Will/Advance Directive     Assessment:  Patient is awake, alert, open in communication of struggle with Alcohol - Vodka (see visit note). His estrangement from two of his sons and his two stepdaughters -except the one who will call when she wants money. He has one other son who is in [facility] and is the patient's choice as [facility] changed chart to indicate this. Today patient reports wanting to make sure it is only one son, Arthur Cutler of five who will have the ability to act as surrogate and thus documentation completed this day. Patient reports having dyslexia and holds out his hand to show trembling with mention of withdrawal from drinking and having not eaten for 7 days. Patient today is assisted in getting caps off drink and food, cutting meat, and having bed raised, blankets placed and later with further assist in changing soiled bedding. Patient decline PT today and it part  wonders because of not wanting to reveal \"have peed\" the bed prior \"before I ate\" which would have been start of visit. Patient has been disabled  but wasnt till  received monies \"I have dyslexia\". Patient's sister helped Keshia Sauer is dead now\" \"in November \".  Cries \"I don't like talking about note to attending provider or other IDT member.   Teach Back Method used to verify the patient's and/or Healthcare Decision Maker's understanding of key information in the advance directive documents    Electronically signed by Jonatan Shannon MDIV, [address] [place] [address] on 9/7/2023 at 3:13 PM

## 2023-09-07 NOTE — PROGRESS NOTES
Nephrology ([facility]) Progress Note    Patient:  Angelia Davis  YOB: 1955  Date of Service: 9/7/2023  MRN: [id number]   Acct: [de-identified]   Primary Care Physician: No primary care provider on file. Advance Directive: Full Code  Admit Date: 8/27/2023       Hospital Day: 11  Referring Provider: Robina Barrientos MD    Patient independently seen and examined, Chart, Consults, Notes, Operative notes, Labs, Cardiology, and Radiology studies reviewed as available. Subjective:  Angelia Davis is a 79 y.o. male for whom we were consulted for evaluation and treatment of acute kidney injury/chronic kidney disease. Patient has chronic kidney disease/unknown stage, follows with nephrology in [place], [place]. He also has history of alcohol abuse, CVA, COPD, gastroesophageal reflux disease, hyperlipidemia, hypertension and chronic smoker. He presented to emergency room with increasing shortness of breath associated with chest tightness. He also reported generalized fatigue, lack of energy and tiredness. He continues to smoke cigarette and drink alcohol almost every day. He is now admitted for treatment of COPD exacerbation. His serum creatinine on admission was 1.8. Hospital course remarkable for IV fluid administration and has significant improvement of renal function. Today, no overnight events. Still complains of weakness but denied other complaints upon questioning. Tolerating diet.       Allergies:  Influenza virus vaccine and Niacin and related    Medicines:  Current Facility-Administered Medications   Medication Dose Route Frequency Provider Last Rate Last Admin    insulin lispro (HUMALOG) injection vial 7 Units  7 Units SubCUTAneous TID WC Robina Barrientos MD        insulin glargine (LANTUS) injection vial 22 Units  22 Units SubCUTAneous BID Robina Barrientos MD        0.9 % sodium chloride infusion   IntraVENous Continuous oRbina Barrientos MD

## 2023-09-07 NOTE — PROGRESS NOTES
23 1350   Encounter Summary   Encounter Overview/Reason  Follow-up   Service Provided For: Patient   Support System Family members  (Patient at first states \"I don't have family\" then speaks of a niece Elridge Box" also adds Luis Armando Armas, son and does state \"I have two other sons but they don't give a sh*t about me\" and also mentions 2 step daughters.)   Complexity of Encounter Moderate   Begin Time 1344   End Time  1513   Total Time Calculated 89 min   Crisis   Type Relationship concerns  (Patient describes two sons and two step daughters who do not talk to him. \"I hurt my youngest\" \"she thought I was her daddy. \")   Spiritual/Emotional needs   Type Emotional Distress  (Patient struggles with Alcoholism \"God drunk stayed drunk\" and \"I had seizure\" which brought him into the hospital found by \"my worker\" who assists as patient is disabled.)   Grief, Loss, and Adjustments   Type Life Adjustments; Adjustment to illness  (Patient has caregiver \"through my insurance\" who \"pays my bills' and has been disabled since  but wasnt till  received monies \"I have dyslexia\". Patient's sister helped Tami Bradford is dead now\" \"in November \". Cries \"I don't like talking about her. \")   Advance Care Planning   Type ACP conversation  (Completed AD with patient who is alert, cognitive, able to make choices. Handwriting is difficult as hands trembles from detoxing and not eating till \"I am now\" as enjoyed lunch during visit. \"I went 7 days without eating but drinking Vodka\")   Assessment/Intervention/Outcome   Assessment Calm;Sad  (Unable to elaborate \"nope I do not know why I am sad\")   Intervention Sustaining Presence/Ministry of presence;Empowerment  (Patient redirected to positive aspects of living as he reports \"cremate me\" meaning now. Spoke of alternative life - rehab and patient states \"that's what they got planned. \")   Outcome Coping  (Patient reports sense of \"stupid\" what he did but now voiced awareness.)   Plan and \"that's what they got planned. \")   Outcome Coping  (Patient reports sense of \"stupid\" what he did but now voiced awareness.)   Plan and Referrals   Plan/Referrals Continue Support (comment)  (Patient is open to future visits and is open in continuance of discussion.)     Electronically signed by Ronnie Joy MDIV, [facility] on 9/7/2023 at 3:22 PM

## 2023-09-07 NOTE — PROGRESS NOTES
Physical Therapy  Name: Fern Plata  MRN:  [id number]  Date of service:  9/7/2023    Patient declined bed exercises and EOB sitting this pm.  Encouraged to move as much as possible. Physical Therapy will continue to follow and progress as able.     Electronically signed by Lenin Schmitt PTA on 9/7/2023 at 4:13 PM

## 2023-09-08 VITALS
DIASTOLIC BLOOD PRESSURE: 72 MMHG | TEMPERATURE: 96.8 F | WEIGHT: 171.1 LBS | OXYGEN SATURATION: 96 % | RESPIRATION RATE: 18 BRPM | HEART RATE: 75 BPM | HEIGHT: 69 IN | BODY MASS INDEX: 25.34 KG/M2 | SYSTOLIC BLOOD PRESSURE: 130 MMHG

## 2023-09-08 LAB
ANION GAP SERPL CALCULATED.3IONS-SCNC: 11 MMOL/L (ref 7–19)
BASOPHILS # BLD: 0 K/UL (ref 0–0.2)
BASOPHILS NFR BLD: 0 % (ref 0–1)
BUN SERPL-MCNC: 37 MG/DL (ref 8–23)
CALCIUM SERPL-MCNC: 8.5 MG/DL (ref 8.8–10.2)
CHLORIDE SERPL-SCNC: 107 MMOL/L (ref 98–111)
CO2 SERPL-SCNC: 24 MMOL/L (ref 22–29)
CREAT SERPL-MCNC: 1.1 MG/DL (ref 0.5–1.2)
EKG P AXIS: 83 DEGREES
EKG P-R INTERVAL: 166 MS
EKG Q-T INTERVAL: 380 MS
EKG QRS DURATION: 80 MS
EKG QTC CALCULATION (BAZETT): 404 MS
EKG T AXIS: 68 DEGREES
EOSINOPHIL # BLD: 0.1 K/UL (ref 0–0.6)
EOSINOPHIL NFR BLD: 0.8 % (ref 0–5)
ERYTHROCYTE [DISTWIDTH] IN BLOOD BY AUTOMATED COUNT: 14.6 % (ref 11.5–14.5)
GLUCOSE BLD-MCNC: 165 MG/DL (ref 70–99)
GLUCOSE BLD-MCNC: 216 MG/DL (ref 70–99)
GLUCOSE BLD-MCNC: 84 MG/DL (ref 70–99)
GLUCOSE SERPL-MCNC: 127 MG/DL (ref 74–109)
HCT VFR BLD AUTO: 25.6 % (ref 42–52)
HGB BLD-MCNC: 7.8 G/DL (ref 14–18)
IMM GRANULOCYTES # BLD: 0.1 K/UL
LYMPHOCYTES # BLD: 1.2 K/UL (ref 1.1–4.5)
LYMPHOCYTES NFR BLD: 16 % (ref 20–40)
MCH RBC QN AUTO: 38.4 PG (ref 27–31)
MCHC RBC AUTO-ENTMCNC: 30.5 G/DL (ref 33–37)
MCV RBC AUTO: 126.1 FL (ref 80–94)
MONOCYTES # BLD: 0.5 K/UL (ref 0–0.9)
MONOCYTES NFR BLD: 7.3 % (ref 0–10)
NEUTROPHILS # BLD: 5.5 K/UL (ref 1.5–7.5)
NEUTS SEG NFR BLD: 74.8 % (ref 50–65)
PERFORMED ON: ABNORMAL
PERFORMED ON: ABNORMAL
PERFORMED ON: NORMAL
PLATELET # BLD AUTO: 165 K/UL (ref 130–400)
PMV BLD AUTO: 11.3 FL (ref 9.4–12.4)
POTASSIUM SERPL-SCNC: 4.2 MMOL/L (ref 3.5–5)
RBC # BLD AUTO: 2.03 M/UL (ref 4.7–6.1)
SODIUM SERPL-SCNC: 142 MMOL/L (ref 136–145)
WBC # BLD AUTO: 7.4 K/UL (ref 4.8–10.8)

## 2023-09-08 PROCEDURE — 6370000000 HC RX 637 (ALT 250 FOR IP): Performed by: INTERNAL MEDICINE

## 2023-09-08 PROCEDURE — 94760 N-INVAS EAR/PLS OXIMETRY 1: CPT

## 2023-09-08 PROCEDURE — 36415 COLL VENOUS BLD VENIPUNCTURE: CPT

## 2023-09-08 PROCEDURE — 2580000003 HC RX 258

## 2023-09-08 PROCEDURE — 2700000000 HC OXYGEN THERAPY PER DAY

## 2023-09-08 PROCEDURE — 97530 THERAPEUTIC ACTIVITIES: CPT

## 2023-09-08 PROCEDURE — 6370000000 HC RX 637 (ALT 250 FOR IP)

## 2023-09-08 PROCEDURE — 82962 GLUCOSE BLOOD TEST: CPT

## 2023-09-08 PROCEDURE — 6360000002 HC RX W HCPCS

## 2023-09-08 PROCEDURE — 80048 BASIC METABOLIC PNL TOTAL CA: CPT

## 2023-09-08 PROCEDURE — 94640 AIRWAY INHALATION TREATMENT: CPT

## 2023-09-08 PROCEDURE — 85025 COMPLETE CBC W/AUTO DIFF WBC: CPT

## 2023-09-08 RX ORDER — THIAMINE MONONITRATE (VIT B1) 100 MG
100 TABLET ORAL DAILY
Qty: 30 TABLET | Refills: 3 | Status: SHIPPED | OUTPATIENT
Start: 2023-09-08 | End: 2024-01-06

## 2023-09-08 RX ORDER — PREDNISONE 10 MG/1
TABLET ORAL
Qty: 14 TABLET | Refills: 0 | Status: SHIPPED | OUTPATIENT
Start: 2023-09-08

## 2023-09-08 RX ORDER — ASPIRIN 81 MG/1
81 TABLET ORAL DAILY
Qty: 30 TABLET | Refills: 0 | Status: SHIPPED | OUTPATIENT
Start: 2023-09-08

## 2023-09-08 RX ORDER — FOLIC ACID 1 MG/1
1 TABLET ORAL DAILY
Qty: 30 TABLET | Refills: 3 | Status: SHIPPED | OUTPATIENT
Start: 2023-09-08

## 2023-09-08 RX ORDER — OMEPRAZOLE 20 MG/1
20 CAPSULE, DELAYED RELEASE ORAL DAILY
Qty: 30 CAPSULE | Refills: 0 | Status: SHIPPED | OUTPATIENT
Start: 2023-09-08 | End: 2023-10-08

## 2023-09-08 RX ORDER — INSULIN LISPRO 100 [IU]/ML
7 INJECTION, SOLUTION INTRAVENOUS; SUBCUTANEOUS
Qty: 1 EACH | Refills: 1 | Status: SHIPPED | OUTPATIENT
Start: 2023-09-08

## 2023-09-08 RX ORDER — INSULIN GLARGINE 100 [IU]/ML
22 INJECTION, SOLUTION SUBCUTANEOUS 2 TIMES DAILY
Qty: 10 ML | Refills: 3 | Status: SHIPPED | OUTPATIENT
Start: 2023-09-08

## 2023-09-08 RX ORDER — CLOPIDOGREL BISULFATE 75 MG/1
75 TABLET ORAL DAILY
Qty: 30 TABLET | Refills: 1 | Status: SHIPPED | OUTPATIENT
Start: 2023-09-08

## 2023-09-08 RX ORDER — DOXEPIN HYDROCHLORIDE 25 MG/1
25 CAPSULE ORAL NIGHTLY
Qty: 30 CAPSULE | Refills: 3 | Status: SHIPPED | OUTPATIENT
Start: 2023-09-08

## 2023-09-08 RX ADMIN — CARBIDOPA AND LEVODOPA 1 TABLET: 10; 100 TABLET ORAL at 09:10

## 2023-09-08 RX ADMIN — CLOPIDOGREL BISULFATE 75 MG: 75 TABLET ORAL at 09:10

## 2023-09-08 RX ADMIN — BUDESONIDE 250 MCG: 0.25 SUSPENSION RESPIRATORY (INHALATION) at 06:10

## 2023-09-08 RX ADMIN — ARFORMOTEROL TARTRATE 15 MCG: 15 SOLUTION RESPIRATORY (INHALATION) at 06:10

## 2023-09-08 RX ADMIN — CARBIDOPA AND LEVODOPA 1 TABLET: 10; 100 TABLET ORAL at 15:21

## 2023-09-08 RX ADMIN — FOLIC ACID 1 MG: 1 TABLET ORAL at 09:10

## 2023-09-08 RX ADMIN — Medication 100 MG: at 09:10

## 2023-09-08 RX ADMIN — PREDNISONE 40 MG: 20 TABLET ORAL at 09:10

## 2023-09-08 RX ADMIN — IPRATROPIUM BROMIDE AND ALBUTEROL SULFATE 1 DOSE: 2.5; .5 SOLUTION RESPIRATORY (INHALATION) at 06:00

## 2023-09-08 RX ADMIN — IPRATROPIUM BROMIDE AND ALBUTEROL SULFATE 1 DOSE: 2.5; .5 SOLUTION RESPIRATORY (INHALATION) at 10:00

## 2023-09-08 RX ADMIN — METOPROLOL TARTRATE 6.25 MG: 25 TABLET, FILM COATED ORAL at 09:12

## 2023-09-08 RX ADMIN — PANTOPRAZOLE SODIUM 40 MG: 40 TABLET, DELAYED RELEASE ORAL at 09:10

## 2023-09-08 RX ADMIN — SODIUM CHLORIDE, PRESERVATIVE FREE 10 ML: 5 INJECTION INTRAVENOUS at 09:16

## 2023-09-08 RX ADMIN — IPRATROPIUM BROMIDE AND ALBUTEROL SULFATE 1 DOSE: 2.5; .5 SOLUTION RESPIRATORY (INHALATION) at 15:08

## 2023-09-08 RX ADMIN — ASPIRIN 81 MG: 81 TABLET, COATED ORAL at 09:10

## 2023-09-08 NOTE — CARE COORDINATION
Second IMM given to patient and explained with patient verbalizing understanding. All questions and concerns addressed     Signed letter placed in pt soft chart  Patient declined waiting 4 hr period prior to discharge.   Electronically signed by SHANDRA Calderon on 9/8/2023 at 10:37 AM     09/08/23 1037   IMM Letter   IMM Letter given to Patient/Family/Significant other/Guardian/POA/by: ruby presented Pt with second imm   IMM Letter date given: 09/08/23   IMM Letter time given: 1002

## 2023-09-08 NOTE — PROGRESS NOTES
Occupational Therapy     09/08/23 1330   Subjective   Subjective Pt in bed upon arrival for therapy. Pt agreeable to participate with encouragement. Pain Assessment   Pain Assessment None - Denies Pain   Cognition   Overall Cognitive Status Exceptions   Orientation   Overall Orientation Status WFL   Bed Mobility Training   Bed Mobility Training Yes   Overall Level of Assistance Moderate assistance;Maximum assistance   Interventions Verbal cues; Tactile cues;Manual cues   Rolling Moderate assistance   Supine to Sit Moderate assistance   Sit to Supine Moderate assistance;Maximum assistance   Scooting Maximum assistance;Assist X2   Transfer Training   Transfer Training No   Balance   Sitting Impaired   Sitting - Static Occasional;Poor (constant support)   Sitting - Dynamic Poor (constant support)   Standing Impaired   Standing - Static Not tested   Standing - Dynamic Not tested   ADL   Feeding Setup;Minimal assistance   Grooming Setup;Minimal assistance; Moderate assistance   UE Bathing Moderate assistance;Maximum assistance   LE Bathing Maximum assistance;Dependent/Total   UE Dressing Moderate assistance   LE Dressing Maximum assistance   Toileting Maximum assistance;Dependent/Total   Additional Comments Difficulty standing and low activitiy tolerance   Assessment   Assessment Tx focused on sitting EOB to perform sitting balance activities. Pt tolerated sitting EOB for 8 mins this date. pt assisted back to bed and repositioned for comfort.    Activity Tolerance Patient tolerated treatment well;Patient limited by endurance   Discharge Recommendations Patient would benefit from continued therapy after discharge;24 hour supervision or assist   Occupational Therapy Plan   Times Per Week 3-5x/week   Times Per Day Once a day   OT Plan of Care   Friday X     Electronically signed by MYRANDA Clayton on 9/8/2023 at 4:11 PM

## 2023-09-08 NOTE — PROGRESS NOTES
Physical Therapy     09/08/23 1500   Restrictions/Precautions   Restrictions/Precautions Fall Risk;Seizure   Required Braces or Orthoses? No   Subjective   Subjective agreed to tx   Subjective   Pain no c/o pain   Bed mobility   Supine to Sit Moderate assistance   Sit to Supine Moderate assistance   Scooting Moderate assistance;Maximal assistance;2 Person assistance   Bed Mobility Comments modA x 1 for scooting to EOB and maxA x 2 for scooting towards [facility] with cuing to push with BLE. sat EOB for 5-7 min with difficulty reaching across midline with single UE support secondary to weakness/fatigue. Short Term Goals   Time Frame for Short Term Goals 2 wks   Short Term Goal 1 supine to sit indep   Short Term Goal 2 sit to stand indep   Short Term Goal 3 amb. 200' with RW SBA   Short Term Goal 4 bed to chair SBA   Activity Tolerance   Activity Tolerance Patient limited by fatigue;Patient limited by endurance   Assessment   Assessment pt able to improve sit<>supine from max to modA with use of bed functions and rail. Able to maintain sitting balance EOB with BUE support but notable forward flexion of trunk with single UE support wth difficulty crossing midline.    PT Plan of Care   Friday X   Safety Devices   Type of Devices Left in bed;Bed alarm in place;Call light within reach     Electronically signed by Janeen De Souza PTA on 9/8/2023 at 3:28 PM

## 2023-09-08 NOTE — PROGRESS NOTES
mg  40 mg Oral QAM AC Keyanna Pulling, APRN - CNP   40 mg at 09/08/23 0910    sodium chloride flush 0.9 % injection 5-40 mL  5-40 mL IntraVENous 2 times per day Keyanna Pulling, APRN - CNP   10 mL at 09/08/23 0916    sodium chloride flush 0.9 % injection 5-40 mL  5-40 mL IntraVENous PRN Keyanna Pulling, APRN - CNP        0.9 % sodium chloride infusion   IntraVENous PRN Keyanna Pulling, APRN - CNP        ondansetron (ZOFRAN-ODT) disintegrating tablet 4 mg  4 mg Oral Q8H PRN Keyanna Pulling, APRN - CNP        Or    ondansetron [facility]) injection 4 mg  4 mg IntraVENous Q6H PRN Keyanna Pulling, APRN - CNP        polyethylene glycol (GLYCOLAX) packet 17 g  17 g Oral Daily PRN Keyanna Pulling, APRN - CNP        acetaminophen (TYLENOL) tablet 650 mg  650 mg Oral Q6H PRN Keyanna Pulling, APRN - CNP   650 mg at 08/28/23 4630    Or    acetaminophen (TYLENOL) suppository 650 mg  650 mg Rectal Q6H PRN Keyanna Pulling, APRN - CNP        arformoterol tartrate (BROVANA) nebulizer solution 15 mcg  15 mcg Nebulization BID RT Keyanna Pulling, APRN - CNP   15 mcg at 09/08/23 0610    budesonide (PULMICORT) nebulizer suspension 250 mcg  0.25 mg Nebulization BID RT Keyanna Pulling, APRN - CNP   250 mcg at 09/08/23 4655    thiamine mononitrate tablet 100 mg  100 mg Oral Daily Keyanna Pulling, APRN - CNP   100 mg at 09/08/23 0910    nicotine (NICODERM CQ) 14 MG/24HR 1 patch  1 patch TransDERmal Daily Keyanna Pulling, APRN - CNP   1 patch at 09/08/23 0910    enoxaparin (LOVENOX) injection 40 mg  40 mg SubCUTAneous Daily Keyanna Pulling, APRN - CNP   40 mg at 09/07/23 1739       Past Medical History:  Past Medical History:   Diagnosis Date    Alcohol abuse     Lonliness    Cerebellar stroke, acute ([address]) 02/12/2016    Left    COPD (chronic obstructive pulmonary disease) ([address])     Diagnosed in 2000    Degenerative disc disease at L5-S1 level     GERD (gastroesophageal reflux disease)     Duodenal ulcer    Hyperlipidemia     Hypertension     Iron (Fe) +--------------------------------------++--------+-----+----+--------+-----+ ! Calf                                  !!137     !1.03 !    !149     !1.12 ! +--------------------------------------++--------+-----+----+--------+-----+ ! Ankle PT                              !!136     !1.02 !    !136     !1.02 ! +--------------------------------------++--------+-----+----+--------+-----+ ! DPA                                   !!150     !1.13 !    !141     !1.06 ! +--------------------------------------++--------+-----+----+--------+-----+ ! Great Toe                             !!84      !0.63 !    !91      !0.68 ! +--------------------------------------++--------+-----+----+--------+-----+   - Brachial Pressure:Right: 133.   - DERREK:Right: 1.13. Left: 1.06. Plethysmographic Digit Evaluation +---------++--------+-----+---------------++--------+-----+----------------+ ! ! !Right   ! ! Left           !!        !     !                ! +---------++--------+-----+---------------++--------+-----+----------------+ ! Location ! !Pressure! Ratio! PPG Wave Form  ! !Pressure! Ratio! PPG Wave Form   ! +---------++--------+-----+---------------++--------+-----+----------------+ ! Great Toe!!84      !0.63 !               !!91      !0.68 !                ! +---------++--------+-----+---------------++--------+-----+----------------+       Assessment   Acute kidney injury/prerenal  Chronic kidney disease stage II  Diabetes type 2 with diabetic nephropathy  Metabolic acidosis  Hyperkalemia    Plan:  Discussed with patient, nursing  Work-up reviewed today  Monitor labs  Hold Lokelma given potassium around 4  IVF adjusted as per orders    Marylee Soman, MD  09/08/23  11:54 AM

## 2023-09-08 NOTE — DISCHARGE SUMMARY
Hospital Medicine Discharge Summary    Patient ID: Nicole Dubon      Patient's PCP: No primary care provider on file. Admit Date: 8/27/2023     Discharge Date:   9/8/2023    Admitting Provider: Darya Philippe MD     Discharge Provider: Darya Philippe MD     Discharge Diagnoses: Active Hospital Problems    Diagnosis     COPD with acute exacerbation (HCC) [J44.1]     Respiratory alkalosis [E87.3]     CKD (chronic kidney disease), stage III ([address]) [N18.30]     Alcohol abuse [F10.10]     Sepsis ([address]) [A41.9]        The patient was seen and examined on day of discharge and this discharge summary is in conjunction with any daily progress note from day of discharge. Hospital Course: 73yo man prior Hx of CVA, CAD, stopped taking all of his meds over a year ago, presented from home with no PO intake, severe dyspnea, ongoing alcohol and tobacco abuse. COPD with acute exacerbation. Stop solumedrol   Weaned to PO prednisone and started 2 week taper. Cont regimen of ICS and IBD. AGMA - severe on presentation in setting of LES. Now resolved. Hyperkalemia - recurrent though asymptomatic. On lokelma. Folate deficiency - will need continued supplement on discharge. Resting pill rolling tremor. Pt could potentially be suffering from essential tremor, and alcohol abuse likely contributory as well, but combination with ataxia and gait shuffle and weakness and flat facies makes me concerned about potentially Parkinsonism              - MRI brain - shows known prior left cerebellar infarct, but no acute findings. - started trial of sinemet low dose with close reassess - with good effect               - failed BB therapy to control tremors. Anemia. Hgb low but stable. No signs of active hemorrhage. Hyperglycemia. A1C 6.4   In part secondary to steroid.    Ongoing need for insulin coverage - will continue on discharge to

## 2023-09-08 NOTE — PLAN OF CARE
Problem: Discharge Planning  Goal: Discharge to home or other facility with appropriate resources  Outcome: Adequate for Discharge     Problem: Skin/Tissue Integrity  Goal: Absence of new skin breakdown  Description: 1. Monitor for areas of redness and/or skin breakdown  2. Assess vascular access sites hourly  3. Every 4-6 hours minimum:  Change oxygen saturation probe site  4. Every 4-6 hours:  If on nasal continuous positive airway pressure, respiratory therapy assess nares and determine need for appliance change or resting period.   Outcome: Adequate for Discharge     Problem: Safety - Adult  Goal: Free from fall injury  Outcome: Adequate for Discharge     Problem: Nutrition Deficit:  Goal: Optimize nutritional status  Outcome: Adequate for Discharge     Problem: Chronic Conditions and Co-morbidities  Goal: Patient's chronic conditions and co-morbidity symptoms are monitored and maintained or improved  Outcome: Adequate for Discharge     Problem: ABCDS Injury Assessment  Goal: Absence of physical injury  Outcome: Adequate for Discharge

## 2023-09-09 LAB
GAD65 AB SER IA-ACNC: <5 IU/ML (ref 0–5)
PANC ISLET CELL AB TITR SER: NORMAL {TITER}

## 2023-09-11 NOTE — PROGRESS NOTES
Physician Progress Note      PATIENT:               Ezra Hardin  CSN #:                  [id number]  :                       1955  ADMIT DATE:       2023 11:48 AM  [address] [place] [address] DATE:        2023 4:00 PM  RESPONDING  PROVIDER #:        Bryan Flores MD          QUERY TEXT:    Pt admitted with shortness of breath and has anemia documented. If possible,   please document in progress notes and discharge summary further specificity   regarding the acuity and type of anemia:    The medical record reflects the following:  Risk Factors: Alcohol abuse, Sepsis, Respiratory Alkalosis  Clinical Indicators:  hgb 7.1., 7.8, 8.4, Folate deficiency , LES, CKD  Treatment: Labs,    Thank you  Stevie Brenner RN, BSN, CDI  [phone]  Options provided:  -- Anemia due to chronic blood loss  -- Anemia due to acute on chronic blood loss  -- Anemia due to iron deficiency  -- Anemia due to CKD  -- Dilutional anemia  -- Other - I will add my own diagnosis  -- Disagree - Not applicable / Not valid  -- Disagree - Clinically unable to determine / Unknown  -- Refer to Clinical Documentation Reviewer    PROVIDER RESPONSE TEXT:    This patient has acute on chronic blood loss anemia. Query created by: Stevie Brenner on 2023 8:55 AM      QUERY TEXT:    Pt admitted with shortness of breath and has anemia documented.  If possible,   please document in progress notes and discharge summary further specificity   regarding the acuity and type of anemia:    The medical record reflects the following:  Risk Factors: Alcohol abuse, Sepsis, Respiratory Alkalosis  Clinical Indicators:  hgb 7.1., 7.8, 8.4, Folate deficiency , LES, CKD  Treatment: Labs,    Thank you  Stevie Brenner RN, BSN, CDI  [phone]  Options provided:  -- Anemia due to chronic blood loss  -- Anemia due to acute on chronic blood loss  -- Anemia due to iron deficiency  -- Anemia due to CKD  -- Dilutional anemia  -- Other - I will add my own diagnosis  -- Disagree -

## 2023-09-12 ENCOUNTER — TELEPHONE (OUTPATIENT)
Dept: GASTROENTEROLOGY | Age: 68
End: 2023-09-12

## 2023-09-12 NOTE — TELEPHONE ENCOUNTER
Vesta with [facility]INE[facility] called to schedule a [facility] follow up.   [facility] can be reached at [phone]

## 2023-09-22 ENCOUNTER — OFFICE VISIT (OUTPATIENT)
Dept: GASTROENTEROLOGY | Age: 68
End: 2023-09-22
Payer: MEDICARE

## 2023-09-22 ENCOUNTER — TELEPHONE (OUTPATIENT)
Dept: GASTROENTEROLOGY | Age: 68
End: 2023-09-22

## 2023-09-22 VITALS
DIASTOLIC BLOOD PRESSURE: 70 MMHG | HEART RATE: 90 BPM | OXYGEN SATURATION: 97 % | BODY MASS INDEX: 25.92 KG/M2 | SYSTOLIC BLOOD PRESSURE: 120 MMHG | HEIGHT: 69 IN | WEIGHT: 175 LBS

## 2023-09-22 DIAGNOSIS — D64.9 ANEMIA, UNSPECIFIED TYPE: ICD-10-CM

## 2023-09-22 DIAGNOSIS — D64.9 ANEMIA, UNSPECIFIED TYPE: Primary | ICD-10-CM

## 2023-09-22 DIAGNOSIS — R13.10 DYSPHAGIA, UNSPECIFIED TYPE: ICD-10-CM

## 2023-09-22 LAB
HCT VFR BLD AUTO: 32.6 % (ref 42–52)
HGB BLD-MCNC: 9.9 G/DL (ref 14–18)

## 2023-09-22 PROCEDURE — G8419 CALC BMI OUT NRM PARAM NOF/U: HCPCS | Performed by: NURSE PRACTITIONER

## 2023-09-22 PROCEDURE — 99204 OFFICE O/P NEW MOD 45 MIN: CPT | Performed by: NURSE PRACTITIONER

## 2023-09-22 PROCEDURE — 3078F DIAST BP <80 MM HG: CPT | Performed by: NURSE PRACTITIONER

## 2023-09-22 PROCEDURE — 1123F ACP DISCUSS/DSCN MKR DOCD: CPT | Performed by: NURSE PRACTITIONER

## 2023-09-22 PROCEDURE — 4004F PT TOBACCO SCREEN RCVD TLK: CPT | Performed by: NURSE PRACTITIONER

## 2023-09-22 PROCEDURE — 3074F SYST BP LT 130 MM HG: CPT | Performed by: NURSE PRACTITIONER

## 2023-09-22 PROCEDURE — 3017F COLORECTAL CA SCREEN DOC REV: CPT | Performed by: NURSE PRACTITIONER

## 2023-09-22 PROCEDURE — G8427 DOCREV CUR MEDS BY ELIG CLIN: HCPCS | Performed by: NURSE PRACTITIONER

## 2023-09-22 PROCEDURE — 1111F DSCHRG MED/CURRENT MED MERGE: CPT | Performed by: NURSE PRACTITIONER

## 2023-09-22 RX ORDER — ACETAMINOPHEN 500 MG
1000 TABLET ORAL EVERY 6 HOURS PRN
COMMUNITY

## 2023-09-22 RX ORDER — TRAMADOL HYDROCHLORIDE 100 MG/1
100 TABLET, EXTENDED RELEASE ORAL 2 TIMES DAILY
COMMUNITY

## 2023-09-22 RX ORDER — BISACODYL 10 MG
10 SUPPOSITORY, RECTAL RECTAL DAILY PRN
COMMUNITY

## 2023-09-22 NOTE — TELEPHONE ENCOUNTER
----- Message from KRYSTIAN Pollack sent at 9/22/2023 12:56 PM CDT -----  Hemoglobin and Hematocrit are improved to 9.9 and 32.6

## 2023-09-22 NOTE — TELEPHONE ENCOUNTER
[address] [facility],  [phone]  S/w nurse Susy Aranda and gave him lab results on . Gisela Teresa.

## 2023-09-22 NOTE — PROGRESS NOTES
Subjective:     Patient ID: Juan Munoz is a 79 y.o. male  PCP: No primary care provider on file. Referring Provider: No ref. provider found    HPI  Patient presents to the office today with the following complaints: New Patient      Patient seen in the office today for hospital follow. He resides at [facility] He presented to the ED with complaints of shortness of breath and weakness  He was found to have anemia with a H&H of 7.8 & 25.6 as of the last reading I have   Reports he is not seeing any blood in his stool and he is not          vomiting blood or having black stools. Denies issues with diarrhea or constipation  Denies abd pain. Reports he does have some trouble swallowing at times, feels like food gets stuck   Lab Results   Component Value Date    WBC 7.4 09/08/2023    HGB 9.9 (L) 09/22/2023    HCT 32.6 (L) 09/22/2023    .1 (H) 09/08/2023     09/08/2023    LYMPHOPCT 16.0 (L) 09/08/2023    RBC 2.03 (L) 09/08/2023    MCH 38.4 (H) 09/08/2023    MCHC 30.5 (L) 09/08/2023    RDW 14.6 (H) 09/08/2023            Assessment:     1. Anemia, unspecified type  -     Hemoglobin and Hematocrit; Future  2. Dysphagia, unspecified type           Plan:   Schedule Colonoscopy and EGD  Instruct on bowel prep. Nothing to eat or drink after midnight the day of the exam.  Unable to drive for 24 hours after the procedure. No aspirin or nonsteroidal anti-inflammatories for 5 days before procedure. I have discussed the benefits, alternatives, and risks (including bleeding, perforation and death)  for pursuing Endoscopy (EGD/Colonscopy/EUS/ERCP) with the patient and they are willing to continue. We also discussed the need for anesthesia, IV access, proper dietary changes, medication changes if necessary, and need for bowel prep (if ordered) prior to their Endoscopic procedure.   They are aware they must have someone accompany them to their scheduled procedure to drive

## 2023-09-26 ASSESSMENT — ENCOUNTER SYMPTOMS
CONSTIPATION: 0
BLOOD IN STOOL: 0
ABDOMINAL DISTENTION: 0
ANAL BLEEDING: 0
VOMITING: 0
COUGH: 0
SHORTNESS OF BREATH: 0
CHOKING: 0
RECTAL PAIN: 0
TROUBLE SWALLOWING: 1
NAUSEA: 0
ABDOMINAL PAIN: 0
DIARRHEA: 0

## 2023-10-06 ENCOUNTER — TELEPHONE (OUTPATIENT)
Dept: GASTROENTEROLOGY | Age: 68
End: 2023-10-06

## 2023-11-02 ENCOUNTER — TELEPHONE (OUTPATIENT)
Dept: GASTROENTEROLOGY | Age: 68
End: 2023-11-02

## 2023-11-02 NOTE — TELEPHONE ENCOUNTER
Patient saw Marylou Cortez on 9/22/23 who ordered Kelsey Rosario EGD/CLN (Hamzah Rajan)- ANEMIA, DYSPHAGIA- FABBY-  ONLY- TS and was scheduled on 11/13/23. Patient was residing at [facility] at the time and Joshua scheduled through them. She also sent a plavix clearance to them along with prep instructions. We did not rcv the clearance so I called [facility] - they let me know that the patient was being discharged to home on 11/3/23 and they would not be giving the clearance. I called the patient to see if he was aware of the EGD/CLN, patient was unaware and wanted to cancel until he gets back on his feet, I made him aware of what the procedure was for, but he wanted to proceed with canceling.   He will call back to R/S at that time we also need to find out who manages his Plavix and send new clearance

## 2023-12-26 NOTE — TELEPHONE ENCOUNTER
Detail Level: Zone Vesta from [facility] called back again this morning to schedule procedures for patient. Please give her a call back @ [phone].     Thank you Detail Level: Detailed

## 2024-08-04 ENCOUNTER — HOSPITAL ENCOUNTER (EMERGENCY)
Age: 69
Discharge: HOME | End: 2024-08-05
Attending: EMERGENCY MEDICINE
Payer: MEDICARE

## 2024-08-04 ENCOUNTER — APPOINTMENT (OUTPATIENT)
Dept: CT IMAGING | Age: 69
End: 2024-08-04
Payer: MEDICARE

## 2024-08-04 DIAGNOSIS — F10.920 ACUTE ALCOHOLIC INTOXICATION WITHOUT COMPLICATION (HCC): Primary | ICD-10-CM

## 2024-08-04 LAB
ALBUMIN SERPL-MCNC: 3.8 G/DL (ref 3.5–5.2)
ALP SERPL-CCNC: 83 U/L (ref 40–130)
ALT SERPL-CCNC: 27 U/L (ref 5–41)
AMPHET UR QL SCN: NEGATIVE
ANION GAP SERPL CALCULATED.3IONS-SCNC: 17 MMOL/L (ref 7–19)
AST SERPL-CCNC: 36 U/L (ref 5–40)
BARBITURATES UR QL SCN: NEGATIVE
BASOPHILS # BLD: 0.1 K/UL (ref 0–0.2)
BASOPHILS NFR BLD: 2.1 % (ref 0–1)
BENZODIAZ UR QL SCN: NEGATIVE
BILIRUB SERPL-MCNC: 0.2 MG/DL (ref 0.2–1.2)
BILIRUB UR QL STRIP: NEGATIVE
BUN SERPL-MCNC: 22 MG/DL (ref 8–23)
BUPRENORPHINE URINE: NEGATIVE
CALCIUM SERPL-MCNC: 8.8 MG/DL (ref 8.8–10.2)
CANNABINOIDS UR QL SCN: NEGATIVE
CHLORIDE SERPL-SCNC: 101 MMOL/L (ref 98–111)
CLARITY UR: CLEAR
CO2 SERPL-SCNC: 19 MMOL/L (ref 22–29)
COCAINE UR QL SCN: NEGATIVE
COLOR UR: YELLOW
CREAT SERPL-MCNC: 1.4 MG/DL (ref 0.5–1.2)
DRUG SCREEN COMMENT UR-IMP: NORMAL
EKG P AXIS: 65 DEGREES
EKG P-R INTERVAL: 194 MS
EKG Q-T INTERVAL: 362 MS
EKG QRS DURATION: 88 MS
EKG QTC CALCULATION (BAZETT): 400 MS
EKG T AXIS: 66 DEGREES
EOSINOPHIL # BLD: 0.4 K/UL (ref 0–0.6)
EOSINOPHIL NFR BLD: 8.3 % (ref 0–5)
ERYTHROCYTE [DISTWIDTH] IN BLOOD BY AUTOMATED COUNT: 13.9 % (ref 11.5–14.5)
ETHANOLAMINE SERPL-MCNC: 292 MG/DL (ref 0–0.08)
FENTANYL SCREEN, URINE: NEGATIVE
GLUCOSE BLD-MCNC: 114 MG/DL (ref 70–99)
GLUCOSE SERPL-MCNC: 118 MG/DL (ref 74–109)
GLUCOSE UR STRIP.AUTO-MCNC: NEGATIVE MG/DL
HCT VFR BLD AUTO: 43 % (ref 42–52)
HGB BLD-MCNC: 13.8 G/DL (ref 14–18)
HGB UR STRIP.AUTO-MCNC: NEGATIVE MG/L
IMM GRANULOCYTES # BLD: 0.1 K/UL
KETONES UR STRIP.AUTO-MCNC: NEGATIVE MG/DL
LEUKOCYTE ESTERASE UR QL STRIP.AUTO: NEGATIVE
LYMPHOCYTES # BLD: 1.7 K/UL (ref 1.1–4.5)
LYMPHOCYTES NFR BLD: 32.6 % (ref 20–40)
MCH RBC QN AUTO: 35.1 PG (ref 27–31)
MCHC RBC AUTO-ENTMCNC: 32.1 G/DL (ref 33–37)
MCV RBC AUTO: 109.4 FL (ref 80–94)
METHADONE UR QL SCN: NEGATIVE
METHAMPHETAMINE, URINE: NEGATIVE
MONOCYTES # BLD: 0.4 K/UL (ref 0–0.9)
MONOCYTES NFR BLD: 6.8 % (ref 0–10)
NEUTROPHILS # BLD: 2.6 K/UL (ref 1.5–7.5)
NEUTS SEG NFR BLD: 48.5 % (ref 50–65)
NITRITE UR QL STRIP.AUTO: NEGATIVE
OPIATES UR QL SCN: NEGATIVE
OXYCODONE UR QL SCN: NEGATIVE
PCP UR QL SCN: NEGATIVE
PERFORMED ON: ABNORMAL
PH UR STRIP.AUTO: 5.5 [PH] (ref 5–8)
PLATELET # BLD AUTO: 239 K/UL (ref 130–400)
PMV BLD AUTO: 9.2 FL (ref 9.4–12.4)
POTASSIUM SERPL-SCNC: 4.3 MMOL/L (ref 3.5–5)
PROT SERPL-MCNC: 6.9 G/DL (ref 6.6–8.7)
PROT UR STRIP.AUTO-MCNC: NEGATIVE MG/DL
RBC # BLD AUTO: 3.93 M/UL (ref 4.7–6.1)
SODIUM SERPL-SCNC: 137 MMOL/L (ref 136–145)
SP GR UR STRIP.AUTO: 1.01 (ref 1–1.03)
TRICYCLIC ANTIDEPRESSANTS, UR: NEGATIVE
UROBILINOGEN UR STRIP.AUTO-MCNC: 0.2 E.U./DL
WBC # BLD AUTO: 5.3 K/UL (ref 4.8–10.8)

## 2024-08-04 PROCEDURE — 80053 COMPREHEN METABOLIC PANEL: CPT

## 2024-08-04 PROCEDURE — 85025 COMPLETE CBC W/AUTO DIFF WBC: CPT

## 2024-08-04 PROCEDURE — 93005 ELECTROCARDIOGRAM TRACING: CPT | Performed by: EMERGENCY MEDICINE

## 2024-08-04 PROCEDURE — 99284 EMERGENCY DEPT VISIT MOD MDM: CPT

## 2024-08-04 PROCEDURE — 81003 URINALYSIS AUTO W/O SCOPE: CPT

## 2024-08-04 PROCEDURE — 70450 CT HEAD/BRAIN W/O DYE: CPT

## 2024-08-04 PROCEDURE — 82077 ASSAY SPEC XCP UR&BREATH IA: CPT

## 2024-08-04 PROCEDURE — G0480 DRUG TEST DEF 1-7 CLASSES: HCPCS

## 2024-08-04 PROCEDURE — 82962 GLUCOSE BLOOD TEST: CPT

## 2024-08-04 PROCEDURE — 80307 DRUG TEST PRSMV CHEM ANLYZR: CPT

## 2024-08-04 PROCEDURE — 36415 COLL VENOUS BLD VENIPUNCTURE: CPT

## 2024-08-04 ASSESSMENT — LIFESTYLE VARIABLES
HOW OFTEN DO YOU HAVE A DRINK CONTAINING ALCOHOL: 4 OR MORE TIMES A WEEK
HOW MANY STANDARD DRINKS CONTAINING ALCOHOL DO YOU HAVE ON A TYPICAL DAY: 10 OR MORE

## 2024-08-04 NOTE — ED PROVIDER NOTES
[facility] EMERGENCY DEPT  eMERGENCY dEPARTMENT eNCOUnter      Pt Name: Topher Osei  MRN: [id number]  Birthdate 1955  Date of evaluation: 8/4/2024  Provider: Lewis Cook MD    CHIEF COMPLAINT       Chief Complaint   Patient presents with    Altered Mental Status     Patient states that he became very confused around 1045 today. He states \"I am not feeling very well\"         HISTORY OF PRESENT ILLNESS   (Location/Symptom, Timing/Onset,Context/Setting, Quality, Duration, Modifying Factors, Severity)  Note limiting factors.   Topher Osei is a 68 y.o. male who presents to the emergency department for evaluation regarding confusion.  Patient presents here to the ED via EMS.  He states he awoke this morning around 1045 and \"just did not feel right.\"  States he was concerned he was having a stroke although he was not experiencing any change in speech, visual changes or weakness of his upper or lower extremities.  Patient does state that he has been drinking some alcohol this morning.  He does have a prior history of chronic alcoholism.  He is not currently experiencing symptoms of an acute headache.  Denies thoughts of depression or thoughts about wanting to harm himself.    HPI    NursingNotes were reviewed.    REVIEW OF SYSTEMS    (2-9 systems for level 4, 10 or more for level 5)     Review of Systems   Constitutional:  Positive for fatigue. Negative for chills and fever.   Eyes:  Negative for visual disturbance.   Respiratory:  Negative for shortness of breath.    Cardiovascular:  Negative for chest pain and palpitations.   Gastrointestinal:  Negative for abdominal pain, nausea and vomiting.   Neurological:  Positive for weakness. Negative for facial asymmetry.   All other systems reviewed and are negative.           PAST MEDICALHISTORY     Past Medical History:   Diagnosis Date    Alcohol abuse     Lonliness    Cerebellar stroke, acute (HCC) 02/12/2016    Left    COPD (chronic obstructive pulmonary disease) ([facility])

## 2024-08-05 VITALS
OXYGEN SATURATION: 96 % | TEMPERATURE: 98.3 F | HEART RATE: 96 BPM | WEIGHT: 175 LBS | RESPIRATION RATE: 18 BRPM | BODY MASS INDEX: 25.84 KG/M2 | DIASTOLIC BLOOD PRESSURE: 72 MMHG | SYSTOLIC BLOOD PRESSURE: 146 MMHG

## 2024-08-05 NOTE — ED NOTES
Pt gave us his sisters phone number to call for a ride home. Pt sister did not answer got ahold of pt son who is unable to pick pt up and he said he would try to get ahold of her